# Patient Record
Sex: MALE | Race: WHITE | NOT HISPANIC OR LATINO | Employment: OTHER | URBAN - METROPOLITAN AREA
[De-identification: names, ages, dates, MRNs, and addresses within clinical notes are randomized per-mention and may not be internally consistent; named-entity substitution may affect disease eponyms.]

---

## 2017-02-02 ENCOUNTER — ALLSCRIPTS OFFICE VISIT (OUTPATIENT)
Dept: OTHER | Facility: OTHER | Age: 57
End: 2017-02-02

## 2017-02-02 DIAGNOSIS — E78.5 HYPERLIPIDEMIA: ICD-10-CM

## 2017-02-02 DIAGNOSIS — Z12.5 ENCOUNTER FOR SCREENING FOR MALIGNANT NEOPLASM OF PROSTATE: ICD-10-CM

## 2017-02-02 DIAGNOSIS — I25.10 ATHEROSCLEROTIC HEART DISEASE OF NATIVE CORONARY ARTERY WITHOUT ANGINA PECTORIS: ICD-10-CM

## 2017-02-25 ENCOUNTER — APPOINTMENT (OUTPATIENT)
Dept: LAB | Facility: CLINIC | Age: 57
End: 2017-02-25
Payer: COMMERCIAL

## 2017-02-25 DIAGNOSIS — E78.5 HYPERLIPIDEMIA: ICD-10-CM

## 2017-02-25 DIAGNOSIS — I25.10 ATHEROSCLEROTIC HEART DISEASE OF NATIVE CORONARY ARTERY WITHOUT ANGINA PECTORIS: ICD-10-CM

## 2017-02-25 DIAGNOSIS — Z12.5 ENCOUNTER FOR SCREENING FOR MALIGNANT NEOPLASM OF PROSTATE: ICD-10-CM

## 2017-02-25 LAB
ALBUMIN SERPL BCP-MCNC: 3.7 G/DL (ref 3.5–5)
ALP SERPL-CCNC: 54 U/L (ref 46–116)
ALT SERPL W P-5'-P-CCNC: 40 U/L (ref 12–78)
ANION GAP SERPL CALCULATED.3IONS-SCNC: 8 MMOL/L (ref 4–13)
AST SERPL W P-5'-P-CCNC: 19 U/L (ref 5–45)
BASOPHILS # BLD AUTO: 0.04 THOUSANDS/ΜL (ref 0–0.1)
BASOPHILS NFR BLD AUTO: 1 % (ref 0–1)
BILIRUB SERPL-MCNC: 1.44 MG/DL (ref 0.2–1)
BUN SERPL-MCNC: 19 MG/DL (ref 5–25)
CALCIUM SERPL-MCNC: 8.6 MG/DL (ref 8.3–10.1)
CHLORIDE SERPL-SCNC: 110 MMOL/L (ref 100–108)
CHOLEST SERPL-MCNC: 144 MG/DL (ref 50–200)
CO2 SERPL-SCNC: 25 MMOL/L (ref 21–32)
CREAT SERPL-MCNC: 0.92 MG/DL (ref 0.6–1.3)
EOSINOPHIL # BLD AUTO: 0.37 THOUSAND/ΜL (ref 0–0.61)
EOSINOPHIL NFR BLD AUTO: 6 % (ref 0–6)
ERYTHROCYTE [DISTWIDTH] IN BLOOD BY AUTOMATED COUNT: 13.9 % (ref 11.6–15.1)
GFR SERPL CREATININE-BSD FRML MDRD: >60 ML/MIN/1.73SQ M
GLUCOSE SERPL-MCNC: 89 MG/DL (ref 65–140)
HCT VFR BLD AUTO: 45.8 % (ref 36.5–49.3)
HDLC SERPL-MCNC: 65 MG/DL (ref 40–60)
HGB BLD-MCNC: 15.5 G/DL (ref 12–17)
LDLC SERPL CALC-MCNC: 62 MG/DL (ref 0–100)
LYMPHOCYTES # BLD AUTO: 1.72 THOUSANDS/ΜL (ref 0.6–4.47)
LYMPHOCYTES NFR BLD AUTO: 25 % (ref 14–44)
MCH RBC QN AUTO: 30.5 PG (ref 26.8–34.3)
MCHC RBC AUTO-ENTMCNC: 33.8 G/DL (ref 31.4–37.4)
MCV RBC AUTO: 90 FL (ref 82–98)
MONOCYTES # BLD AUTO: 0.69 THOUSAND/ΜL (ref 0.17–1.22)
MONOCYTES NFR BLD AUTO: 10 % (ref 4–12)
NEUTROPHILS # BLD AUTO: 3.93 THOUSANDS/ΜL (ref 1.85–7.62)
NEUTS SEG NFR BLD AUTO: 58 % (ref 43–75)
NRBC BLD AUTO-RTO: 0 /100 WBCS
PLATELET # BLD AUTO: 263 THOUSANDS/UL (ref 149–390)
PMV BLD AUTO: 10.7 FL (ref 8.9–12.7)
POTASSIUM SERPL-SCNC: 3.8 MMOL/L (ref 3.5–5.3)
PROT SERPL-MCNC: 7 G/DL (ref 6.4–8.2)
PSA SERPL-MCNC: 0.5 NG/ML (ref 0–4)
RBC # BLD AUTO: 5.09 MILLION/UL (ref 3.88–5.62)
SODIUM SERPL-SCNC: 143 MMOL/L (ref 136–145)
TRIGL SERPL-MCNC: 83 MG/DL
TSH SERPL DL<=0.05 MIU/L-ACNC: 3.6 UIU/ML (ref 0.36–3.74)
WBC # BLD AUTO: 6.76 THOUSAND/UL (ref 4.31–10.16)

## 2017-02-25 PROCEDURE — G0103 PSA SCREENING: HCPCS

## 2017-02-25 PROCEDURE — 80061 LIPID PANEL: CPT

## 2017-02-25 PROCEDURE — 84443 ASSAY THYROID STIM HORMONE: CPT

## 2017-02-25 PROCEDURE — 85025 COMPLETE CBC W/AUTO DIFF WBC: CPT

## 2017-02-25 PROCEDURE — 80053 COMPREHEN METABOLIC PANEL: CPT

## 2017-02-25 PROCEDURE — 36415 COLL VENOUS BLD VENIPUNCTURE: CPT

## 2017-07-31 ENCOUNTER — ALLSCRIPTS OFFICE VISIT (OUTPATIENT)
Dept: OTHER | Facility: OTHER | Age: 57
End: 2017-07-31

## 2017-08-14 ENCOUNTER — ALLSCRIPTS OFFICE VISIT (OUTPATIENT)
Dept: OTHER | Facility: OTHER | Age: 57
End: 2017-08-14

## 2017-08-14 ENCOUNTER — TRANSCRIBE ORDERS (OUTPATIENT)
Dept: LAB | Facility: CLINIC | Age: 57
End: 2017-08-14

## 2017-08-14 ENCOUNTER — APPOINTMENT (OUTPATIENT)
Dept: LAB | Facility: CLINIC | Age: 57
End: 2017-08-14
Payer: COMMERCIAL

## 2017-08-14 DIAGNOSIS — R53.83 OTHER FATIGUE: ICD-10-CM

## 2017-08-14 DIAGNOSIS — I10 ESSENTIAL (PRIMARY) HYPERTENSION: ICD-10-CM

## 2017-08-14 LAB
ANION GAP SERPL CALCULATED.3IONS-SCNC: 7 MMOL/L (ref 4–13)
BUN SERPL-MCNC: 11 MG/DL (ref 5–25)
CALCIUM SERPL-MCNC: 9.3 MG/DL (ref 8.3–10.1)
CHLORIDE SERPL-SCNC: 109 MMOL/L (ref 100–108)
CO2 SERPL-SCNC: 25 MMOL/L (ref 21–32)
CREAT SERPL-MCNC: 0.9 MG/DL (ref 0.6–1.3)
GFR SERPL CREATININE-BSD FRML MDRD: 96 ML/MIN/1.73SQ M
GLUCOSE SERPL-MCNC: 82 MG/DL (ref 65–140)
POTASSIUM SERPL-SCNC: 3.7 MMOL/L (ref 3.5–5.3)
SODIUM SERPL-SCNC: 141 MMOL/L (ref 136–145)

## 2017-08-14 PROCEDURE — 86618 LYME DISEASE ANTIBODY: CPT

## 2017-08-14 PROCEDURE — 80048 BASIC METABOLIC PNL TOTAL CA: CPT

## 2017-08-14 PROCEDURE — 36415 COLL VENOUS BLD VENIPUNCTURE: CPT

## 2017-08-16 LAB
B BURGDOR IGG SER IA-ACNC: 0.17
B BURGDOR IGM SER IA-ACNC: 0.31

## 2017-10-11 ENCOUNTER — HOSPITAL ENCOUNTER (EMERGENCY)
Facility: HOSPITAL | Age: 57
Discharge: HOME | End: 2017-10-11
Attending: EMERGENCY MEDICINE | Admitting: EMERGENCY MEDICINE
Payer: COMMERCIAL

## 2017-10-11 VITALS
OXYGEN SATURATION: 98 % | HEIGHT: 66 IN | BODY MASS INDEX: 32.14 KG/M2 | HEART RATE: 60 BPM | TEMPERATURE: 97.6 F | RESPIRATION RATE: 16 BRPM | WEIGHT: 200 LBS | DIASTOLIC BLOOD PRESSURE: 87 MMHG | SYSTOLIC BLOOD PRESSURE: 140 MMHG

## 2017-10-11 DIAGNOSIS — K64.5 THROMBOSED EXTERNAL HEMORRHOID: Primary | ICD-10-CM

## 2017-10-11 PROCEDURE — 99282 EMERGENCY DEPT VISIT SF MDM: CPT

## 2017-10-11 RX ORDER — NAPROXEN 500 MG/1
500 TABLET ORAL 2 TIMES DAILY WITH MEALS
Qty: 20 TABLET | Refills: 0 | Status: SHIPPED | OUTPATIENT
Start: 2017-10-11 | End: 2018-02-17

## 2017-10-11 RX ORDER — NAPROXEN 250 MG/1
500 TABLET ORAL ONCE
Status: COMPLETED | OUTPATIENT
Start: 2017-10-11 | End: 2017-10-11

## 2017-10-11 RX ORDER — ASCORBIC ACID 1000 MG
TABLET ORAL DAILY
COMMUNITY

## 2017-10-11 RX ORDER — NEBIVOLOL 10 MG/1
TABLET ORAL
COMMUNITY
Start: 2017-07-24 | End: 2018-02-17

## 2017-10-11 RX ORDER — ROSUVASTATIN CALCIUM 20 MG/1
TABLET, COATED ORAL
COMMUNITY
Start: 2014-09-18 | End: 2018-02-17

## 2017-10-11 RX ORDER — HYDROCODONE BITARTRATE AND ACETAMINOPHEN 5; 325 MG/1; MG/1
1 TABLET ORAL EVERY 6 HOURS PRN
Qty: 10 TABLET | Refills: 0 | Status: SHIPPED | OUTPATIENT
Start: 2017-10-11 | End: 2017-10-14

## 2017-10-11 RX ORDER — RANITIDINE 150 MG/1
TABLET ORAL
COMMUNITY
Start: 2014-12-09 | End: 2018-02-17

## 2017-10-11 RX ADMIN — NAPROXEN 500 MG: 250 TABLET ORAL at 16:29

## 2017-10-11 NOTE — DISCHARGE INSTRUCTIONS
Please continue Sitz baths symptom control, use the medications as prescribed return if he developed worsening or other concerning symptoms as instructed otherwise please follow up as discussed       Hemorrhoids   WHAT YOU NEED TO KNOW:   Hemorrhoids are swollen blood vessels inside your rectum (internal hemorrhoids) or on your anus (external hemorrhoids)  Sometimes a hemorrhoid may prolapse  This means it extends out of your anus  DISCHARGE INSTRUCTIONS:   Return to the emergency department if:   · You have severe pain in your rectum or around your anus  · You have severe pain in your abdomen and you are vomiting  · You have bleeding from your anus that soaks through your underwear  Contact your healthcare provider if:   · You have frequent and painful bowel movements  · Your hemorrhoid looks or feels more swollen than usual      · You do not have a bowel movement for 2 days or more  · You see or feel tissue coming through your anus  · You have questions or concerns about your condition or care  Medicines: You may  need any of the following:  · A pad, cream, or ointment  can help decrease pain, swelling, and itching  · Stool softeners  help treat or prevent constipation  · NSAIDs , such as ibuprofen, help decrease swelling, pain, and fever  NSAIDs can cause stomach bleeding or kidney problems in certain people  If you take blood thinner medicine, always ask your healthcare provider if NSAIDs are safe for you  Always read the medicine label and follow directions  · Take your medicine as directed  Contact your healthcare provider if you think your medicine is not helping or if you have side effects  Tell him or her if you are allergic to any medicine  Keep a list of the medicines, vitamins, and herbs you take  Include the amounts, and when and why you take them  Bring the list or the pill bottles to follow-up visits   Carry your medicine list with you in case of an emergency  Manage your symptoms:   · Apply ice on your anus for 15 to 20 minutes every hour or as directed  Use an ice pack, or put crushed ice in a plastic bag  Cover it with a towel before you apply it to your anus  Ice helps prevent tissue damage and decreases swelling and pain  · Take a sitz bath  Fill a bathtub with 4 to 6 inches of warm water  You may also use a sitz bath pan that fits inside a toilet bowl  Sit in the sitz bath for 15 minutes  Do this 3 times a day, and after each bowel movement  The warm water can help decrease pain and swelling  · Keep your anal area clean  Gently wash the area with warm water daily  Soap may irritate the area  After a bowel movement, wipe with moist towelettes or wet toilet paper  Dry toilet paper can irritate the area  Prevent hemorrhoids:   · Do not strain to have a bowel movement  Do not sit on the toilet too long  These actions can increase pressure on the tissues in your rectum and anus  · Drink plenty of liquids  Liquids can help prevent constipation  Ask how much liquid to drink each day and which liquids are best for you  · Eat a variety of high-fiber foods  Examples include fruits, vegetables, and whole grains  Ask your healthcare provider how much fiber you need each day  You may need to take a fiber supplement  · Exercise as directed  Exercise, such as walking, may make it easier to have a bowel movement  Ask your healthcare provider to help you create an exercise plan  · Do not have anal sex  Anal sex can weaken the skin around your rectum and anus  · Avoid heavy lifting  This can cause straining and increase your risk for another hemorrhoid  Follow up with your healthcare provider as directed:  Write down your questions so you remember to ask them during your visits     © 2017 [address] [employer] Information is for End User's use only and may not be sold, redistributed or otherwise used for commercial purposes  All illustrations and images included in CareNotes® are the copyrighted property of [employer]  [employer]  or Jaciel Santos  The above information is an  only  It is not intended as medical advice for individual conditions or treatments  Talk to your doctor, nurse or pharmacist before following any medical regimen to see if it is safe and effective for you  Thrombosed Hemorrhoid   WHAT YOU NEED TO KNOW:   A thrombosed hemorrhoid happens when blood clots become trapped inside your hemorrhoid  It is a common complication of hemorrhoids  Your hemorrhoid may suddenly look swollen or blue and feel very painful  DISCHARGE INSTRUCTIONS:   Return to the emergency department if:   · Your pain does not get better after you take medicine for pain  · You have heavy bleeding from your anus that fills 1 or more sanitary pads in 1 hour  Contact your healthcare provider if:   · You have a fever  · You have pus or a foul-smelling odor coming from your incision  · You have questions or concerns about your condition or care  Medicines: You may  need any of the following:  · Medicine  may be given to decrease pain, swelling, and itching  The medicine may come as a pad, cream, or ointment  · Acetaminophen  decreases pain and fever  It is available without a doctor's order  Ask how much to take and how often to take it  Follow directions  Acetaminophen can cause liver damage if not taken correctly  · Stool softeners  help treat or prevent constipation  · NSAIDs , such as ibuprofen, help decrease swelling, pain, and fever  NSAIDs can cause stomach bleeding or kidney problems in certain people  If you take blood thinner medicine, always ask your healthcare provider if NSAIDs are safe for you  Always read the medicine label and follow directions  · Take your medicine as directed  Contact your healthcare provider if you think your medicine is not helping or if you have side effects  Tell him or her if you are allergic to any medicine  Keep a list of the medicines, vitamins, and herbs you take  Include the amounts, and when and why you take them  Bring the list or the pill bottles to follow-up visits  Carry your medicine list with you in case of an emergency  Care for your wound as directed:  Do the following if your healthcare provider has made an incision in your hemorrhoid:  · Remove your bandage in 6 hours or as directed  · Ask your healthcare provider if you need to replace your bandage  If he tells you to, pat dry the area after your sitz bath  Put on new, clean bandages as directed  Change your bandages when they get wet or dirty  Wear a sanitary pad to absorb bleeding  Keep your anal area clean:  Gently wash the area with warm water daily  Soap may irritate the area  After a bowel movement, wipe with moist towelettes or wet toilet paper  Dry toilet paper can irritate the area  Take a sitz bath as directed:  A sitz bath can help decrease pain and swelling, and help keep the area clean  Take a sitz bath 3 times a day, and after each bowel movement  Fill a bathtub with 4 to 6 inches of warm water  You may also use a sitz bath pan that fits inside a toilet bowl  Sit in the sitz bath for 15 minutes  Apply ice on your anus for 15 to 20 minutes every hour or as directed:  Use an ice pack, or put crushed ice in a plastic bag  Cover it with a towel before you apply it to your anus  Ice helps prevent tissue damage and decreases swelling and pain  Prevent hemorrhoids:   · Do not strain to have a bowel movement  Do not sit on the toilet too long  These actions increase pressure in your rectum and anus  · Drink plenty of liquids  Liquids can help prevent constipation  Ask how much liquid to drink each day and which liquids are best for you  · Eat a variety of high-fiber foods  Examples include fruits, vegetables, and whole grains   Ask your healthcare provider how much fiber you need each day  You may need to take a fiber supplement  · Exercise as directed  Exercise, such as walking, may make it easier to have a bowel movement  Ask your healthcare provider to help you create an exercise plan  · Avoid heavy lifting  This can cause straining and increase your risk for another thrombosed hemorrhoid  Follow up with your healthcare provider as directed:  Write down your questions so you remember to ask them during your visits  © 2017 [employer] Information is for End User's use only and may not be sold, redistributed or otherwise used for commercial purposes  All illustrations and images included in CareNotes® are the copyrighted property of [employer]  or Jaciel Danielle  The above information is an  only  It is not intended as medical advice for individual conditions or treatments  Talk to your doctor, nurse or pharmacist before following any medical regimen to see if it is safe and effective for you  Sitz Bath   WHAT YOU NEED TO KNOW:   A sitz bath is when you soak in a warm or hot water tub to promote wound healing  It is often done after surgeries on the rectal or genital area  The heat from the water will clean the area, improve blood flow for healing, and decrease swelling and pain  DISCHARGE INSTRUCTIONS:   Gather your sitz bath supplies:   · A bathtub thermometer to check the water temperature    · Towels to cover your upper body during the bath and to dry off after    · A footstool to help you enter the bathtub if needed    · Bath mats to prevent slips in the tub and after you get out of the tub    · Clean clothes to wear after the bath  Fill the bathtub with water:  Fill the bathtub with water until it is at about the level of your belly button  Check the temperature of the water before you get in  For a warm water sitz bath, the water should be 94° to 98° Fahrenheit   A hot water sitz bath should be between 105° to 110° Fahrenheit  Stay in the bath for about 15 to 20 minutes  Portable sitz bath: You may be sent home with a portable sitz bath if you cannot get in and out of the bathtub  This is a small tub that will fit under your toilet seat  You will fill the tub with water as directed once it is under the toilet seat  Check the temperature of the water  You will also have a squirt bottle or water bag filled with the warm water  These are used to let the water flow out over the wound area during the sitz bath  This is also done for 15 to 20 minutes  Important tips when taking a sitz bath:   · You may have some discomfort when at first when you sit in the warm water  This should go away shortly after entering the tub  · Check the water temperature a few times during the bath  You may need to add more water to keep it at the right temperature  · Take a sitz bath when someone is close by to help you if needed  The heat from the water may cause you to feel weak or lightheaded  If this happens, call for help to get out of the tub  Do not stand up on your own in case you lose your balance  © 2017 [address] [employer] Information is for End User's use only and may not be sold, redistributed or otherwise used for commercial purposes  All illustrations and images included in CareNotes® are the copyrighted property of [employer]  or Jaciel Santos  The above information is an  only  It is not intended as medical advice for individual conditions or treatments  Talk to your doctor, nurse or pharmacist before following any medical regimen to see if it is safe and effective for you

## 2017-10-11 NOTE — ED PROVIDER NOTES
History  Chief Complaint   Patient presents with    Hemorrhoids     Pts PCP thinks he has a thrombic hemorrhoid      60-year-old male denies past medical history presenting with chief complaint of rectal pain  The patient states that he woke up this morning with a painful rectal mass the right side of his anus and constant all day and unchanged is worse when he sits is, better with rest he has not taken anything for this he denies fevers chills buttock pain discharge purulent discharge bleeding constitutional symptoms or other associated symptoms also denies history of similar symptoms he has had colonoscopies previously, family doctor was sent to the emergency department for evaluation of possible thrombosed hemorrhoid  Complete review systems otherwise negative as noted  Prior to Admission Medications   Prescriptions Last Dose Informant Patient Reported? Taking? Coenzyme Q10 (CO Q 10) 10 MG CAPS   Yes No   Sig: Take by mouth   aspirin 81 MG tablet   Yes No   Sig: Take by mouth   nebivolol (BYSTOLIC) 10 mg tablet   Yes Yes   Sig: Take by mouth   ranitidine (ZANTAC) 150 mg tablet   Yes Yes   Sig: Take by mouth   rosuvastatin (CRESTOR) 20 MG tablet   Yes Yes   Sig: Take by mouth      Facility-Administered Medications: None       Past Medical History:   Diagnosis Date    Hypertension        History reviewed  No pertinent surgical history  History reviewed  No pertinent family history  I have reviewed and agree with the history as documented  Social History   Substance Use Topics    Smoking status: Never Smoker    Smokeless tobacco: Never Used    Alcohol use No        Review of Systems   Constitutional: Negative for chills and fever  HENT: Negative for rhinorrhea and sore throat  Respiratory: Negative for cough and shortness of breath  Cardiovascular: Negative for chest pain and palpitations  Gastrointestinal: Positive for rectal pain   Negative for abdominal pain, anal bleeding, blood in stool, constipation, diarrhea, nausea and vomiting  Genitourinary: Negative for dysuria, frequency and urgency  Musculoskeletal: Negative for arthralgias, back pain and myalgias  Skin: Negative for color change and rash  Neurological: Negative for dizziness, weakness and numbness  Hematological: Negative for adenopathy  Does not bruise/bleed easily  Psychiatric/Behavioral: Negative for agitation and behavioral problems  All other systems reviewed and are negative  Physical Exam  ED Triage Vitals [10/11/17 1434]   Temperature Pulse Respirations Blood Pressure SpO2   97 6 °F (36 4 °C) 64 16 143/85 98 %      Temp Source Heart Rate Source Patient Position - Orthostatic VS BP Location FiO2 (%)   Oral Monitor Sitting Right arm --      Pain Score       4           Physical Exam   Constitutional: He is oriented to person, place, and time  He appears well-developed and well-nourished  No distress  Well-appearing in no acute distress   HENT:   Head: Normocephalic and atraumatic  Eyes: EOM are normal  Pupils are equal, round, and reactive to light  Neck: Normal range of motion  Neck supple  No tracheal deviation present  Cardiovascular: Normal rate, regular rhythm and normal heart sounds  Exam reveals no gallop and no friction rub  No murmur heard  Pulmonary/Chest: Effort normal and breath sounds normal  He has no wheezes  He has no rales  Abdominal: Soft  Bowel sounds are normal  He exhibits no distension  There is no tenderness  There is no rebound and no guarding     Patient clinically has a approximately 1-1 5 cm firm likely external thrombosed hemorrhoid at approximately 3 o'clock this is tender there is some very minimal fluctuance and does not appear completely thrombosed, there is no erythema there is no purulent drainage from the rectum, there is no surrounding induration erythema cellulitis perineal or buttock pain, clinically this is most consistent with early thrombosed hemorrhoid   Musculoskeletal: Normal range of motion  He exhibits no edema or tenderness  Neurological: He is alert and oriented to person, place, and time  No cranial nerve deficit  He exhibits normal muscle tone  Coordination normal    Skin: Skin is warm and dry  No rash noted  Psychiatric: He has a normal mood and affect  His behavior is normal    Nursing note and vitals reviewed  ED Medications  Medications   naproxen (NAPROSYN) tablet 500 mg (500 mg Oral Given 10/11/17 1629)       Diagnostic Studies  Labs Reviewed - No data to display    No orders to display       Procedures  Procedures      Phone Contacts  ED Phone Contact    ED Course  ED Course as of Oct 11 2219   Wed Oct 11, 2017   1625 Patient seen evaluated discharge after discussing at length risks benefits and alternatives to excising large thrombosed hemorrhoid, will treat conservatively, colorectal follow-up/GI follow-up as needed, close return instructions patient agreeable plan, patient of the emergency department self                                MDM  Number of Diagnoses or Management Options  Thrombosed external hemorrhoid:   Diagnosis management comments: 44-year-old male denies past medical history woke up today with painful rectal mass clinically has early thrombosed hemorrhoid though does not completely appear thrombosed, large, no other complaints, on exam again this is consistent with early thrombosed hemorrhoid this is not consistent with perirectal abscess, mass, etc, after lengthy discussion regarding risks benefits alternatives to excise large thrombosed hemorrhoid that may not completely be thrombosed, will offer symptom control, conservative treatment, follow up with Colorectal as needed close return follow-up instructions, patient and family agreeable plan    CritCare Time    Disposition  Final diagnoses:    Thrombosed external hemorrhoid     ED Disposition     ED Disposition Condition Comment    Discharge  Alonso Ramirez discharge to home/self care  Condition at discharge: Good        Follow-up Information     Follow up With Specialties Details Why Contact Info Additional Information    [facility]  If symptoms worsen [address] [facility] [place] [address] [phone] [place] [facility], [address] [place] [address], [id number] [facility], MD Colon and Rectal Surgery  As needed [address]  [phone]           Discharge Medication List as of 10/11/2017  4:29 PM      START taking these medications    Details   HYDROcodone-acetaminophen (NORCO) 5-325 mg per tablet Take 1 tablet by mouth every 6 (six) hours as needed for pain for up to 3 days Max Daily Amount: 4 tablets, Starting Wed 10/11/2017, Until Sat 10/14/2017, Print      hydrocortisone (ANUSOL-HC, PROCTOSOL HC,) 2 5 % rectal cream Insert into the rectum 2 (two) times a day, Starting Wed 10/11/2017, Print      naproxen (NAPROSYN) 500 mg tablet Take 1 tablet by mouth 2 (two) times a day with meals for 10 days, Starting Wed 10/11/2017, Until Sat 10/21/2017, Print         CONTINUE these medications which have NOT CHANGED    Details   nebivolol (BYSTOLIC) 10 mg tablet Take by mouth, Starting Mon 7/24/2017, Historical Med      ranitidine (ZANTAC) 150 mg tablet Take by mouth, Starting Tue 12/9/2014, Historical Med      rosuvastatin (CRESTOR) 20 MG tablet Take by mouth, Starting Thu 9/18/2014, Historical Med      aspirin 81 MG tablet Take by mouth, Historical Med      Coenzyme Q10 (CO Q 10) 10 MG CAPS Take by mouth, Historical Med           No discharge procedures on file      ED Provider  Electronically Signed by       Eric Correia DO  10/11/17 2851

## 2018-01-12 ENCOUNTER — ALLSCRIPTS OFFICE VISIT (OUTPATIENT)
Dept: OTHER | Facility: OTHER | Age: 58
End: 2018-01-12

## 2018-01-12 DIAGNOSIS — R07.89 OTHER CHEST PAIN: ICD-10-CM

## 2018-01-13 VITALS
HEIGHT: 67 IN | WEIGHT: 201.5 LBS | DIASTOLIC BLOOD PRESSURE: 82 MMHG | HEART RATE: 66 BPM | SYSTOLIC BLOOD PRESSURE: 128 MMHG | OXYGEN SATURATION: 96 % | BODY MASS INDEX: 31.63 KG/M2

## 2018-01-14 VITALS
DIASTOLIC BLOOD PRESSURE: 82 MMHG | HEART RATE: 68 BPM | BODY MASS INDEX: 33.27 KG/M2 | WEIGHT: 212 LBS | SYSTOLIC BLOOD PRESSURE: 118 MMHG | HEIGHT: 67 IN

## 2018-01-14 VITALS
DIASTOLIC BLOOD PRESSURE: 82 MMHG | HEART RATE: 64 BPM | SYSTOLIC BLOOD PRESSURE: 110 MMHG | HEIGHT: 67 IN | BODY MASS INDEX: 31.46 KG/M2 | WEIGHT: 200.44 LBS

## 2018-01-16 NOTE — PROGRESS NOTES
Assessment   Assessed    1  Chest discomfort (786 59) (R07 89)   2  Coronary arteriosclerosis (414 00) (I25 10)   3  Hypertension (401 9) (I10)   4  Hyperlipidemia (272 4) (E78 5)    Plan   Chest discomfort    · Nitroglycerin 0 4 MG Sublingual Tablet Sublingual; DISSOLVE 1 TABLET UNDER    THE TONGUE AS NEEDED FOR CHEST PAIN   Rx By: Pedro Alejandre; Dispense: 30 Days ; #:30 Tablet Sublingual; Refill: 5;For: Chest discomfort; TANVIR = N; Sent To: [facility]/PHARMACY #1975  · * NM MYOCARDIAL PERFUSION SPECT (STRESS AND/OR REST); Status:Need    Information - Financial Authorization,Retrospective Authorization; Requested    FBW:[id number]; Perform:[facility]; SPF:[id number]; Last Updated By:Misha Love; 1/12/2018 3:25:14 PM;Ordered; For:Chest discomfort; Ordered By:Andres Buitrago;   · ECHO COMPLETE WITH CONTRAST IF INDICATED; Status:Need Information - Financial    Authorization; Requested RICH:[id number]; Perform:[facility]; WMV:[id number];FUYBVRS; For:Chest discomfort; Ordered By:Misha Love;   · Follow-up visit in 3 months Evaluation and Treatment  Follow-up  Status: Hold For -    Scheduling  Requested for: [id number]   Ordered; For: Chest discomfort; Ordered By: Pedro Alejandre Performed:  Due: [id number]    Discussion/Summary   Cardiology Discussion Summary Free Text Note Form [facility]:    1  Chest pain: atypical  However in light of risk factors and strong family history, we will do an ECHO and stress test to r/o coronary ischemia  If chest pain persists or tests are abnormal, may proceed with cardiac catheterization  Will also start SL nitro as needed  Patient has known 50% stenosis of RCA  CAD: Continue ASA, rosuvastatin, bystolic  In light of chest discomfort, see above  Patient has known 50% stenosis of RCA  HTN: Controlled   HLD: On rosuvastatin   in 3 months  Goals and Barriers:  The patient has the current Goals: Medical management, ECHO, stress test  The patent has the current Barriers: None       Chief Complaint   Chief Complaint Free Text Note Form: f/u visit        Chief Complaint Chronic Condition [facility]: Patient is here today for follow up of chronic conditions described in HPI  History of Present Illness   Cardiology HPI Free Text Note Form [facility]: Patient with history of CAD, HTN, HLD presenting for follow up visit  Patient states that he sometimes gets a burning sensation in his chest, mild 2/10, L sided radiating to the center of his chest  This feels different from his GERD  He is unsure if this is exertional but he does notice it with exertion sometimes  He says the sensation only lasts a few minutes and then resolves on its own  Patient has an extensive family history of myocardial infarction and CAD  Patient has had a stress ECHO in 2016 which was equivocal for exercise induced ischemic ST segment depressions but negative for exercise induced ischemia based on normal augmented LV contractile  No recent ECHO  Patient denies SOB, lightheadedness, palpitations, leg swelling  Review of Systems   Cardiology Male ROS:         Cardiac: chest pain, but-- as noted in HPI  Skin: No complaints of nonhealing sores or skin rash  Genitourinary: No complaints of recurrent urinary tract infections, frequent urination at night, difficult urination, blood in urine, kidney stones, loss of bladder control, no kidney or prostate problems, no erectile dysfunction  Psychological: No complaints of feeling depressed, anxiety, panic attacks, or difficulty concentrating  General: No complaints of trouble sleeping, lack of energy, fatigue, appetite changes, weight changes, fever, frequent infections, or night sweats  Respiratory: No complaints of shortness of breath, cough with sputum, or wheezing  HEENT: No complaints of serious problems, hearing problems, nose problems, throat problems, or snoring        Gastrointestinal: No complaints of liver problems, nausea, vomiting, heartburn, constipation, bloody stools, diarrhea, problems swallowing, adbominal pain, or rectal bleeding  Hematologic: No complaints of bleeding disorders, anemia, blood clots, or excessive brusing  Neurological: No complaints of numbness, tingling, dizziness, weakness, seizures, headaches, syncope or fainting, AM fatigue, daytime sleepiness, no witnessed apnea episodes  Musculoskeletal: No complaints of arthritis, back pain, or painfull swelling  ROS Reviewed:    ROS reviewed  Active Problems   Problems    1  Coronary arteriosclerosis (414 00) (I25 10)   2  Esophageal reflux (530 81) (K21 9)   3  Fatigue (780 79) (R53 83)   4  Heart burn (787 1) (R12)   5  Hyperlipidemia (272 4) (E78 5)   6  Hypertension (401 9) (I10)   7  Obesity (278 00) (E66 9)   8  Prostate cancer screening (V76 44) (Z12 5)    Past Medical History   Problems    1  Denied: History of mental disorder   2  Denied: History of substance abuse   3  History of Intermittent diarrhea (787 91) (R19 7)   4  History of Tick bite (919 4,E906 4) [facility])  Active Problems And Past Medical History Reviewed: The active problems and past medical history were reviewed and updated today  Surgical History   Problems    1  History of Complete Colonoscopy  Surgical History Reviewed: The surgical history was reviewed and updated today  Family History   Mother    1  Denied: Family history of mental disorder   2  Denied: Family history of substance abuse  Father    3  Family history of Coronary Artery Disease (V17 49)   4  Denied: Family history of mental disorder   5  Denied: Family history of substance abuse   6  Family history of Myocardial Infarction Arrhythmias  Maternal Aunt    7  FH: GI cancer (V16 0) (Z80 0)  Family History Reviewed: The family history was reviewed and updated today         Social History   Problems    · Alcohol Use (History)   · Denied: History of Drug use   · Never a smoker  Social History Reviewed: The social history was reviewed and updated today  Current Meds    1  Aspirin 81 MG TABS; Take 1 tablet daily Recorded   2  Bystolic 10 MG Oral Tablet; Take 1 tablet daily; Therapy: [id number] to (Last Rx:[id number])  Requested for: 93Ehp4604 Ordered   3  Co Q 10 CAPS Recorded   4  Mens Multivitamin Plus TABS Recorded   5  RaNITidine HCl - 150 MG Oral Tablet; TAKE 1 TABLET DAILY; Therapy: 05IPE8711 to (Evaluate:08Jan2015); Last Rx:50Mgf0046 Ordered   6  Rosuvastatin Calcium 20 MG Oral Tablet; take 1 tablet by mouth every day; Therapy: 26SDE7391 to (Last 781 1704)  Requested for: 27Nov2017 Ordered  Medication List Reviewed: The medication list was reviewed and updated today  Allergies   Medication    1  No Known Drug Allergies    Vitals   Vital Signs    Recorded: 12Jan2018 02:40PM   Heart Rate 68   Systolic 578   Diastolic 78   Height 5 ft 6 5 in   Weight 208 lb 6 0 oz   BMI Calculated 33 13   BSA Calculated 2 05   O2 Saturation 96     Physical Exam        Constitutional      General appearance: No acute distress, well appearing and well nourished  Eyes      Conjunctiva and Sclera examination: Conjunctiva pink, sclera anicteric  Ears, Nose, Mouth, and Throat - Oropharynx: Clear, nares are clear, mucous membranes are moist       Neck      Neck and thyroid: Normal, supple, trachea midline, no thyromegaly  Pulmonary      Respiratory effort: No increased work of breathing or signs of respiratory distress  Auscultation of lungs: Clear to auscultation, no rales, no rhonchi, no wheezing, good air movement  Cardiovascular      Auscultation of heart: Normal rate and rhythm, normal S1 and S2, no murmurs  Carotid pulses: Normal, 2+ bilaterally  Peripheral vascular exam: Normal pulses throughout, no tenderness, erythema or swelling  Pedal pulses: Normal, 2+ bilaterally         Examination of extremities for edema and/or varicosities: Normal  Abdomen      Abdomen: Non-tender and no distention  Liver and spleen: No hepatomegaly or splenomegaly  Musculoskeletal Gait and station: Normal gait  -- Digits and nails: Normal without clubbing or cyanosis  -- Inspection/palpation of joints, bones, and muscles: Normal, ROM normal        Skin - Skin and subcutaneous tissue: Normal without rashes or lesions  Skin is warm and well perfused, normal turgor  Neurologic - Cranial nerves: II - XII intact  -- Speech: Normal        Psychiatric - Orientation to person, place, and time: Normal -- Mood and affect: Normal       Future Appointments      Date/Time Provider Specialty Site   02/19/2018 10:45 AM Bay Irving, DO Internal Medicine [facility]     Signatures    Electronically signed by : Darlene Bartholomew [facility]; Jan 12 2018  3:34PM EST                       (Author)     Electronically signed by : LETICIA Cavanaugh ; Nelson 15 2018 11:01PM EST                       (Author)

## 2018-01-22 VITALS
HEIGHT: 67 IN | BODY MASS INDEX: 32.71 KG/M2 | OXYGEN SATURATION: 96 % | HEART RATE: 68 BPM | SYSTOLIC BLOOD PRESSURE: 112 MMHG | WEIGHT: 208.38 LBS | DIASTOLIC BLOOD PRESSURE: 78 MMHG

## 2018-02-15 ENCOUNTER — HOSPITAL ENCOUNTER (OUTPATIENT)
Dept: NON INVASIVE DIAGNOSTICS | Facility: CLINIC | Age: 58
Discharge: HOME | End: 2018-02-15
Payer: COMMERCIAL

## 2018-02-15 DIAGNOSIS — R07.89 OTHER CHEST PAIN: ICD-10-CM

## 2018-02-15 PROCEDURE — 93017 CV STRESS TEST TRACING ONLY: CPT

## 2018-02-15 PROCEDURE — 93016 CV STRESS TEST SUPVJ ONLY: CPT | Performed by: INTERNAL MEDICINE

## 2018-02-15 PROCEDURE — A9502 TC99M TETROFOSMIN: HCPCS

## 2018-02-15 PROCEDURE — 78452 HT MUSCLE IMAGE SPECT MULT: CPT | Performed by: INTERNAL MEDICINE

## 2018-02-15 PROCEDURE — 93306 TTE W/DOPPLER COMPLETE: CPT | Performed by: INTERNAL MEDICINE

## 2018-02-15 PROCEDURE — 78452 HT MUSCLE IMAGE SPECT MULT: CPT

## 2018-02-15 PROCEDURE — 93306 TTE W/DOPPLER COMPLETE: CPT

## 2018-02-15 PROCEDURE — 93018 CV STRESS TEST I&R ONLY: CPT | Performed by: INTERNAL MEDICINE

## 2018-02-15 RX ORDER — AMINOPHYLLINE DIHYDRATE 25 MG/ML
50 INJECTION, SOLUTION INTRAVENOUS ONCE
Status: COMPLETED | OUTPATIENT
Start: 2018-02-15 | End: 2018-02-15

## 2018-02-15 RX ADMIN — AMINOPHYLLINE 50 MG: 25 INJECTION, SOLUTION INTRAVENOUS at 13:57

## 2018-02-15 RX ADMIN — REGADENOSON 0.4 MG: 0.08 INJECTION, SOLUTION INTRAVENOUS at 13:54

## 2018-02-16 ENCOUNTER — TELEPHONE (OUTPATIENT)
Dept: CARDIOLOGY CLINIC | Facility: CLINIC | Age: 58
End: 2018-02-16

## 2018-02-16 LAB
ARRHY DURING EX: NORMAL
CHEST PAIN STATEMENT: NORMAL
MAX DIASTOLIC BP: 76 MMHG
MAX HEART RATE: 129 BPM
MAX PREDICTED HEART RATE: 164 BPM
MAX. SYSTOLIC BP: 138 MMHG
PROTOCOL NAME: NORMAL
REASON FOR TERMINATION: NORMAL
TARGET HR FORMULA: NORMAL
TEST INDICATION: NORMAL
TIME IN EXERCISE PHASE: NORMAL

## 2018-02-19 ENCOUNTER — TELEPHONE (OUTPATIENT)
Dept: CARDIOLOGY CLINIC | Facility: CLINIC | Age: 58
End: 2018-02-19

## 2018-02-21 ENCOUNTER — TELEPHONE (OUTPATIENT)
Dept: INTERNAL MEDICINE CLINIC | Facility: CLINIC | Age: 58
End: 2018-02-21

## 2018-02-21 ENCOUNTER — OFFICE VISIT (OUTPATIENT)
Dept: INTERNAL MEDICINE CLINIC | Facility: CLINIC | Age: 58
End: 2018-02-21
Payer: COMMERCIAL

## 2018-02-21 VITALS
BODY MASS INDEX: 33.11 KG/M2 | OXYGEN SATURATION: 95 % | TEMPERATURE: 97.4 F | DIASTOLIC BLOOD PRESSURE: 82 MMHG | RESPIRATION RATE: 18 BRPM | WEIGHT: 206 LBS | SYSTOLIC BLOOD PRESSURE: 124 MMHG | HEART RATE: 76 BPM | HEIGHT: 66 IN

## 2018-02-21 DIAGNOSIS — Z71.3 DIETARY COUNSELING AND SURVEILLANCE: ICD-10-CM

## 2018-02-21 DIAGNOSIS — E66.9 OBESITY (BMI 30.0-34.9): Chronic | ICD-10-CM

## 2018-02-21 DIAGNOSIS — I25.10 CORONARY ARTERIOSCLEROSIS: Primary | Chronic | ICD-10-CM

## 2018-02-21 DIAGNOSIS — I10 ESSENTIAL HYPERTENSION: Chronic | ICD-10-CM

## 2018-02-21 DIAGNOSIS — E78.5 HYPERLIPIDEMIA LDL GOAL <100: Chronic | ICD-10-CM

## 2018-02-21 DIAGNOSIS — Z12.5 SCREENING FOR PROSTATE CANCER: ICD-10-CM

## 2018-02-21 PROCEDURE — 99214 OFFICE O/P EST MOD 30 MIN: CPT | Performed by: INTERNAL MEDICINE

## 2018-02-21 NOTE — PATIENT INSTRUCTIONS
Obesity   AMBULATORY CARE:   Obesity  is when your body mass index (BMI) is greater than 30  Your healthcare provider will use your height and weight to measure your BMI  The risks of obesity include  many health problems, such as injuries or physical disability  You may need tests to check for the following:  · Diabetes     · High blood pressure or high cholesterol     · Heart disease     · Gallbladder or liver disease     · Cancer of the colon, breast, prostate, liver, or kidney     · Sleep apnea     · Arthritis or gout  Seek care immediately if:   · You have a severe headache, confusion, or difficulty speaking  · You have weakness on one side of your body  · You have chest pain, sweating, or shortness of breath  Contact your healthcare provider if:   · You have symptoms of gallbladder or liver disease, such as pain in your upper abdomen  · You have knee or hip pain and discomfort while walking  · You have symptoms of diabetes, such as intense hunger and thirst, and frequent urination  · You have symptoms of sleep apnea, such as snoring or daytime sleepiness  · You have questions or concerns about your condition or care  Treatment for obesity  focuses on helping you lose weight to improve your health  Even a small decrease in BMI can reduce the risk for many health problems  Your healthcare provider will help you set a weight-loss goal   · Lifestyle changes  are the first step in treating obesity  These include making healthy food choices and getting regular physical activity  Your healthcare provider may suggest a weight-loss program that involves coaching, education, and therapy  · Medicine  may help you lose weight when it is used with a healthy diet and physical activity  · Surgery  can help you lose weight if you are very obese and have other health problems  There are several types of weight-loss surgery  Ask your healthcare provider for more information    Be successful losing weight:   · Set small, realistic goals  An example of a small goal is to walk for 20 minutes 5 days a week  Anther goal is to lose 5% of your body weight  · Tell friends, family members, and coworkers about your goals  and ask for their support  Ask a friend to lose weight with you, or join a weight-loss support group  · Identify foods or triggers that may cause you to overeat , and find ways to avoid them  Remove tempting high-calorie foods from your home and workplace  Place a bowl of fresh fruit on your kitchen counter  If stress causes you to eat, then find other ways to cope with stress  · Keep a diary to track what you eat and drink  Also write down how many minutes of physical activity you do each day  Weigh yourself once a week and record it in your diary  Eating changes: You will need to eat 500 to 1,000 fewer calories each day than you currently eat to lose 1 to 2 pounds a week  The following changes will help you cut calories:  · Eat smaller portions  Use small plates, no larger than 9 inches in diameter  Fill your plate half full of fruits and vegetables  Measure your food using measuring cups until you know what a serving size looks like  · Eat 3 meals and 1 or 2 snacks each day  Plan your meals in advance  Terryl Mcburney and eat at home most of the time  Eat slowly  · Eat fruits and vegetables at every meal   They are low in calories and high in fiber, which makes you feel full  Do not add butter, margarine, or cream sauce to vegetables  Use herbs to season steamed vegetables  · Eat less fat and fewer fried foods  Eat more baked or grilled chicken and fish  These protein sources are lower in calories and fat than red meat  Limit fast food  Dress your salads with olive oil and vinegar instead of bottled dressing  · Limit the amount of sugar you eat  Do not drink sugary beverages  Limit alcohol  Activity changes:  Physical activity is good for your body in many ways   It helps you burn calories and build strong muscles  It decreases stress and depression, and improves your mood  It can also help you sleep better  Talk to your healthcare provider before you begin an exercise program   · Exercise for at least 30 minutes 5 days a week  Start slowly  Set aside time each day for physical activity that you enjoy and that is convenient for you  It is best to do both weight training and an activity that increases your heart rate, such as walking, bicycling, or swimming  · Find ways to be more active  Do yard work and housecleaning  Walk up the stairs instead of using elevators  Spend your leisure time going to events that require walking, such as outdoor festivals or fairs  This extra physical activity can help you lose weight and keep it off  Follow up with your healthcare provider as directed: You may need to meet with a dietitian  Write down your questions so you remember to ask them during your visits  © 2017 [address] [employer] Information is for End User's use only and may not be sold, redistributed or otherwise used for commercial purposes  All illustrations and images included in CareNotes® are the copyrighted property of [employer] , [employer]  or Jaciel Santos  The above information is an  only  It is not intended as medical advice for individual conditions or treatments  Talk to your doctor, nurse or pharmacist before following any medical regimen to see if it is safe and effective for you  DASH Eating Plan   AMBULATORY CARE:   The DASH (Dietary Approaches to Stop Hypertension) Eating Plan  is designed to help prevent or lower high blood pressure  It can also help to lower LDL (bad) cholesterol and decrease your risk for heart disease  The plan is low in sodium, sugar, unhealthy fats, and total fat  It is high in potassium, calcium, magnesium, and fiber  These nutrients are added when you eat more fruits, vegetables, and whole grains     Your sodium limit each day: Your dietitian will tell you how much sodium is safe for you to have each day  People with high blood pressure should have no more than 1,500 to 2,300 mg of sodium in a day  A teaspoon (tsp) of salt has 2,300 mg of sodium  This may seem like a difficult goal, but small changes to the foods you eat can make a big difference  Your healthcare provider or dietitian can help you create a meal plan that follows your sodium limit  How to limit sodium:   · Read food labels  Food labels can help you choose foods that are low in sodium  The amount of sodium is listed in milligrams (mg)  The % Daily Value (DV) column tells you how much of your daily needs are met by 1 serving of the food for each nutrient listed  Choose foods that have less than 5% of the DV of sodium  These foods are considered low in sodium  Foods that have 20% or more of the DV of sodium are considered high in sodium  Avoid foods that have more than 300 mg of sodium in each serving  Choose foods that say low-sodium, reduced-sodium, or no salt added on the food label  · Avoid salt  Do not salt food at the table, and add very little salt to foods during cooking  Use herbs and spices, such as onions, garlic, and salt-free seasonings to add flavor to foods  Try lemon or lime juice or vinegar to give foods a tart flavor  Use hot peppers or a small amount of hot pepper sauce to add a spicy flavor to foods  · Ask about salt substitutes  Ask your healthcare provider if you may use salt substitutes  Some salt substitutes have ingredients that can be harmful if you have certain health conditions  · Choose foods carefully at restaurants  Meals from restaurants, especially fast food restaurants, are often high in sodium  Some restaurants have nutrition information that tells you the amount of sodium in their foods  Ask to have your food prepared with less, or no salt    What you need to know about fats:   · Include healthy fats   Examples are unsaturated fats and omega-3 fatty acids  Unsaturated fats are found in soybean, canola, olive, or sunflower oil, and liquid and soft tub margarines  Omega-3 fatty acids are found in fatty fish, such as salmon, tuna, mackerel, and sardines  It is also found in flaxseed oil and ground flaxseed  · Avoid unhealthy fats  Do not eat unhealthy fats, such as saturated fats and trans fats  Saturated fats are found in foods that contain fat from animals  Examples are fatty meats, whole milk, butter, cream, and other dairy foods  It is also found in shortening, stick margarine, palm oil, and coconut oil  Trans fats are found in fried foods, crackers, chips, and baked goods made with margarine or shortening  Foods to include: With the DASH eating plan, you need to eat a certain number of servings from each food group  This will help you get enough of certain nutrients and limit others  The amount of servings you should eat depends on how many calories you need  Your dietitian can tell you how many calories you need  The number of servings listed next to the food groups below are for people who need about 2,000 calories each day    · Grains:  6 to 8 servings (3 of these servings should be whole-grain foods)    ¨ 1 slice of whole-grain bread     ¨ 1 ounce of dry cereal    ¨ ½ cup of cooked cereal, pasta, or brown rice    · Vegetables and fruits:  4 to 5 servings of fruits and 4 to 5 servings of vegetables    ¨ 1 medium fruit    ¨ ½ cup of frozen, canned (no added salt), or chopped fresh vegetables     ¨ ½ cup of fresh, frozen, dried, or canned fruit (canned in light syrup or fruit juice)    ¨ ½ cup of vegetable or fruit juice    · Dairy:  2 to 3 servings    ¨ 1 cup of nonfat (skim) or 1% milk    ¨ 1½ ounces of fat-free or low-fat cheese    ¨ 6 ounces of nonfat or low-fat yogurt    · Lean meat, poultry, and fish:  6 ounces or less    [employer] (chicken, turkey) with no skin    ¨ Fish (especially fatty fish, such as salmon, fresh tuna, or mackerel)    ¨ Lean beef and pork (loin, round, extra lean hamburger)    ¨ Egg whites and egg substitutes    · Nuts, seeds, and legumes:  4 to 5 servings each week    ¨ ½ cup of cooked beans and peas    ¨ 1½ ounces of unsalted nuts    ¨ 2 tablespoons of peanut butter or seeds    · Sweets and added sugars:  5 or less each week    ¨ 1 tablespoon of sugar, jelly, or jam    ¨ ½ cup of sorbet or gelatin    ¨ 1 cup of lemonade    · Fats:  2 to 3 servings each week    ¨ 1 teaspoon of soft margarine or vegetable oil    ¨ 1 tablespoon of mayonnaise    ¨ 2 tablespoons of salad dressing  Foods to avoid:   · Grains:      [employer], such as doughnuts, pastries, cookies, and biscuits (high in fat and sugar)    ¨ Mixes for cornbread and biscuits, packaged foods, such as bread stuffing, rice and pasta mixes, macaroni and cheese, and instant cereals (high in sodium)    · Fruits and vegetables:      ¨ Regular, canned vegetables (high in sodium)    ¨ Sauerkraut, pickled vegetables, and other foods prepared in brine (high in sodium)    ¨ Fried vegetables or vegetables in butter or high-fat sauces    ¨ Fruit in cream or butter sauce (high in fat)    · Dairy:      ¨ Whole milk, 2% milk, and cream (high in fat)    ¨ Regular cheese and processed cheese (high in fat and sodium)    · Meats and protein foods:      ¨ Smoked or cured meat, such as corned beef, benavides, ham, hot dogs, and sausage (high in fat and sodium)    ¨ Canned beans and canned meats or spreads, such as potted meats, sardines, anchovies, and imitation seafood (high in sodium)    ¨ Deli or lunch meats, such as bologna, ham, turkey, and roast beef (high in sodium)    ¨ High-fat meat (T-bone steak, regular hamburger, and ribs)    ¨ Whole eggs and egg yolks (high in fat)    · Other:      ¨ Seasonings made with salt, such as garlic salt, celery salt, onion salt, seasoned salt, meat tenderizers, and monosodium glutamate (MSG)    ¨ Miso soup and canned or dried soup mixes (high in sodium)    ¨ Regular soy sauce, barbecue sauce, teriyaki sauce, steak sauce, Worcestershire sauce, and most flavored vinegars (high in sodium)    ¨ Regular condiments, such as mustard, ketchup, and salad dressings (high in sodium)    ¨ Gravy and sauces, such as [employer] or cheese sauces (high in sodium and fat)    ¨ Drinks high in sugar, such as soda or fruit drinks    [employer] foods, such as salted chips, popcorn, pretzels, pork rinds, salted crackers, and salted nuts    ¨ Frozen foods, such as dinners, entrees, vegetables with sauces, and breaded meats (high in sodium)  Other guidelines to follow:   · Maintain a healthy weight  Your risk for heart disease is higher if you are overweight  Your healthcare provider may suggest that you lose weight if you are overweight  You can lose weight by eating fewer calories and foods that have added sugars and fat  The DASH meal plan can help you do this  Decrease calories by eating smaller portions at each meal and fewer snacks  Ask your healthcare provider for more information about how to lose weight  · Exercise regularly  Regular exercise can help you reach or maintain a healthy weight  Regular exercise can also help decrease your blood pressure and improve your cholesterol levels  Get 30 minutes or more of moderate exercise each day of the week  To lose weight, get at least 60 minutes of exercise  Talk to your healthcare provider about the best exercise program for you  · Limit alcohol  Women should limit alcohol to 1 drink a day  Men should limit alcohol to 2 drinks a day  A drink of alcohol is 12 ounces of beer, 5 ounces of wine, or 1½ ounces of liquor  © 2017 [address] [facility] Information is for End User's use only and may not be sold, redistributed or otherwise used for commercial purposes   All illustrations and images included in CareNotes® are the copyrighted property of A D A M , Inc  or [employer]  The above information is an  only  It is not intended as medical advice for individual conditions or treatments  Talk to your doctor, nurse or pharmacist before following any medical regimen to see if it is safe and effective for you

## 2018-02-21 NOTE — PROGRESS NOTES
INTERNAL MEDICINE FOLLOW-UP OFFICE VISIT  [facility] - [facility]    NAME: Fadia Ya  AGE: 65 y o  SEX: male  : 1960       DATE: 2018    Assessment and Plan     1  Coronary arteriosclerosis    Known 50% stenosis of RCA  Patient has recent atypical chest discomfort  Stress test and ECHO were negative  There was grade 1 DD on ECHO  Small, fixed defect on nuclear stress test  Continue aspirin, crestor, bystolic, and nitro prn  From the description and knowing his history, I would think his intermittent chest discomfort is GI related  He takes ranitidine only as needed  Previous EGD in  showed chronic esophagitis  Discussed dietary changes he should make  If chest discomfort were to persist may still have to consider cardiac cath due to his risk factors, but if that was negative, should get GI eval with repeat EGD      - Lipid panel; Future  - Comprehensive metabolic panel; Future  - CBC; Future    2  Essential hypertension    BP well controlled  Continue bystolic  3  Hyperlipidemia LDL goal <100    Repeat lipid panel  Last LDL was below 100  Continue crestor  Low cholesterol diet  4  Obesity (BMI 30 0-34 9)    Obesity is unchanged  Discussed the patient's BMI  The BMI is above average; BMI management plan is completed  General weight loss/lifestyle modification strategies discussed (elicit support from others; identify saboteurs; non-food rewards, etc)  Diet interventions: low calorie, low cholesterol, low carb  BMI goal <25    5  Screening for prostate cancer    Check PSA  Due on 18  Last PSA normal on 17     - PSA;  Future    - Counseling Documentation: patient was counseled regarding: diagnostic results, instructions for management, risk factor reductions, prognosis, patient and family education, impressions, risks and benefits of treatment options and importance of compliance with treatment  - Barriers to treatment: none  - Medication Side Effects: Adverse side effects of medications were reviewed with the patient/guardian today  - Self Referrals: No     Return to office in: 6 months or earlier if needed    Chief Complaint     Chief Complaint   Patient presents with    Follow-up     History of Present Illness     Patient presents for routine follow-up  Had been experiencing atypical chest discomfort and saw Dr Malika Funk who ordered nuclear stress test and echo  Nuclear stress showed small, fixed defect but no acute ischemia  ECHO showed grade 1 DD and normal EF  He describes the pain as a burning discomfort  Previous EGD in 2014 showed chronic esophagitis  He takes ranitidine only if he is going to eat a fatty, greasy meal when he goes out to eat  He denies any shortness of breath, palpitations, burping, bloating, poor taste in mouth, abdominal pain, diarrhea, nausea, vomiting  Labs from 1 year ago were reviewed  States he got a flu shot at [facility] this year  States he had a colonoscopy back in 2010 he believes  The following portions of the patient's history were reviewed and updated as appropriate: allergies, current medications, past family history, past medical history, past social history, past surgical history and problem list     Review of Systems     Review of Systems   Constitutional: Negative for activity change, appetite change and fatigue  Respiratory: Negative for apnea, cough, chest tightness, shortness of breath and wheezing  Cardiovascular: Positive for chest pain (Burning)  Negative for palpitations and leg swelling  Gastrointestinal: Negative for abdominal distention, abdominal pain, blood in stool, constipation, diarrhea, nausea and vomiting  Musculoskeletal: Negative for arthralgias, back pain, gait problem, joint swelling and myalgias  Skin: Negative for rash and wound     Neurological: Negative for dizziness, tremors, seizures, syncope, facial asymmetry, speech difficulty, weakness, light-headedness, numbness and headaches  Psychiatric/Behavioral: Negative for behavioral problems, confusion, hallucinations, sleep disturbance and suicidal ideas  The patient is not nervous/anxious  Active Problem List     Patient Active Problem List   Diagnosis    Coronary arteriosclerosis    Gastroesophageal reflux disease without esophagitis    Hyperlipidemia LDL goal <100    Essential hypertension    Obesity (BMI 30 0-34 9)     Objective     /82   Pulse 76   Temp (!) 97 4 °F (36 3 °C)   Resp 18   Ht 5' 6" (1 676 m)   Wt 93 4 kg (206 lb)   SpO2 95%   BMI 33 25 kg/m²     Physical Exam  GENERAL: Appears well-developed and well-nourished  Appears in no acute distress  Obese abdomen  NECK: Neck supple with no lymphadenopathy  Trachea midline  No JVD  CARDIOVASCULAR: S1 and S2 are present  Regular rate and rhythm  No murmurs, rubs, or gallops  RESPIRATORY: CTA B/L, no rales, rhonci or wheezes  Normal respiratory expansion  ABDOMINAL: Bowel sounds present in all 4 quadrants, non-tender, soft, non-distended  No organomegaly, rebound, or guarding  EXTREMITIES: 2+ DP and PT pulses bilaterally; no cyanosis, clubbing, edema  ROM intact  VALDEZ x4   SKIN: Skin is warm and dry  No skin lesions are present  No rashes  PSYCHIATRIC: Normal mood and affect     Pertinent Laboratory/Diagnostic Studies:    Laboratory Results: I have personally reviewed the pertinent laboratory results/reports   Radiology/Other Diagnostic Testing Results: I have personally reviewed pertinent reports  Results for orders placed during the hospital encounter of 02/15/18   NM myocardial perfusion spect (rx stress and/or rest)    SUMMARY:  -  Stress results: There was no chest pain during stress  -  ECG conclusions: The stress ECG was negative for ischemia and normal   -  Perfusion imaging: There was a small, mildly severe, fixed myocardial perfusion defect of the apical wall likely due to low counts    -  Gated SPECT: The calculated left ventricular ejection fraction was 57 %  Left ventricular ejection fraction was within normal limits by visual estimate  There was no left ventricular regional abnormality  IMPRESSIONS: Normal study  There was image artifact, without diagnostic evidence for perfusion abnormality  Left ventricular systolic function was normal     Prepared and signed by    Nika Whittaker MD  Signed 02/15/2018 17:57:29       Results for orders placed during the hospital encounter of 02/15/18   Echo complete with contrast if indicated    SUMMARY    LEFT VENTRICLE:  Systolic function was normal  Ejection fraction was estimated in the range of 55 % to 65 %  There were no regional wall motion abnormalities  Doppler parameters were consistent with abnormal left ventricular relaxation (grade 1 diastolic dysfunction)  TRICUSPID VALVE:  There was trace regurgitation  HISTORY: PRIOR HISTORY: Medication-treated hyperlipidemia  CAD  Risk factors: a family history of coronary artery disease  Current Medications       Current Outpatient Prescriptions:     aspirin 81 MG tablet, Take by mouth, Disp: , Rfl:     Coenzyme Q10 (CO Q 10) 10 MG CAPS, Take by mouth, Disp: , Rfl:     nebivolol (BYSTOLIC) 10 mg tablet, Take 1 tablet by mouth daily, Disp: , Rfl:     Omega-3 Fatty Acids ([facility] FISH OIL) 1200 MG CAPS, [facility]S Fish Oil 1200 MG Oral Capsule; 0; 0; 06-Feb-2014;  Active, Disp: , Rfl:     ranitidine (ZANTAC) 150 mg tablet, Take 1 tablet by mouth daily, Disp: , Rfl:     rosuvastatin (CRESTOR) 20 MG tablet, Take 1 tablet by mouth daily, Disp: , Rfl:     nitroglycerin (NITROSTAT) 0 4 mg SL tablet, Place 0 4 mg under the tongue as needed for chest pain, Disp: , Rfl: 5    Health Maintenance     Health Maintenance   Topic Date Due    Hepatitis C Screening  1960    COLONOSCOPY  1960    DTaP,Tdap,and Td Vaccines (1 - Tdap) 08/29/1968    INFLUENZA VACCINE  09/01/2017    Depression Screening PHQ-9 08/14/2018    PNEUMOCOCCAL POLYSACCHARIDE VACCINE AGE 2-64 HIGH RISK  Completed    HIV SCREENING  Excluded     Immunization History   Administered Date(s) Administered    Influenza 12/16/2008    Influenza TIV (IM) 10/01/2017    Pneumococcal Polysaccharide PPV23 12/16/2008       Chela Nieto DO  [facility]

## 2018-03-08 DIAGNOSIS — E78.5 HYPERLIPIDEMIA, UNSPECIFIED HYPERLIPIDEMIA TYPE: Primary | ICD-10-CM

## 2018-03-08 RX ORDER — ROSUVASTATIN CALCIUM 20 MG/1
20 TABLET, COATED ORAL DAILY
Qty: 90 TABLET | Refills: 3 | Status: SHIPPED | OUTPATIENT
Start: 2018-03-08 | End: 2018-10-03 | Stop reason: SDUPTHER

## 2018-03-13 ENCOUNTER — TELEPHONE (OUTPATIENT)
Dept: INTERNAL MEDICINE CLINIC | Facility: CLINIC | Age: 58
End: 2018-03-13

## 2018-03-13 NOTE — TELEPHONE ENCOUNTER
PT REC'ED A CALL FROM [facility] THAT THEY HAVE HIS RX FOR CRESTOR    PT WILL GET THE MED FROM THERE, BUT SD FROM NOW ON IT NEEDS TO BE ORDERED THROUGH [facility]

## 2018-03-14 ENCOUNTER — APPOINTMENT (OUTPATIENT)
Dept: LAB | Facility: CLINIC | Age: 58
End: 2018-03-14
Payer: COMMERCIAL

## 2018-03-14 DIAGNOSIS — I25.10 CORONARY ARTERIOSCLEROSIS: Chronic | ICD-10-CM

## 2018-03-14 DIAGNOSIS — Z12.5 SCREENING FOR PROSTATE CANCER: ICD-10-CM

## 2018-03-14 LAB
ALBUMIN SERPL BCP-MCNC: 4.1 G/DL (ref 3.5–5)
ALP SERPL-CCNC: 53 U/L (ref 46–116)
ALT SERPL W P-5'-P-CCNC: 33 U/L (ref 12–78)
ANION GAP SERPL CALCULATED.3IONS-SCNC: 7 MMOL/L (ref 4–13)
AST SERPL W P-5'-P-CCNC: 32 U/L (ref 5–45)
BILIRUB SERPL-MCNC: 1.49 MG/DL (ref 0.2–1)
BUN SERPL-MCNC: 16 MG/DL (ref 5–25)
CALCIUM SERPL-MCNC: 8.8 MG/DL (ref 8.3–10.1)
CHLORIDE SERPL-SCNC: 107 MMOL/L (ref 100–108)
CHOLEST SERPL-MCNC: 123 MG/DL (ref 50–200)
CO2 SERPL-SCNC: 25 MMOL/L (ref 21–32)
CREAT SERPL-MCNC: 0.82 MG/DL (ref 0.6–1.3)
ERYTHROCYTE [DISTWIDTH] IN BLOOD BY AUTOMATED COUNT: 13.8 % (ref 11.6–15.1)
GFR SERPL CREATININE-BSD FRML MDRD: 99 ML/MIN/1.73SQ M
GLUCOSE SERPL-MCNC: 75 MG/DL (ref 65–140)
HCT VFR BLD AUTO: 43.4 % (ref 36.5–49.3)
HDLC SERPL-MCNC: 57 MG/DL (ref 40–60)
HGB BLD-MCNC: 14.7 G/DL (ref 12–17)
LDLC SERPL CALC-MCNC: 47 MG/DL (ref 0–100)
MCH RBC QN AUTO: 30.1 PG (ref 26.8–34.3)
MCHC RBC AUTO-ENTMCNC: 33.9 G/DL (ref 31.4–37.4)
MCV RBC AUTO: 89 FL (ref 82–98)
PLATELET # BLD AUTO: 310 THOUSANDS/UL (ref 149–390)
PMV BLD AUTO: 10.7 FL (ref 8.9–12.7)
POTASSIUM SERPL-SCNC: 3.8 MMOL/L (ref 3.5–5.3)
PROT SERPL-MCNC: 7.3 G/DL (ref 6.4–8.2)
RBC # BLD AUTO: 4.89 MILLION/UL (ref 3.88–5.62)
SODIUM SERPL-SCNC: 139 MMOL/L (ref 136–145)
TRIGL SERPL-MCNC: 97 MG/DL
WBC # BLD AUTO: 8.43 THOUSAND/UL (ref 4.31–10.16)

## 2018-03-14 PROCEDURE — 80061 LIPID PANEL: CPT

## 2018-03-14 PROCEDURE — 85027 COMPLETE CBC AUTOMATED: CPT

## 2018-03-14 PROCEDURE — 80053 COMPREHEN METABOLIC PANEL: CPT

## 2018-03-14 PROCEDURE — 36415 COLL VENOUS BLD VENIPUNCTURE: CPT

## 2018-03-14 PROCEDURE — G0103 PSA SCREENING: HCPCS

## 2018-03-15 ENCOUNTER — TELEPHONE (OUTPATIENT)
Dept: INTERNAL MEDICINE CLINIC | Facility: CLINIC | Age: 58
End: 2018-03-15

## 2018-03-15 LAB — PSA SERPL-MCNC: 0.4 NG/ML (ref 0–4)

## 2018-03-15 NOTE — TELEPHONE ENCOUNTER
----- Message from Stephanie Wolff MA sent at 3/15/2018 10:04 AM EDT -----  Called and spoke with patient  Patient given lab results  ----- Message -----  From: Araseli Campbell DO  Sent: 3/15/2018   7:54 AM  To: [facility]    Let patient know all his lab testing was normal  Total cholesterol excellent at 123

## 2018-04-11 ENCOUNTER — OFFICE VISIT (OUTPATIENT)
Dept: CARDIOLOGY CLINIC | Facility: CLINIC | Age: 58
End: 2018-04-11
Payer: COMMERCIAL

## 2018-04-11 ENCOUNTER — APPOINTMENT (OUTPATIENT)
Dept: LAB | Facility: CLINIC | Age: 58
End: 2018-04-11
Payer: COMMERCIAL

## 2018-04-11 VITALS
SYSTOLIC BLOOD PRESSURE: 110 MMHG | DIASTOLIC BLOOD PRESSURE: 84 MMHG | HEIGHT: 66 IN | OXYGEN SATURATION: 98 % | HEART RATE: 71 BPM | BODY MASS INDEX: 32.24 KG/M2 | WEIGHT: 200.6 LBS

## 2018-04-11 DIAGNOSIS — E78.5 HYPERLIPIDEMIA LDL GOAL <100: Chronic | ICD-10-CM

## 2018-04-11 DIAGNOSIS — I10 ESSENTIAL HYPERTENSION: Chronic | ICD-10-CM

## 2018-04-11 DIAGNOSIS — I25.10 CORONARY ARTERIOSCLEROSIS: Chronic | ICD-10-CM

## 2018-04-11 DIAGNOSIS — R07.9 CHEST PAIN, UNSPECIFIED TYPE: Primary | ICD-10-CM

## 2018-04-11 LAB
ANION GAP SERPL CALCULATED.3IONS-SCNC: 6 MMOL/L (ref 4–13)
BUN SERPL-MCNC: 17 MG/DL (ref 5–25)
CALCIUM SERPL-MCNC: 9.1 MG/DL
CHLORIDE SERPL-SCNC: 110 MMOL/L (ref 100–108)
CO2 SERPL-SCNC: 25 MMOL/L (ref 21–32)
CREAT SERPL-MCNC: 0.9 MG/DL (ref 0.6–1.3)
ERYTHROCYTE [DISTWIDTH] IN BLOOD BY AUTOMATED COUNT: 13.5 % (ref 11.6–15.1)
GFR SERPL CREATININE-BSD FRML MDRD: 95 ML/MIN/1.73SQ M
GLUCOSE SERPL-MCNC: 84 MG/DL (ref 65–140)
HCT VFR BLD AUTO: 47.1 % (ref 36.5–49.3)
HGB BLD-MCNC: 15.5 G/DL (ref 12–17)
MCH RBC QN AUTO: 30.3 PG (ref 26.8–34.3)
MCHC RBC AUTO-ENTMCNC: 32.9 G/DL (ref 31.4–37.4)
MCV RBC AUTO: 92 FL (ref 82–98)
PLATELET # BLD AUTO: 321 THOUSANDS/UL (ref 149–390)
PMV BLD AUTO: 10.7 FL (ref 8.9–12.7)
POTASSIUM SERPL-SCNC: 3.9 MMOL/L (ref 3.5–5.3)
RBC # BLD AUTO: 5.12 MILLION/UL (ref 3.88–5.62)
SODIUM SERPL-SCNC: 141 MMOL/L (ref 136–145)
WBC # BLD AUTO: 10.52 THOUSAND/UL (ref 4.31–10.16)

## 2018-04-11 PROCEDURE — 80048 BASIC METABOLIC PNL TOTAL CA: CPT | Performed by: INTERNAL MEDICINE

## 2018-04-11 PROCEDURE — 36415 COLL VENOUS BLD VENIPUNCTURE: CPT | Performed by: INTERNAL MEDICINE

## 2018-04-11 PROCEDURE — 99214 OFFICE O/P EST MOD 30 MIN: CPT | Performed by: INTERNAL MEDICINE

## 2018-04-11 PROCEDURE — 85027 COMPLETE CBC AUTOMATED: CPT | Performed by: INTERNAL MEDICINE

## 2018-04-11 PROCEDURE — 85610 PROTHROMBIN TIME: CPT | Performed by: INTERNAL MEDICINE

## 2018-04-11 RX ORDER — RANOLAZINE 500 MG/1
500 TABLET, EXTENDED RELEASE ORAL 2 TIMES DAILY
COMMUNITY
End: 2019-06-13 | Stop reason: CLARIF

## 2018-04-11 NOTE — PROGRESS NOTES
Cardiology Follow Up    Gabby Munoz  1960  [id number]  [address] [facility] [address]    1  Chest pain, unspecified type     2  Coronary arteriosclerosis     3  Essential hypertension     4  Hyperlipidemia LDL goal <100       Chief Complaint   Patient presents with    Follow-up     Interval History: Patient reporting ongoing chest pain despite negative stress  He also had echo that was unremarkable  He describes it as chest burning that is somewhat random-- with rest or with exertion  He admits that he has not been very active in the recent past  He denies SOB out of the ordinary  He denies palpitation, dizziness, edema, orthopnea  Patient Active Problem List   Diagnosis    Coronary arteriosclerosis    Gastroesophageal reflux disease without esophagitis    Hyperlipidemia LDL goal <100    Essential hypertension    Obesity (BMI 30 0-34  9)     Past Medical History:   Diagnosis Date    CAD (coronary artery disease) 2009    50% RCA stenosis on coronary angiogram     GERD (gastroesophageal reflux disease)     Hyperlipidemia     Hypertension     Obesity     Schatzki's ring 02/07/2014     Social History     Social History    Marital status: /Civil Union     Spouse name: N/A    Number of children: N/A    Years of education: N/A     Occupational History    Not on file       Social History Main Topics    Smoking status: Never Smoker    Smokeless tobacco: Never Used    Alcohol use No      Comment: OCCASIONALLY PER [employer]SCRIPTS     Drug use: No    Sexual activity: Not on file     Other Topics Concern    Not on file     Social History Narrative    No narrative on file      Family History   Problem Relation Age of Onset    Coronary artery disease Father     Heart attack Father 40    Arrhythmia Father     Cancer Maternal Aunt      GI     Past Surgical History: Procedure Laterality Date    COLONOSCOPY  2010       Current Outpatient Prescriptions:     aspirin 81 MG tablet, Take by mouth, Disp: , Rfl:     Coenzyme Q10 (CO Q 10) 10 MG CAPS, Take by mouth, Disp: , Rfl:     nebivolol (BYSTOLIC) 10 mg tablet, Take 1 tablet by mouth daily, Disp: , Rfl:     ranitidine (ZANTAC) 150 mg tablet, Take 1 tablet by mouth daily, Disp: , Rfl:     rosuvastatin (CRESTOR) 20 MG tablet, Take 1 tablet (20 mg total) by mouth daily, Disp: 90 tablet, Rfl: 3    nitroglycerin (NITROSTAT) 0 4 mg SL tablet, Place 0 4 mg under the tongue as needed for chest pain, Disp: , Rfl: 5    Omega-3 Fatty Acids ([facility] FISH OIL) 1200 MG CAPS, [facility] Fish Oil 1200 MG Oral Capsule; 0; 0; 06-Feb-2014; Active, Disp: , Rfl:   No Known Allergies      Review of Systems:  Review of Systems   Constitutional: Negative for activity change, diaphoresis, fatigue, fever and unexpected weight change  HENT: Negative for nosebleeds and trouble swallowing  Eyes: Negative for visual disturbance  Respiratory: Negative for cough, chest tightness, shortness of breath and wheezing  Cardiovascular: Positive for chest pain  Negative for palpitations and leg swelling  Gastrointestinal: Negative for abdominal pain, anal bleeding, blood in stool and nausea  Endocrine: Negative for cold intolerance and heat intolerance  Genitourinary: Negative for decreased urine volume, frequency and hematuria  Musculoskeletal: Negative for myalgias  Skin: Negative for color change and wound  Neurological: Negative for dizziness, syncope, weakness, light-headedness and headaches  Psychiatric/Behavioral: Negative for sleep disturbance  The patient is not nervous/anxious  Physical Exam:  /84   Pulse 71   Ht 5' 6" (1 676 m)   Wt 91 kg (200 lb 9 6 oz)   SpO2 98%   BMI 32 38 kg/m²     Physical Exam   Constitutional: He is oriented to person, place, and time  He appears well-developed and well-nourished   No distress  HENT:   Head: Normocephalic and atraumatic  Eyes: Conjunctivae are normal  No scleral icterus  Neck: Neck supple  No JVD present  Cardiovascular: Normal rate and regular rhythm  No murmur heard  Pulmonary/Chest: Effort normal and breath sounds normal  No respiratory distress  He has no wheezes  He has no rales  Abdominal: He exhibits no distension  There is no tenderness  Musculoskeletal: He exhibits no edema  Neurological: He is alert and oriented to person, place, and time  Skin: Skin is warm and dry  No rash noted  He is not diaphoretic  Psychiatric: He has a normal mood and affect  Discussion/Summary:    Chest pain/CAD  -Known history of CAD with 50% RCA lesion that was not fixed on prior cath in 2009   -stress test 02/2018 was negative for ischemia, likewise echo was unremarkable EF 33-77%, grade 1 diastolic dysfunction  -concern that stress was false negative given ongoing symptoms, prior lesion in 2009, as well as decrease in functional capacity   symptoms may represent   -Options for further evaluation discussed with the patient  Benefits and limitations of stress testing explained  Definitive evaluation with cardiac catheterization advised  Risks, benefits, and possible outcomes of cardiac catheterization discussed  He was warned of the risks of infection, bleeding, kidney damage, stroke, MI  Patient agreeable to cardiac catheterization and wishes to proceed  Consent to be obtained by interventional cardiologist Dr Nora Peacock who will be performing the procedure    -lab slip for PT/INR, CBC, BMP given  -patient denies contrast allergy   -will give ranexa samples   Continue ASA, bystolic, crestor    Hypertension- BP stable     Hyperlipidemia- on statin    Follow-up in 2 weeks following cath

## 2018-04-12 LAB
INR PPP: 0.9 (ref 0.86–1.16)
PROTHROMBIN TIME: 12.1 SECONDS (ref 12.1–14.4)

## 2018-04-13 ENCOUNTER — TELEPHONE (OUTPATIENT)
Dept: CARDIOLOGY CLINIC | Facility: CLINIC | Age: 58
End: 2018-04-13

## 2018-04-13 NOTE — TELEPHONE ENCOUNTER
Please set up patient for heart cath at [facility] next week  Order in chart  Labs are also done  Thanks

## 2018-04-13 NOTE — TELEPHONE ENCOUNTER
PT WAS TOLD BY DR SANDHU IF HE DIDN'T HEAR FROM CATH TEAM BY Friday TO CALL  PLEASE CALL PT AND LET HIM KNOW WHEN HE SHOULD HEAR ABOUT CATH   Ying Smith

## 2018-04-17 ENCOUNTER — TELEPHONE (OUTPATIENT)
Dept: INTERVENTIONAL RADIOLOGY/VASCULAR | Facility: HOSPITAL | Age: 58
End: 2018-04-17

## 2018-04-17 RX ORDER — SODIUM CHLORIDE 9 MG/ML
75 INJECTION, SOLUTION INTRAVENOUS CONTINUOUS
Status: CANCELLED | OUTPATIENT
Start: 2018-04-19

## 2018-04-19 ENCOUNTER — HOSPITAL ENCOUNTER (OUTPATIENT)
Dept: INTERVENTIONAL RADIOLOGY/VASCULAR | Facility: HOSPITAL | Age: 58
Discharge: HOME | End: 2018-04-19
Payer: COMMERCIAL

## 2018-04-19 VITALS
HEART RATE: 62 BPM | WEIGHT: 200 LBS | DIASTOLIC BLOOD PRESSURE: 82 MMHG | OXYGEN SATURATION: 97 % | RESPIRATION RATE: 19 BRPM | BODY MASS INDEX: 32.14 KG/M2 | SYSTOLIC BLOOD PRESSURE: 130 MMHG | TEMPERATURE: 97.5 F | HEIGHT: 66 IN

## 2018-04-19 DIAGNOSIS — R07.9 CHEST PAIN, UNSPECIFIED TYPE: ICD-10-CM

## 2018-04-19 DIAGNOSIS — I25.10 CORONARY ARTERIOSCLEROSIS: Chronic | ICD-10-CM

## 2018-04-19 LAB
INR PPP: 0.89 (ref 0.86–1.16)
PROTHROMBIN TIME: 12.2 SECONDS (ref 12.1–14.4)

## 2018-04-19 PROCEDURE — 93458 L HRT ARTERY/VENTRICLE ANGIO: CPT | Performed by: PHYSICIAN ASSISTANT

## 2018-04-19 PROCEDURE — C1769 GUIDE WIRE: HCPCS | Performed by: PHYSICIAN ASSISTANT

## 2018-04-19 PROCEDURE — 93458 L HRT ARTERY/VENTRICLE ANGIO: CPT | Performed by: INTERNAL MEDICINE

## 2018-04-19 PROCEDURE — C1894 INTRO/SHEATH, NON-LASER: HCPCS | Performed by: PHYSICIAN ASSISTANT

## 2018-04-19 PROCEDURE — 99152 MOD SED SAME PHYS/QHP 5/>YRS: CPT | Performed by: PHYSICIAN ASSISTANT

## 2018-04-19 PROCEDURE — 85610 PROTHROMBIN TIME: CPT | Performed by: INTERNAL MEDICINE

## 2018-04-19 PROCEDURE — 99152 MOD SED SAME PHYS/QHP 5/>YRS: CPT | Performed by: INTERNAL MEDICINE

## 2018-04-19 RX ORDER — NITROGLYCERIN 20 MG/100ML
INJECTION INTRAVENOUS CODE/TRAUMA/SEDATION MEDICATION
Status: COMPLETED | OUTPATIENT
Start: 2018-04-19 | End: 2018-04-19

## 2018-04-19 RX ORDER — HEPARIN SODIUM 1000 [USP'U]/ML
INJECTION, SOLUTION INTRAVENOUS; SUBCUTANEOUS CODE/TRAUMA/SEDATION MEDICATION
Status: COMPLETED | OUTPATIENT
Start: 2018-04-19 | End: 2018-04-19

## 2018-04-19 RX ORDER — LIDOCAINE HYDROCHLORIDE 10 MG/ML
INJECTION, SOLUTION INFILTRATION; PERINEURAL CODE/TRAUMA/SEDATION MEDICATION
Status: COMPLETED | OUTPATIENT
Start: 2018-04-19 | End: 2018-04-19

## 2018-04-19 RX ORDER — SODIUM CHLORIDE 9 MG/ML
75 INJECTION, SOLUTION INTRAVENOUS CONTINUOUS
Status: DISCONTINUED | OUTPATIENT
Start: 2018-04-19 | End: 2018-04-23 | Stop reason: HOSPADM

## 2018-04-19 RX ORDER — VERAPAMIL HCL 2.5 MG/ML
AMPUL (ML) INTRAVENOUS CODE/TRAUMA/SEDATION MEDICATION
Status: COMPLETED | OUTPATIENT
Start: 2018-04-19 | End: 2018-04-19

## 2018-04-19 RX ORDER — SODIUM CHLORIDE 9 MG/ML
75 INJECTION, SOLUTION INTRAVENOUS CONTINUOUS
Status: DISPENSED | OUTPATIENT
Start: 2018-04-19 | End: 2018-04-19

## 2018-04-19 RX ORDER — FENTANYL CITRATE 50 UG/ML
INJECTION, SOLUTION INTRAMUSCULAR; INTRAVENOUS CODE/TRAUMA/SEDATION MEDICATION
Status: COMPLETED | OUTPATIENT
Start: 2018-04-19 | End: 2018-04-19

## 2018-04-19 RX ORDER — MIDAZOLAM HYDROCHLORIDE 1 MG/ML
INJECTION INTRAMUSCULAR; INTRAVENOUS CODE/TRAUMA/SEDATION MEDICATION
Status: COMPLETED | OUTPATIENT
Start: 2018-04-19 | End: 2018-04-19

## 2018-04-19 RX ADMIN — MIDAZOLAM HYDROCHLORIDE 1 MG: 1 INJECTION, SOLUTION INTRAMUSCULAR; INTRAVENOUS at 09:35

## 2018-04-19 RX ADMIN — MIDAZOLAM HYDROCHLORIDE 1 MG: 1 INJECTION, SOLUTION INTRAMUSCULAR; INTRAVENOUS at 09:39

## 2018-04-19 RX ADMIN — FENTANYL CITRATE 50 MCG: 50 INJECTION, SOLUTION INTRAMUSCULAR; INTRAVENOUS at 09:35

## 2018-04-19 RX ADMIN — VERAPAMIL HYDROCHLORIDE 2.5 MG: 2.5 INJECTION, SOLUTION INTRAVENOUS at 09:36

## 2018-04-19 RX ADMIN — SODIUM CHLORIDE 75 ML/HR: 0.9 INJECTION, SOLUTION INTRAVENOUS at 06:58

## 2018-04-19 RX ADMIN — LIDOCAINE HYDROCHLORIDE 1 ML: 10 INJECTION, SOLUTION INFILTRATION; PERINEURAL at 09:35

## 2018-04-19 RX ADMIN — SODIUM CHLORIDE 75 ML/HR: 0.9 INJECTION, SOLUTION INTRAVENOUS at 10:52

## 2018-04-19 RX ADMIN — IOHEXOL 35 ML: 350 INJECTION, SOLUTION INTRAVENOUS at 09:52

## 2018-04-19 RX ADMIN — HEPARIN SODIUM 5000 UNITS: 1000 INJECTION INTRAVENOUS; SUBCUTANEOUS at 09:50

## 2018-04-19 RX ADMIN — FENTANYL CITRATE 25 MCG: 50 INJECTION, SOLUTION INTRAMUSCULAR; INTRAVENOUS at 09:45

## 2018-04-19 RX ADMIN — NITROGLYCERIN 200 MCG: 20 INJECTION INTRAVENOUS at 09:36

## 2018-04-19 RX ADMIN — FENTANYL CITRATE 25 MCG: 50 INJECTION, SOLUTION INTRAMUSCULAR; INTRAVENOUS at 09:39

## 2018-04-19 NOTE — DISCHARGE SUMMARY
Discharge Summary - Adolph Carter 65 y o  male MRN: [id number]    Unit/Bed#:  Encounter: [id number]    Admission Date: 4/19/2018    Admitting Diagnosis: Coronary arteriosclerosis [I25 10]  Chest pain, unspecified type [R07 9]    HPI: Patient with persistent chest pain despite negative stress and unremarkable ECHO  Chest pain is burning, present with both rest and exertion  Had cardiac catheterization with mild nonobstructive CAD  No intervention performed  Procedures Performed:   Orders Placed This Encounter   Procedures    Cardiac catheterization       Hospital Course: Had cardiac catheterization with nonobstructive CAD  No PCI performed  No complications    Significant Findings, Care, Treatment and Services Provided: Mild CAD    Complications: None    Discharge Diagnosis: Mild nonobstructive CAD    Resolved Problems  Date Reviewed: 2/21/2018    None          Condition at Discharge: good     Discharge instructions/Information to patient and family:   See after visit summary for information provided to patient and family  Provisions for Follow-Up Care:  See after visit summary for information related to follow-up care and any pertinent home health orders  Disposition: Home    Planned Readmission: No    Discharge Statement   I spent 35 minutes discharging the patient  This time was spent on the day of discharge  I had direct contact with the patient on the day of discharge  Additional documentation is required if more than 30 minutes were spent on discharge  Discharge Medications:  See after visit summary for reconciled discharge medications provided to patient and family

## 2018-04-19 NOTE — DISCHARGE INSTRUCTIONS
After Heart Catheterization   AMBULATORY CARE:   Call 911 for any of the following:   · You have any of the following signs of a heart attack:      ¨ Squeezing, pressure, or pain in your chest that lasts longer than 5 minutes or returns    ¨ Discomfort or pain in your back, neck, jaw, stomach, or arm     ¨ Trouble breathing    ¨ Nausea or vomiting    ¨ Lightheadedness or a sudden cold sweat, especially with chest pain or trouble breathing    · You have any of the following signs of a stroke:      ¨ Numbness or drooping on one side of your face     ¨ Weakness in an arm or leg    ¨ Confusion or difficulty speaking    ¨ Dizziness, a severe headache, or vision loss    · You feel lightheaded, short of breath, and have chest pain  · You cough up blood  · You have trouble breathing  · You cannot stop the bleeding from your wound even after you hold firm pressure for 10 minutes  Seek care immediately if:   · Blood soaks through your bandage  · Your stitches come apart  · Your arm or leg feels numb, cool, or looks pale  · Your wound gets swollen quickly  Contact your healthcare provider if:   · You have a fever or chills  · Your wound is red, swollen, or draining pus  · Your wound looks more bruised or you have new bruising on the side of your leg or arm  · You have nausea or are vomiting  · Your skin is itchy, swollen, or you have a rash  · You have questions or concerns about your condition or care  Medicines: You may need any of the following:  · Blood thinners    help prevent blood clots  Examples of blood thinners include heparin and warfarin  Clots can cause strokes, heart attacks, and death  The following are general safety guidelines to follow while you are taking a blood thinner:    ¨ Watch for bleeding and bruising while you take blood thinners  Watch for bleeding from your gums or nose  Watch for blood in your urine and bowel movements   Use a soft washcloth on your skin, and a soft toothbrush to brush your teeth  This can keep your skin and gums from bleeding  If you shave, use an electric shaver  Do not play contact sports  ¨ Tell your dentist and other healthcare providers that you take anticoagulants  Wear a bracelet or necklace that says you take this medicine  ¨ Do not start or stop any medicines unless your healthcare provider tells you to  Many medicines cannot be used with blood thinners  ¨ Tell your healthcare provider right away if you forget to take the medicine, or if you take too much  ¨ Warfarin  is a blood thinner that you may need to take  The following are things you should be aware of if you take warfarin  § Foods and medicines can affect the amount of warfarin in your blood  Do not make major changes to your diet while you take warfarin  Warfarin works best when you eat about the same amount of vitamin K every day  Vitamin K is found in green leafy vegetables and certain other foods  Ask for more information about what to eat when you are taking warfarin  § You will need to see your healthcare provider for follow-up visits when you are on warfarin  You will need regular blood tests  These tests are used to decide how much medicine you need  · Acetaminophen  helps decrease pain and fever  This medicine is available without a doctor's order  Ask how much medicine is safe to take, and how often to take it  Acetaminophen can cause liver damage if not taken correctly  · Take your medicine as directed  Contact your healthcare provider if you think your medicine is not helping or if you have side effects  Tell him or her if you are allergic to any medicine  Keep a list of the medicines, vitamins, and herbs you take  Include the amounts, and when and why you take them  Bring the list or the pill bottles to follow-up visits  Carry your medicine list with you in case of an emergency  Bathing:   You may be able to shower the day after your procedure  Remove your pressure bandage before you shower  Do not take baths or go in hot tubs or pools  Carefully wash the wound with soap and water  Pat the area dry  Care for your wound as directed:  Change your bandage when it gets wet or dirty  A small bandage can be placed on your wound after you remove the pressure bandage  Do not put powders, lotions, or creams on your wound  They may cause your wound to get infected  Monitor your wound every day for signs of infection, such as redness, swelling, or pus  Mild bruising is normal and expected  If bleeding from your wound occurs:  Apply firm, steady pressure to stop the bleeding  Apply pressure with a clean gauze or towel for 5 to 10 minutes  Call 911 if bleeding becomes heavy or does not stop  Activity:  Do not lift anything heavier than 5 pounds until directed by your healthcare provider  Heavy lifting can put stress on your wound and cause bleeding  Do not push or pull with the arm that was used for the procedure  Do not do vigorous activity for at least 48 hours  Vigorous activity may cause bleeding from your wound  Rest and do quiet activities  Short walks to the bathroom and around the house are okay  Limit your stair climbing to prevent bleeding  Ask your healthcare provider when you can return to your normal activities  Do not strain when you have a bowel movement:  Your wound may bleed if you strain to have a bowel movement  Keep your legs flat on the floor and your hips at a 90° angle  Talk to your healthcare provider if you are constipated  You may need medicine to make it easier for you to have a bowel movement and to prevent straining  Drink liquids as directed:  Liquids will help flush the contrast liquid from your body  Ask how much liquid to drink each day and which liquids are best for you  Driving:  Ask your healthcare provider when it is okay for you to drive   He may tell you to wait 48 hours before you drive to decrease your risk for bleeding  Returning to work: You may not be able to return to work for at least 2 days after your procedure if your job involves heavy lifting  Ask your healthcare provider when it is okay for you to return to work  © 2017 [employer] Information is for End User's use only and may not be sold, redistributed or otherwise used for commercial purposes  All illustrations and images included in CareNotes® are the copyrighted property of A [employer] Inc  or Jaciel Santos  The above information is an  only  It is not intended as medical advice for individual conditions or treatments  Talk to your doctor, nurse or pharmacist before following any medical regimen to see if it is safe and effective for you

## 2018-04-23 ENCOUNTER — TELEPHONE (OUTPATIENT)
Dept: CARDIOLOGY CLINIC | Facility: CLINIC | Age: 58
End: 2018-04-23

## 2018-04-23 NOTE — TELEPHONE ENCOUNTER
Patient called and stated he had Cath done this past Thursday  PT stated that Dr Regis Maynard gave patient samples of Ranexa patient would like to know should he continue with the medication or stop   Call patient on mobile #

## 2018-04-24 NOTE — TELEPHONE ENCOUNTER
PT CALLED BACK AND IS WAITING TO HEAR IF HE SHOULD CONTINUE ON RANEXA  PLEASE CALL PT TODAY  PT ONLY HAS A FEW PILLS LEFT

## 2018-05-02 ENCOUNTER — APPOINTMENT (EMERGENCY)
Dept: CT IMAGING | Facility: HOSPITAL | Age: 58
End: 2018-05-02
Payer: OTHER MISCELLANEOUS

## 2018-05-02 ENCOUNTER — HOSPITAL ENCOUNTER (EMERGENCY)
Facility: HOSPITAL | Age: 58
Discharge: HOME | End: 2018-05-02
Attending: EMERGENCY MEDICINE | Admitting: EMERGENCY MEDICINE
Payer: OTHER MISCELLANEOUS

## 2018-05-02 ENCOUNTER — APPOINTMENT (EMERGENCY)
Dept: RADIOLOGY | Facility: HOSPITAL | Age: 58
End: 2018-05-02
Payer: OTHER MISCELLANEOUS

## 2018-05-02 VITALS
HEIGHT: 67 IN | WEIGHT: 200 LBS | RESPIRATION RATE: 19 BRPM | TEMPERATURE: 98.1 F | DIASTOLIC BLOOD PRESSURE: 84 MMHG | SYSTOLIC BLOOD PRESSURE: 133 MMHG | HEART RATE: 67 BPM | OXYGEN SATURATION: 97 % | BODY MASS INDEX: 31.39 KG/M2

## 2018-05-02 DIAGNOSIS — S89.91XA RIGHT KNEE INJURY, INITIAL ENCOUNTER: ICD-10-CM

## 2018-05-02 DIAGNOSIS — S82.141A TIBIAL PLATEAU FRACTURE, RIGHT, CLOSED, INITIAL ENCOUNTER: Primary | ICD-10-CM

## 2018-05-02 PROCEDURE — 73700 CT LOWER EXTREMITY W/O DYE: CPT

## 2018-05-02 PROCEDURE — 73564 X-RAY EXAM KNEE 4 OR MORE: CPT

## 2018-05-02 PROCEDURE — 99284 EMERGENCY DEPT VISIT MOD MDM: CPT

## 2018-05-02 RX ORDER — NAPROXEN 500 MG/1
500 TABLET ORAL EVERY 12 HOURS PRN
Qty: 20 TABLET | Refills: 0 | Status: SHIPPED | OUTPATIENT
Start: 2018-05-02 | End: 2019-06-13 | Stop reason: CLARIF

## 2018-05-02 RX ORDER — HYDROCODONE BITARTRATE AND ACETAMINOPHEN 5; 325 MG/1; MG/1
1 TABLET ORAL EVERY 6 HOURS PRN
Qty: 8 TABLET | Refills: 0 | Status: SHIPPED | OUTPATIENT
Start: 2018-05-02 | End: 2018-05-12

## 2018-05-02 RX ORDER — ACETAMINOPHEN 325 MG/1
650 TABLET ORAL ONCE
Status: COMPLETED | OUTPATIENT
Start: 2018-05-02 | End: 2018-05-02

## 2018-05-02 RX ORDER — IBUPROFEN 600 MG/1
600 TABLET ORAL ONCE
Status: COMPLETED | OUTPATIENT
Start: 2018-05-02 | End: 2018-05-02

## 2018-05-02 RX ADMIN — IBUPROFEN 600 MG: 600 TABLET ORAL at 16:27

## 2018-05-02 RX ADMIN — ACETAMINOPHEN 650 MG: 325 TABLET, FILM COATED ORAL at 16:27

## 2018-05-02 NOTE — DISCHARGE INSTRUCTIONS
Remain non-weight-bearing on right leg until seen and cleared by orthopedic surgery  Rest, elevate the leg, and apply ice  Take NSAIDs &/or tylenol for pain and vicodin for severe pain  Use the knee immobilizer and crutches to remain off right leg  Knee Immobilizer   WHAT YOU NEED TO KNOW:   A knee immobilizer limits knee movement  It is used after an injury or surgery to help your knee, muscles, or tendons heal    DISCHARGE INSTRUCTIONS:   How to safely use a knee immobilizer:   · Have your knee immobilizer fitted by your healthcare provider  It is important that your knee immobilizer is the right size for you and that it fits properly  · Wear your knee immobilizer as directed  It can be worn over your clothing  Check the fit of the knee immobilizer often  If it does not fit properly or slips out of place, it could cause further injury  · Use crutches as directed  You may need to avoid putting weight on your injured leg  Your healthcare provider will tell you if you need crutches and for how long  · Inspect your knee immobilizer often  Do not wear your knee immobilizer if it is damaged or broken  You may need to replace it if it becomes worn  · Ask your healthcare provider how to care for your knee immobilizer  You may be able to hand wash the fabric with mild soap and water  Do not place it in the washer or dryer  · Go to physical therapy as directed  A physical therapist can help you strengthen the muscles in your leg and help your knee heal   Contact your healthcare provider if:   · Your knee pain becomes worse when you wear your knee immobilizer  · Your skin is sore or raw after you wear your knee immobilizer  · Your leg feels numb or swells while you wear your knee immobilizer  · Your knee immobilizer is damaged  · You have questions or concerns about your condition or care    Seek care immediately or call 911 if:   · You have severe swelling or pain in your leg or knee  © 2017 [facility] Information is for End User's use only and may not be sold, redistributed or otherwise used for commercial purposes  All illustrations and images included in CareNotes® are the copyrighted property of A D A M [employer] Inc  or Jaciel Santos  The above information is an  only  It is not intended as medical advice for individual conditions or treatments  Talk to your doctor, nurse or pharmacist before following any medical regimen to see if it is safe and effective for you

## 2018-05-03 ENCOUNTER — OFFICE VISIT (OUTPATIENT)
Dept: OBGYN CLINIC | Facility: CLINIC | Age: 58
End: 2018-05-03
Payer: OTHER MISCELLANEOUS

## 2018-05-03 VITALS
HEART RATE: 61 BPM | SYSTOLIC BLOOD PRESSURE: 117 MMHG | HEIGHT: 66 IN | WEIGHT: 205 LBS | BODY MASS INDEX: 32.95 KG/M2 | DIASTOLIC BLOOD PRESSURE: 82 MMHG

## 2018-05-03 DIAGNOSIS — M25.561 ACUTE PAIN OF RIGHT KNEE: ICD-10-CM

## 2018-05-03 DIAGNOSIS — S82.141A CLOSED FRACTURE OF RIGHT TIBIAL PLATEAU, INITIAL ENCOUNTER: Primary | ICD-10-CM

## 2018-05-03 PROCEDURE — 99203 OFFICE O/P NEW LOW 30 MIN: CPT | Performed by: FAMILY MEDICINE

## 2018-05-03 NOTE — LETTER
May 3, 2018     Patient: Luda Mehta   YOB: 1960   Date of Visit: 5/3/2018       To Whom it May Concern:    Luda Mehta is under my professional care  He was seen in my office on 5/3/2018 for right knee injury  He may return to work on 5/7/2018 with limitations: Must wear knee brace at all times, and use crutches at all times for ambulating  No prolonged standing or ambulating for greater than 15 minutes at a time without a break  No climbing, squatting, kneeling  No lifting, pulling, pushing greater than 5 pounds  If you have any questions or concerns, please don't hesitate to call           Sincerely,          [employer], DO        CC: No Recipients

## 2018-05-03 NOTE — PROGRESS NOTES
Assessment/Plan:  Assessment/Plan   Diagnoses and all orders for this visit:    Closed fracture of right tibial plateau, initial encounter  -     MRI knee right  wo contrast; Future  -     Brace    Acute pain of right knee  -     MRI knee right  wo contrast; Future  -     Brace      50-year-old male status post right knee injury at work on 05/02/2018  Discussed with patient and his accompanying wife physical exam, CT scan result, impression, and plan  CT scan is noted for nondisplaced/nondepressed fracture of the right knee lateral tibial plateau  His physical exam is noted for lateral tibial plateau tenderness, limitation range of motion with extension -5 degrees and flexion to 120 degrees  Ligamentous laxity is not appreciated but there is positive Kasi's testing  At this time I will refer him for MRI of the right knee to evaluate for full extent of soft tissue derangement  I will place him in hinged knee brace and he is encouraged to continue with use of crutches to toe-touch weightbear  I discussed with patient that he can expect long recovery that can range from 8-12 weeks of the tibial plateau fracture  Pending review of MRI will discuss further treatment course  He will follow up with me after getting MRI done  Subjective:   Patient ID: Stanford Odom is a 65 y o  male  Chief Complaint   Patient presents with    Right Knee - Pain, Swelling       50-year-old male presents for evaluation of right knee pain and swelling following injury at work on 05/02/2018  While stepping from a truck to a loading dock his foot got caught and he fell over the ledge with his knee bending in valgus mechanism  He mainly felt a crack in the knee  He had immediate pain of the anterior lateral and medial aspect of the knee which he states at 1st was achy  He was able to bear weight however with antalgia  He continue to work however pain progressed so he presented to the emergency room    X-ray evaluation was unremarkable however CT scan revealed nondisplaced/nondepressed lateral tibial plateau fracture  He was placed in knee immobilizer, given crutches, and advised to follow up with orthopedic care  He denies any previous injury to the right knee  Knee Pain   This is a new problem  The current episode started yesterday  The problem occurs constantly  Associated symptoms include arthralgias and joint swelling  Pertinent negatives include no abdominal pain, chest pain, chills, fever, numbness, rash, sore throat or weakness  The symptoms are aggravated by standing and walking  He has tried immobilization and NSAIDs for the symptoms  The treatment provided mild relief  The following portions of the patient's history were reviewed and updated as appropriate: He  has a past medical history of CAD (coronary artery disease) (2009); GERD (gastroesophageal reflux disease); Hyperlipidemia; Hypertension; Obesity; and Schatzki's ring (02/07/2014)  He  has a past surgical history that includes Colonoscopy (2010); Esophagogastroduodenoscopy; and Cardiac catheterization (2009)  His family history includes Arrhythmia in his father; Cancer in his maternal aunt; Coronary artery disease in his father; Heart attack (age of onset: 40) in his father  He  reports that he has never smoked  He has never used smokeless tobacco  He reports that he drinks alcohol  He reports that he does not use drugs  He is allergic to dilaudid [hydromorphone hcl]       Review of Systems   Constitutional: Negative for chills and fever  HENT: Negative for sore throat  Eyes: Negative for visual disturbance  Respiratory: Negative for shortness of breath  Cardiovascular: Negative for chest pain  Gastrointestinal: Negative for abdominal pain  Genitourinary: Negative for difficulty urinating  Musculoskeletal: Positive for arthralgias and joint swelling  Skin: Negative for rash and wound     Neurological: Negative for weakness and numbness  Hematological: Does not bruise/bleed easily  Psychiatric/Behavioral: Negative for self-injury  Objective:  Vitals:    05/03/18 1010   BP: 117/82   Pulse: 61   Weight: 93 kg (205 lb)   Height: 5' 6" (1 676 m)     Right Ankle Exam   Swelling: none    Tenderness   The patient is experiencing no tenderness  Range of Motion   The patient has normal right ankle ROM  Muscle Strength   The patient has normal right ankle strength  Right Knee Exam     Tenderness   Right knee tenderness location: Lateral tibial plateau  Range of Motion   Extension: -5   Flexion: 120     Muscle Strength     The patient has normal right knee strength  Tests   Kasi:  Lateral - positive  Lachman:  Anterior - negative    Posterior - negative  Drawer:       Anterior - negative    Posterior - negative  Varus: negative  Valgus: negative    Other   Swelling: mild  Other tests: effusion present      Right Hip Exam     Tests   CHRISTOPHER: negative    Comments:    Negative FADDIR          Observations     Right Knee   Positive for effusion  Strength/Myotome Testing     Right Ankle/Foot   Normal strength      Physical Exam   Constitutional: He is oriented to person, place, and time  He appears well-developed  No distress  HENT:   Head: Normocephalic and atraumatic  Eyes: Conjunctivae are normal    Neck: No tracheal deviation present  Cardiovascular: Normal rate  Pulmonary/Chest: Effort normal  No respiratory distress  Abdominal: He exhibits no distension  Musculoskeletal:        Right knee: He exhibits effusion  Neurological: He is alert and oriented to person, place, and time  Skin: Skin is warm and dry  Psychiatric: He has a normal mood and affect  His behavior is normal        I have personally reviewed pertinent films in PACS  Right knee nondisplaced/nondepressed lateral tibial plateau fracture

## 2018-05-04 ENCOUNTER — TELEPHONE (OUTPATIENT)
Dept: OBGYN CLINIC | Facility: HOSPITAL | Age: 58
End: 2018-05-04

## 2018-05-04 NOTE — TELEPHONE ENCOUNTER
Call placed and spoke to patient  He stated that after wearing the Hinged knee brace he noticed his lower extremity was more swollen and cooler to the touch  He removed the brace and noticed improvement in the swelling  I advised patient to discontinue use of the brace, but continue with use of the crutches  Also advised patient that if he experiences significant swelling, redness or leg pain to go to the emergency room  Informed patient that I will have my staff reach out to him on 5/7/2018 to have him return to the office for re-fitting of the brace

## 2018-05-04 NOTE — TELEPHONE ENCOUNTER
Dr Lester Sauceda    Patient was in yesterday to see you  He was fitted for a hinged knee brace  Patient states that the girl said a small to a med brace and fit him into a medium  Today he says that he can not get the brace up properly and that his leg is now swollen and it is cutting off the circulation to his foot/toes  Please call as he will possibly need a different size brace

## 2018-05-09 ENCOUNTER — HOSPITAL ENCOUNTER (OUTPATIENT)
Dept: MRI IMAGING | Facility: CLINIC | Age: 58
Discharge: HOME | End: 2018-05-09
Payer: OTHER MISCELLANEOUS

## 2018-05-09 DIAGNOSIS — S82.141A CLOSED FRACTURE OF RIGHT TIBIAL PLATEAU, INITIAL ENCOUNTER: ICD-10-CM

## 2018-05-09 DIAGNOSIS — M25.561 ACUTE PAIN OF RIGHT KNEE: ICD-10-CM

## 2018-05-09 PROCEDURE — 73721 MRI JNT OF LWR EXTRE W/O DYE: CPT

## 2018-05-16 ENCOUNTER — OFFICE VISIT (OUTPATIENT)
Dept: OBGYN CLINIC | Facility: CLINIC | Age: 58
End: 2018-05-16
Payer: OTHER MISCELLANEOUS

## 2018-05-16 VITALS
WEIGHT: 205.03 LBS | DIASTOLIC BLOOD PRESSURE: 90 MMHG | HEART RATE: 78 BPM | BODY MASS INDEX: 34.16 KG/M2 | SYSTOLIC BLOOD PRESSURE: 138 MMHG | HEIGHT: 65 IN

## 2018-05-16 DIAGNOSIS — S82.121A CLOSED FRACTURE OF LATERAL PORTION OF RIGHT TIBIAL PLATEAU, INITIAL ENCOUNTER: Primary | ICD-10-CM

## 2018-05-16 DIAGNOSIS — M24.10 ACQUIRED DEFECT OF ARTICULAR CARTILAGE: ICD-10-CM

## 2018-05-16 DIAGNOSIS — S83.242A OTHER TEAR OF MEDIAL MENISCUS OF LEFT KNEE AS CURRENT INJURY, INITIAL ENCOUNTER: ICD-10-CM

## 2018-05-16 PROCEDURE — 99213 OFFICE O/P EST LOW 20 MIN: CPT | Performed by: FAMILY MEDICINE

## 2018-05-16 NOTE — PROGRESS NOTES
Assessment/Plan:  Assessment/Plan   Diagnoses and all orders for this visit:    Closed fracture of lateral portion of right tibial plateau, initial encounter    Acquired defect of articular cartilage    Other tear of medial meniscus of right knee as current injury, initial encounter         52-year-old male status post right knee injury  Discussed with patient physical exam, MRI findings, impression, and plan  MRI is noted for slightly depressed lateral tibial plateau fracture, medial meniscus posterior horn tear, and grade 3 cartilage defect of lateral patellar facet and of the trochlea  His physical exam is noted for lateral tibial plateau tenderness and mild tenderness of medial joint line, with extension restricted to -5 degrees and flexion to 130°  I discussed with him to continue course of current treatment of wearing hinged knee brace and use of crutches to toe-touch weightbear as tolerated  I advised that when he is not bearing weight he is to stretch his hamstrings and his quads  Subjective:   Patient ID: Yancy Alvares is a 65 y o  male  Chief Complaint   Patient presents with    Right Knee - Pain, Follow-up         52-year-old male following up for right knee pain after injury at work on 05/02/2018  He was noted to have lateral tibial plateau slightly depressed fracture and was sent for MRI to evaluate for internal derangement  Today he reports that he has been using the crutches and hinged knee brace as directed, and limiting weight-bearing  Has noticed improved pain and swelling since his previous visit  Pain is still mostly appreciated at the anterior lateral aspect of the knee, nonradiating, constant, and mild in intensity  He has not had any new injury since last visit  Knee Pain   This is a new problem  The current episode started 1 to 4 weeks ago  The problem occurs constantly  The problem has been gradually improving  Associated symptoms include arthralgias and joint swelling  Pertinent negatives include no numbness or weakness  The symptoms are aggravated by walking and standing  He has tried rest for the symptoms  The treatment provided mild relief  Review of Systems   Musculoskeletal: Positive for arthralgias and joint swelling  Neurological: Negative for weakness and numbness  Objective:  Vitals:    05/16/18 1053   BP: 138/90   Pulse: 78   Weight: 93 kg (205 lb 0 4 oz)   Height: 5' 5" (1 651 m)     Right Knee Exam     Tenderness   The patient is experiencing tenderness in the medial joint line (Lateral tibial plateau)  Range of Motion   Extension: -5   Flexion: 120     Muscle Strength     The patient has normal right knee strength  Tests   Varus: negative  Valgus: negative    Other   Swelling: none  Other tests: no effusion present          Observations     Right Knee   Negative for effusion  Physical Exam   Constitutional: He is oriented to person, place, and time  He appears well-developed  No distress  HENT:   Head: Normocephalic and atraumatic  Eyes: Conjunctivae are normal    Neck: No tracheal deviation present  Cardiovascular: Normal rate  Pulmonary/Chest: Effort normal  No respiratory distress  Abdominal: He exhibits no distension  Musculoskeletal:        Right knee: He exhibits no effusion  Neurological: He is alert and oriented to person, place, and time  Skin: Skin is warm and dry  Psychiatric: He has a normal mood and affect  His behavior is normal    Nursing note and vitals reviewed  I have personally reviewed pertinent films in PACS

## 2018-05-16 NOTE — LETTER
May 16, 2018     Patient: Cami Langley   YOB: 1960   Date of Visit: 5/16/2018       To Whom it May Concern:    Cami Langley is under my professional care  He was seen in my office on 5/16/2018  He may work with limitations: Must wear knee brace at all times, and use crutches at all times for ambulating  No prolonged standing or ambulating for greater than 15 minutes at a time without a break  No climbing, squatting, kneeling  No lifting, pulling, pushing greater than 5 pounds  He will be re-evaluated in 4 weeks  If you have any questions or concerns, please don't hesitate to call           Sincerely,          [employer], DO        CC: No Recipients

## 2018-06-13 ENCOUNTER — OFFICE VISIT (OUTPATIENT)
Dept: OBGYN CLINIC | Facility: CLINIC | Age: 58
End: 2018-06-13
Payer: OTHER MISCELLANEOUS

## 2018-06-13 VITALS
DIASTOLIC BLOOD PRESSURE: 89 MMHG | HEIGHT: 65 IN | SYSTOLIC BLOOD PRESSURE: 134 MMHG | HEART RATE: 65 BPM | WEIGHT: 204 LBS | BODY MASS INDEX: 33.99 KG/M2

## 2018-06-13 DIAGNOSIS — S83.241D OTHER TEAR OF MEDIAL MENISCUS OF RIGHT KNEE AS CURRENT INJURY, SUBSEQUENT ENCOUNTER: ICD-10-CM

## 2018-06-13 DIAGNOSIS — M24.10 ACQUIRED DEFECT OF ARTICULAR CARTILAGE: ICD-10-CM

## 2018-06-13 DIAGNOSIS — S82.121D CLOSED FRACTURE OF LATERAL PORTION OF RIGHT TIBIAL PLATEAU WITH ROUTINE HEALING, SUBSEQUENT ENCOUNTER: Primary | ICD-10-CM

## 2018-06-13 PROCEDURE — 99213 OFFICE O/P EST LOW 20 MIN: CPT | Performed by: FAMILY MEDICINE

## 2018-06-13 NOTE — LETTER
June 13, 2018     Patient: Gabby Munoz   YOB: 1960   Date of Visit: 6/13/2018       To Whom it May Concern:    Gabby Munoz is under my professional care  He was seen in my office on 6/13/2018  He may work with limitations  Must wear knee brace at all times, and use crutches at all times for ambulating  No prolonged standing or ambulating for greater than 15 minutes at a time without a break  No climbing, squatting, kneeling  No lifting, pulling, pushing greater than 5 pounds  If you have any questions or concerns, please don't hesitate to call           Sincerely,          [employer], DO        CC: No Recipients

## 2018-06-13 NOTE — PROGRESS NOTES
Assessment/Plan:  Assessment/Plan   Diagnoses and all orders for this visit:    Closed fracture of lateral portion of right tibial plateau with routine healing, subsequent encounter    Acquired defect of articular cartilage    Other tear of medial meniscus of right knee as current injury, subsequent encounter         60-year-old male status post right knee injury from incident at work on 05/02/2018  Discussed with patient physical exam, impression, and plan  He reports improvement in symptoms and his clinical exam is currently remarkable for only mild tenderness of the medial joint line  He does have full range of motion and intact strength  I had re-emphasized the patient that typical healing time can vary from 8-12 weeks and that he is currently at 6 weeks  I have instructed him that in 2 weeks he is to start gradually increasing weight-bearing as tolerated, however not to the point of pain  He is to wear the brace at all times, but refrain from any heavy lifting  He will then follow up with me in 4 weeks at which point he will be re-evaluated and if still having pain we will consider imaging to assess for healing  Subjective:   Patient ID: Hellen Dorantes is a 65 y o  male  Chief Complaint   Patient presents with    Right Knee - Pain, Follow-up       60-year-old male following up for right knee pain after injury at work on 05/02/2018  He was last seen 4 weeks ago which point MRI reviewed him was noted for right medial tibial plateau fracture, medial meniscus tear,  And grade 3 articular cartilage defect of the lateral patellar facet  He was advised to continue with wearing hinged knee brace and use of crutches to toe-touch weightbear as tolerated  Today reports he has had improvement in pain of the knee, stating that most of time experiences achiness  Atif Tayler He partially bears weight while standing in the shower or while at a standstill reports he has been able to tolerate without pain    He has been continue with home exercise with active extension and flexion to maintain good range of motion  Does report that with repetitive activity he notices more swelling of the knee for which he takes ibuprofen and this helps with achiness and swelling  Knee Pain   This is a new problem  The current episode started more than 1 month ago  The problem occurs constantly  The problem has been gradually improving  Associated symptoms include arthralgias and joint swelling  The symptoms are aggravated by standing  He has tried rest for the symptoms  The treatment provided mild relief  Review of Systems   Musculoskeletal: Positive for arthralgias and joint swelling  Objective:  Vitals:    06/13/18 1131   BP: 134/89   Pulse: 65   Weight: 92 5 kg (204 lb)   Height: 5' 5" (1 651 m)     Right Knee Exam     Tenderness   The patient is experiencing tenderness in the medial joint line  Range of Motion   The patient has normal right knee ROM  Other   Swelling: mild  Other tests: no effusion present          Observations     Right Knee   Negative for effusion  Physical Exam   Constitutional: He is oriented to person, place, and time  He appears well-developed  No distress  HENT:   Head: Normocephalic and atraumatic  Eyes: Conjunctivae are normal    Neck: No tracheal deviation present  Cardiovascular: Normal rate  Pulmonary/Chest: Effort normal  No respiratory distress  Abdominal: He exhibits no distension  Musculoskeletal:        Right knee: He exhibits no effusion  Neurological: He is alert and oriented to person, place, and time  Skin: Skin is warm and dry  Psychiatric: He has a normal mood and affect  His behavior is normal    Nursing note and vitals reviewed

## 2018-06-13 NOTE — LETTER
June 13, 2018     Patient: Yancy Alvares   YOB: 1960   Date of Visit: 6/13/2018       To Whom it May Concern:    Yancy Alvares is under my professional care  He was seen in my office on 6/13/2018  Continue with toe-touch weightbear    Starting June 27, 2018 may start to increase weight bear as tolerated  Do not bear weight to level of having pain  Continue to wear knee brace while walking  No heavy lifting  If you have any questions or concerns, please don't hesitate to call           Sincerely,          [employer], DO        CC: No Recipients

## 2018-07-11 ENCOUNTER — OFFICE VISIT (OUTPATIENT)
Dept: OBGYN CLINIC | Facility: CLINIC | Age: 58
End: 2018-07-11
Payer: OTHER MISCELLANEOUS

## 2018-07-11 VITALS
DIASTOLIC BLOOD PRESSURE: 89 MMHG | WEIGHT: 202 LBS | HEIGHT: 67 IN | BODY MASS INDEX: 31.71 KG/M2 | SYSTOLIC BLOOD PRESSURE: 128 MMHG | HEART RATE: 73 BPM

## 2018-07-11 DIAGNOSIS — S83.241D OTHER TEAR OF MEDIAL MENISCUS OF RIGHT KNEE AS CURRENT INJURY, SUBSEQUENT ENCOUNTER: ICD-10-CM

## 2018-07-11 DIAGNOSIS — M24.10 ACQUIRED DEFECT OF ARTICULAR CARTILAGE: ICD-10-CM

## 2018-07-11 DIAGNOSIS — S82.121D CLOSED FRACTURE OF LATERAL PORTION OF RIGHT TIBIAL PLATEAU WITH ROUTINE HEALING, SUBSEQUENT ENCOUNTER: Primary | ICD-10-CM

## 2018-07-11 PROCEDURE — 99213 OFFICE O/P EST LOW 20 MIN: CPT | Performed by: FAMILY MEDICINE

## 2018-07-11 NOTE — PROGRESS NOTES
Assessment/Plan:  Assessment/Plan   Diagnoses and all orders for this visit:    Closed fracture of lateral portion of right tibial plateau with routine healing, subsequent encounter  -     Cancel: Ambulatory referral to Physical Therapy; Future  -     Ambulatory referral to Physical Therapy; Future    Acquired defect of articular cartilage    Other tear of medial meniscus of right knee as current injury, subsequent encounter  -     Cancel: Ambulatory referral to Physical Therapy; Future  -     Ambulatory referral to Physical Therapy; Future        52-year-old male status post right knee fracture lateral tibial plateau from injury at work on 05/02/2018  Discussed with patient physical exam, impression, and plan  Physical exam is unremarkable for any bony or soft tissue tenderness  He denies any pain at this time  I discussed with patient at this time I recommend formal physical therapy, however patient desires home exercise program   I will refer him a for physical therapy evaluation and to be provided with home exercise instruction  He is to begin weaning out of hinged knee brace, if he may ambulate without use of the hinged knee brace if he expects to be standing or ambulating for less than 30 minutes at a time  He will follow up with me in 4 weeks at which point he will be re-evaluated  Subjective:   Patient ID: Alonso Ramirez is a 65 y o  male  Chief Complaint   Patient presents with    Right Knee - Follow-up        52-year-old male following up for right knee lateral tibial plateau fracture sustained from injury at work on 05/02/2018  He was last seen 4 weeks ago at which point he was advised on gradual increase on weight-bearing as tolerated  He states that up until about 1 and a half weeks ago he was ambulating with both crutches, then for few more days ambulated which was 1 crutch, and for the past few days has been ambulating without use of crutches   He has been wearing hinged knee brace while at work as he spends lot of time on his feet, however has not been wearing it around the house  He denies any pain of the knee, any buckling or instability, or any swelling  He has not had any new injury since last visit  Knee Pain   This is a new problem  The current episode started more than 1 month ago  The problem has been gradually improving  Pertinent negatives include no arthralgias, joint swelling, numbness or weakness  Nothing aggravates the symptoms  He has tried rest for the symptoms  The treatment provided moderate relief  Review of Systems   Musculoskeletal: Negative for arthralgias and joint swelling  Neurological: Negative for weakness and numbness  Objective:  Vitals:    07/11/18 1552   BP: 128/89   Pulse: 73   Weight: 91 6 kg (202 lb)   Height: 5' 6 5" (1 689 m)     Right Knee Exam     Tenderness   The patient is experiencing no tenderness  Range of Motion   The patient has normal right knee ROM  Muscle Strength     The patient has normal right knee strength  Other   Swelling: none  Other tests: no effusion present          Observations     Right Knee   Negative for effusion  Physical Exam   Constitutional: He is oriented to person, place, and time  He appears well-developed  No distress  HENT:   Head: Normocephalic and atraumatic  Eyes: Conjunctivae are normal    Neck: No tracheal deviation present  Cardiovascular: Normal rate  Pulmonary/Chest: Effort normal  No respiratory distress  Abdominal: He exhibits no distension  Musculoskeletal:        Right knee: He exhibits no effusion  Neurological: He is alert and oriented to person, place, and time  Skin: Skin is warm and dry  Psychiatric: He has a normal mood and affect  His behavior is normal    Nursing note and vitals reviewed

## 2018-07-11 NOTE — LETTER
July 11, 2018     Patient: Scherrie Fleischer   YOB: 1960   Date of Visit: 7/11/2018       To Whom it May Concern:    Scherrie Fleischer is under my professional care  He was seen in my office on 7/11/2018  He may work with limitations: May wear brace if needed  No lifting, pulling , or pushing more than 30 pounds  He will be re-evaluated in 4 weeks  If you have any questions or concerns, please don't hesitate to call           Sincerely,          [employer], DO        CC: No Recipients

## 2018-07-19 DIAGNOSIS — I10 ESSENTIAL HYPERTENSION: Primary | ICD-10-CM

## 2018-07-19 RX ORDER — NEBIVOLOL HYDROCHLORIDE 10 MG/1
TABLET ORAL
Qty: 90 TABLET | Refills: 3 | Status: SHIPPED | OUTPATIENT
Start: 2018-07-19 | End: 2019-05-16 | Stop reason: SDUPTHER

## 2018-08-01 ENCOUNTER — EVALUATION (OUTPATIENT)
Dept: PHYSICAL THERAPY | Facility: CLINIC | Age: 58
End: 2018-08-01
Payer: OTHER MISCELLANEOUS

## 2018-08-01 DIAGNOSIS — S83.242D OTHER TEAR OF MEDIAL MENISCUS OF LEFT KNEE AS CURRENT INJURY, SUBSEQUENT ENCOUNTER: ICD-10-CM

## 2018-08-01 DIAGNOSIS — S82.122D CLOSED FRACTURE OF LATERAL PORTION OF LEFT TIBIAL PLATEAU WITH ROUTINE HEALING, SUBSEQUENT ENCOUNTER: Primary | ICD-10-CM

## 2018-08-01 PROCEDURE — G8979 MOBILITY GOAL STATUS: HCPCS | Performed by: PHYSICAL THERAPIST

## 2018-08-01 PROCEDURE — G8978 MOBILITY CURRENT STATUS: HCPCS | Performed by: PHYSICAL THERAPIST

## 2018-08-01 PROCEDURE — 97110 THERAPEUTIC EXERCISES: CPT | Performed by: PHYSICAL THERAPIST

## 2018-08-01 PROCEDURE — 97161 PT EVAL LOW COMPLEX 20 MIN: CPT | Performed by: PHYSICAL THERAPIST

## 2018-08-01 NOTE — PROGRESS NOTES
PT Evaluation  and PT Discharge    Today's date: 2018  Patient name: Cami Langley  : 1960  MRN: [id number]  Referring provider: Kinza Blanco DO  Dx:   Encounter Diagnosis     ICD-10-CM    1  Closed fracture of lateral portion of left tibial plateau with routine healing, subsequent encounter S82 122D Ambulatory referral to Physical Therapy   2  Other tear of medial meniscus of left knee as current injury, subsequent encounter S83 242D Ambulatory referral to Physical Therapy                  Assessment  Functional limitations: No functional limiations present  Assessment details: Cami Langley is a 65year old male presenting to physical therapy s/p tibial plateau fracture  At this time he has full ROM, full strength, and no functional limitations evidenced by his score on his functional outcome  He was provided a HEP to maintain mobility and strength of the lower extremity, otherwise, he is able to complete all functional tasks as he would like  Because of this, patient is being seen for a 1x visit, was provided a HEP, and was advised to follow up with his physician if he has any increase in symptoms  If HEP increases symptoms, he was advised to stop exercises  Verbal understanding of exercises demonstrated  DC from PT    Goals  1x  Visit to HEP  Independent with HEP in 4 weeks    Plan  Planned therapy interventions: therapeutic exercise  Plan details: DC from PT eval only        Subjective Evaluation    History of Present Illness  Mechanism of injury: Patient reports that he has been having some R knee discomfort and burning  STEFANY was stepping on a loading dock, misplaced his foot and his knee bent sideways  3-4 hours later he went to the ED, had x-rays and CT of the knee and CT showed a crack of the tibial plateau  He followed up with orthopedics and he was given a knee brace to wear  He is now WBAT per physician  He is to be wearing his brace if he is walking for more than 30 minutes  Follow up with orthopedics is next week  He denies weakness and denies loss of ROM at this time  He reports that currently, going up and down stairs and completing squats are no longer an issue for him  Patient works as a supervisor for custodians at [employer]    DOI is 18  Pain  Current pain ratin  At best pain ratin  At worst pain ratin    Patient Goals  Patient goals for therapy: return to work          Objective     Tenderness     Additional Tenderness Details  Denies tenderness to palpation    Neurological Testing     Sensation     Knee     Right Knee   Intact: light touch     Active Range of Motion     Right Knee   Flexion: WFL  Extension: WFL    Passive Range of Motion     Right Knee   Normal passive range of motion    Strength/Myotome Testing     Right Knee   Normal strength    Ambulation     Comments   WBAT, No     General Comments     Knee Comments  No knee valgus with squat  Able to go up and down stairs  Able to rise from chair with           Precautions: R tibial plateau fracture    Daily Treatment Diary     Manual                                                                                   Exercise Diary              Heel slides 5"x10            HSS 30"x3            SLR x2 2x10                                                                                                                                                                                                                                             Modalities

## 2018-08-08 ENCOUNTER — OFFICE VISIT (OUTPATIENT)
Dept: OBGYN CLINIC | Facility: CLINIC | Age: 58
End: 2018-08-08
Payer: OTHER MISCELLANEOUS

## 2018-08-08 VITALS
BODY MASS INDEX: 33.75 KG/M2 | HEART RATE: 73 BPM | HEIGHT: 66 IN | SYSTOLIC BLOOD PRESSURE: 116 MMHG | DIASTOLIC BLOOD PRESSURE: 85 MMHG | WEIGHT: 210 LBS

## 2018-08-08 DIAGNOSIS — S82.121D CLOSED FRACTURE OF LATERAL PORTION OF RIGHT TIBIAL PLATEAU WITH ROUTINE HEALING, SUBSEQUENT ENCOUNTER: Primary | ICD-10-CM

## 2018-08-08 DIAGNOSIS — S83.241D ACUTE TEAR MEDIAL MENISCUS, RIGHT, SUBSEQUENT ENCOUNTER: ICD-10-CM

## 2018-08-08 DIAGNOSIS — M24.10 ACQUIRED DEFECT OF ARTICULAR CARTILAGE: ICD-10-CM

## 2018-08-08 PROCEDURE — 99213 OFFICE O/P EST LOW 20 MIN: CPT | Performed by: FAMILY MEDICINE

## 2018-08-08 NOTE — PROGRESS NOTES
Assessment/Plan:  Assessment/Plan   Diagnoses and all orders for this visit:    Closed fracture of lateral portion of right tibial plateau with routine healing, subsequent encounter    Acute tear medial meniscus, right, subsequent encounter    Acquired defect of articular cartilage      64-year-old male status post lateral tibial plateau fracture and medial meniscus tear of the right knee after injury at work on 05/02/2018  Discussed with patient physical exam, impression, and plan  Physical exam of the right knee is unremarkable for any bony soft tissue tenderness or any reproduction of pain with ligamentous and meniscal testing  Clinical impression is that he is recovered from his injury  He may return to work and activities without restriction  He need only follow up with me on an as-needed basis  Subjective:   Patient ID: Yariel Escoto is a 65 y o  male  Chief Complaint   Patient presents with    Right Knee - Pain, Follow-up         64-year-old male following up for right knee lateral tibial plateau fracture and medial meniscus tear sustained from injury at work on 05/02/2018  He was last seen 4 weeks ago at which point he was referred to physical therapy and advised on weaning out of hinged knee brace  Today reports they had been without any pain of the knee  He has been ambulating, squatting, working, and doing daily tasks without any issue or instability  He was provided home exercise program by physical therapy has been doing exercises as directed  He has not had any new injury since last visit  Knee Pain   This is a new problem  The current episode started more than 1 month ago  The problem has been resolved  Pertinent negatives include no arthralgias, joint swelling, numbness or weakness  Nothing aggravates the symptoms  He has tried rest (Home exercise program) for the symptoms  The treatment provided significant relief                 Review of Systems   Musculoskeletal: Negative for arthralgias and joint swelling  Neurological: Negative for weakness and numbness  Objective:  Vitals:    08/08/18 1520   BP: 116/85   Pulse: 73   Weight: 95 3 kg (210 lb)   Height: 5' 6" (1 676 m)     Right Knee Exam     Tenderness   The patient is experiencing no tenderness  Range of Motion   The patient has normal right knee ROM  Muscle Strength     The patient has normal right knee strength  Tests   Kasi:  Medial - negative Lateral - negative  Varus: negative  Valgus: negative    Other   Swelling: none  Other tests: no effusion present          Observations     Right Knee   Negative for effusion  Physical Exam   Constitutional: He is oriented to person, place, and time  He appears well-developed  No distress  HENT:   Head: Normocephalic and atraumatic  Eyes: Conjunctivae are normal    Neck: No tracheal deviation present  Cardiovascular: Normal rate  Pulmonary/Chest: Effort normal  No respiratory distress  Abdominal: He exhibits no distension  Musculoskeletal:        Right knee: He exhibits no effusion  Neurological: He is alert and oriented to person, place, and time  Skin: Skin is warm and dry  Psychiatric: He has a normal mood and affect  His behavior is normal    Nursing note and vitals reviewed

## 2018-08-08 NOTE — LETTER
August 8, 2018     Patient: Cami Langley   YOB: 1960   Date of Visit: 8/8/2018       To Whom it May Concern:    Cami Langley is under my professional care  He was seen in my office on 8/8/2018  He may return to work full duty, without restrictions  If you have any questions or concerns, please don't hesitate to call           Sincerely,          Fatimah [employer], DO        CC: No Recipients

## 2018-10-03 DIAGNOSIS — E78.5 HYPERLIPIDEMIA, UNSPECIFIED HYPERLIPIDEMIA TYPE: ICD-10-CM

## 2018-10-03 RX ORDER — ROSUVASTATIN CALCIUM 20 MG/1
20 TABLET, COATED ORAL DAILY
Qty: 90 TABLET | Refills: 3 | Status: SHIPPED | OUTPATIENT
Start: 2018-10-03 | End: 2019-09-30 | Stop reason: SDUPTHER

## 2019-05-16 ENCOUNTER — OFFICE VISIT (OUTPATIENT)
Dept: CARDIOLOGY CLINIC | Facility: CLINIC | Age: 59
End: 2019-05-16
Payer: COMMERCIAL

## 2019-05-16 VITALS
WEIGHT: 182 LBS | DIASTOLIC BLOOD PRESSURE: 74 MMHG | OXYGEN SATURATION: 98 % | BODY MASS INDEX: 29.25 KG/M2 | SYSTOLIC BLOOD PRESSURE: 116 MMHG | HEART RATE: 54 BPM | HEIGHT: 66 IN

## 2019-05-16 DIAGNOSIS — I10 ESSENTIAL HYPERTENSION: ICD-10-CM

## 2019-05-16 DIAGNOSIS — E78.5 HYPERLIPIDEMIA LDL GOAL <100: Primary | Chronic | ICD-10-CM

## 2019-05-16 DIAGNOSIS — I25.10 CORONARY ARTERIOSCLEROSIS: Chronic | ICD-10-CM

## 2019-05-16 PROCEDURE — 99214 OFFICE O/P EST MOD 30 MIN: CPT | Performed by: INTERNAL MEDICINE

## 2019-05-16 RX ORDER — NEBIVOLOL 5 MG/1
10 TABLET ORAL DAILY
Qty: 90 TABLET | Refills: 3 | Status: SHIPPED | OUTPATIENT
Start: 2019-05-16 | End: 2019-05-20 | Stop reason: SDUPTHER

## 2019-05-20 DIAGNOSIS — I10 ESSENTIAL HYPERTENSION: ICD-10-CM

## 2019-05-20 RX ORDER — NEBIVOLOL 5 MG/1
5 TABLET ORAL DAILY
Qty: 90 TABLET | Refills: 3 | Status: SHIPPED | OUTPATIENT
Start: 2019-05-20 | End: 2020-04-29 | Stop reason: SDUPTHER

## 2019-06-13 ENCOUNTER — OFFICE VISIT (OUTPATIENT)
Dept: INTERNAL MEDICINE CLINIC | Facility: CLINIC | Age: 59
End: 2019-06-13
Payer: COMMERCIAL

## 2019-06-13 ENCOUNTER — APPOINTMENT (OUTPATIENT)
Dept: LAB | Facility: CLINIC | Age: 59
End: 2019-06-13
Payer: COMMERCIAL

## 2019-06-13 VITALS
DIASTOLIC BLOOD PRESSURE: 84 MMHG | HEART RATE: 68 BPM | OXYGEN SATURATION: 97 % | BODY MASS INDEX: 30.59 KG/M2 | SYSTOLIC BLOOD PRESSURE: 118 MMHG | HEIGHT: 64 IN | WEIGHT: 179.2 LBS

## 2019-06-13 DIAGNOSIS — Z11.59 NEED FOR HEPATITIS C SCREENING TEST: ICD-10-CM

## 2019-06-13 DIAGNOSIS — I10 ESSENTIAL HYPERTENSION: Chronic | ICD-10-CM

## 2019-06-13 DIAGNOSIS — Z12.5 SCREENING FOR PROSTATE CANCER: ICD-10-CM

## 2019-06-13 DIAGNOSIS — E78.5 HYPERLIPIDEMIA LDL GOAL <100: Chronic | ICD-10-CM

## 2019-06-13 DIAGNOSIS — Z00.00 ADULT GENERAL MEDICAL EXAMINATION: Primary | ICD-10-CM

## 2019-06-13 LAB
ANION GAP SERPL CALCULATED.3IONS-SCNC: 6 MMOL/L (ref 4–13)
BUN SERPL-MCNC: 10 MG/DL (ref 5–25)
CALCIUM SERPL-MCNC: 9.3 MG/DL (ref 8.3–10.1)
CHLORIDE SERPL-SCNC: 106 MMOL/L (ref 100–108)
CHOLEST SERPL-MCNC: 144 MG/DL (ref 50–200)
CO2 SERPL-SCNC: 29 MMOL/L (ref 21–32)
CREAT SERPL-MCNC: 0.9 MG/DL (ref 0.6–1.3)
ERYTHROCYTE [DISTWIDTH] IN BLOOD BY AUTOMATED COUNT: 14 % (ref 11.6–15.1)
GFR SERPL CREATININE-BSD FRML MDRD: 94 ML/MIN/1.73SQ M
GLUCOSE SERPL-MCNC: 84 MG/DL (ref 65–140)
HCT VFR BLD AUTO: 48.2 % (ref 36.5–49.3)
HDLC SERPL-MCNC: 67 MG/DL (ref 40–60)
HGB BLD-MCNC: 16 G/DL (ref 12–17)
LDLC SERPL CALC-MCNC: 61 MG/DL (ref 0–100)
MCH RBC QN AUTO: 30.5 PG (ref 26.8–34.3)
MCHC RBC AUTO-ENTMCNC: 33.2 G/DL (ref 31.4–37.4)
MCV RBC AUTO: 92 FL (ref 82–98)
NONHDLC SERPL-MCNC: 77 MG/DL
PLATELET # BLD AUTO: 302 THOUSANDS/UL (ref 149–390)
PMV BLD AUTO: 11 FL (ref 8.9–12.7)
POTASSIUM SERPL-SCNC: 4.1 MMOL/L (ref 3.5–5.3)
PSA SERPL-MCNC: 0.5 NG/ML (ref 0–4)
RBC # BLD AUTO: 5.24 MILLION/UL (ref 3.88–5.62)
SODIUM SERPL-SCNC: 141 MMOL/L (ref 136–145)
TRIGL SERPL-MCNC: 79 MG/DL
WBC # BLD AUTO: 6.72 THOUSAND/UL (ref 4.31–10.16)

## 2019-06-13 PROCEDURE — 36415 COLL VENOUS BLD VENIPUNCTURE: CPT

## 2019-06-13 PROCEDURE — 85027 COMPLETE CBC AUTOMATED: CPT

## 2019-06-13 PROCEDURE — 86803 HEPATITIS C AB TEST: CPT

## 2019-06-13 PROCEDURE — 80061 LIPID PANEL: CPT

## 2019-06-13 PROCEDURE — G0103 PSA SCREENING: HCPCS

## 2019-06-13 PROCEDURE — 99396 PREV VISIT EST AGE 40-64: CPT | Performed by: INTERNAL MEDICINE

## 2019-06-13 PROCEDURE — 80048 BASIC METABOLIC PNL TOTAL CA: CPT

## 2019-06-14 ENCOUNTER — TELEPHONE (OUTPATIENT)
Dept: INTERNAL MEDICINE CLINIC | Facility: CLINIC | Age: 59
End: 2019-06-14

## 2019-06-14 LAB — HCV AB SER QL: NORMAL

## 2019-09-30 DIAGNOSIS — E78.5 HYPERLIPIDEMIA, UNSPECIFIED HYPERLIPIDEMIA TYPE: ICD-10-CM

## 2019-10-02 RX ORDER — ROSUVASTATIN CALCIUM 20 MG/1
TABLET, COATED ORAL
Qty: 90 TABLET | Refills: 4 | Status: SHIPPED | OUTPATIENT
Start: 2019-10-02 | End: 2020-12-29 | Stop reason: SDUPTHER

## 2020-01-22 ENCOUNTER — OFFICE VISIT (OUTPATIENT)
Dept: INTERNAL MEDICINE CLINIC | Facility: CLINIC | Age: 60
End: 2020-01-22
Payer: COMMERCIAL

## 2020-01-22 VITALS
OXYGEN SATURATION: 97 % | SYSTOLIC BLOOD PRESSURE: 110 MMHG | DIASTOLIC BLOOD PRESSURE: 76 MMHG | HEART RATE: 82 BPM | BODY MASS INDEX: 32.37 KG/M2 | HEIGHT: 64 IN | WEIGHT: 189.6 LBS

## 2020-01-22 DIAGNOSIS — I25.10 CORONARY ARTERIOSCLEROSIS IN NATIVE ARTERY: ICD-10-CM

## 2020-01-22 DIAGNOSIS — I10 ESSENTIAL HYPERTENSION: Primary | Chronic | ICD-10-CM

## 2020-01-22 DIAGNOSIS — L98.9 SKIN LESIONS: ICD-10-CM

## 2020-01-22 DIAGNOSIS — E66.9 OBESITY (BMI 30.0-34.9): Chronic | ICD-10-CM

## 2020-01-22 DIAGNOSIS — E78.5 HYPERLIPIDEMIA LDL GOAL <100: Chronic | ICD-10-CM

## 2020-01-22 PROCEDURE — 3008F BODY MASS INDEX DOCD: CPT | Performed by: INTERNAL MEDICINE

## 2020-01-22 PROCEDURE — 99214 OFFICE O/P EST MOD 30 MIN: CPT | Performed by: INTERNAL MEDICINE

## 2020-01-22 PROCEDURE — 3074F SYST BP LT 130 MM HG: CPT | Performed by: INTERNAL MEDICINE

## 2020-01-22 PROCEDURE — 3078F DIAST BP <80 MM HG: CPT | Performed by: INTERNAL MEDICINE

## 2020-01-22 NOTE — PROGRESS NOTES
INTERNAL MEDICINE OFFICE VISIT  [facility] [address]  Tel: [phone]      NAME: Alexander Essex  AGE: 63 y o  SEX: male  : 1960   MRN: [id number]    DATE: 2020  TIME: 4:17 PM      Assessment and Plan:  1  Essential hypertension   continue present medication  - Comprehensive metabolic panel; Future  - CBC and differential; Future  - TSH, 3rd generation; Future  - CBC and differential; Future  - Comprehensive metabolic panel; Future  - TSH, 3rd generation; Future    2  Hyperlipidemia LDL goal <100   continue Crestor  - Lipid panel; Future  - Lipid panel; Future    3  Coronary arteriosclerosis in native artery   follow-up with Cardiology    4  Obesity (BMI 30 0-34  9)  BMI Counseling: Body mass index is 32 54 kg/m²  The BMI is above normal  Nutrition recommendations include decreasing portion sizes, encouraging healthy choices of fruits and vegetables and moderation in carbohydrate intake  Exercise recommendations include moderate physical activity 150 minutes/week  No pharmacotherapy was ordered  5  Skin lesions    - Ambulatory referral to Dermatology; Future      - Counseling Documentation: patient was counseled regarding: diagnostic results, instructions for management, risk factor reductions, prognosis, patient and family education, risks and benefits of treatment options and importance of compliance with treatment  - Medication Side Effects: Adverse side effects of medications were reviewed with the patient/guardian today  Return for follow up visit in  6 months or earlier, if needed        Chief Complaint:  Chief Complaint   Patient presents with   [address]     Former Dr Alejandro Rangel pt         History of Present Illness:    this is Dr Alejandro Rangel  patient who wants to switch   hypertension- blood pressure stable on present medication  Hyperlipidemia - takes Crestor and lipid profile is stable Coronary artery disease- he had a recent catheterization which did not show any blockages  Obesity -he is trying to lose weight with the [facility] program           Active Problem List:  Patient Active Problem List   Diagnosis    Coronary arteriosclerosis in native artery    Gastroesophageal reflux disease without esophagitis    Hyperlipidemia LDL goal <100    Essential hypertension    Obesity (BMI 30 0-34  9)         Past Medical History:  Past Medical History:   Diagnosis Date    CAD (coronary artery disease) 2009    50% RCA stenosis on coronary angiogram     GERD (gastroesophageal reflux disease)     Hyperlipidemia     Hypertension     Obesity     Schatzki's ring 02/07/2014         Past Surgical History:  Past Surgical History:   Procedure Laterality Date    CARDIAC CATHETERIZATION  2009, 2018    COLONOSCOPY  10/13/2011    ESOPHAGOGASTRODUODENOSCOPY           Family History:  Family History   Problem Relation Age of Onset    Coronary artery disease Father     Heart attack Father 40    Arrhythmia Father     Cancer Maternal Aunt         GI         Social History:  Social History     Socioeconomic History    Marital status: /Civil Union     Spouse name: None    Number of children: None    Years of education: None    Highest education level: None   Occupational History    None   Social Needs    Financial resource strain: None    Food insecurity:     Worry: None     Inability: None    Transportation needs:     Medical: None     Non-medical: None   Tobacco Use    Smoking status: Never Smoker    Smokeless tobacco: Never Used   Substance and Sexual Activity    Alcohol use: Yes     Frequency: 2-3 times a week     Drinks per session: 1 or 2     Binge frequency: Never     Comment: 5 a week    Drug use: No    Sexual activity: Yes     Partners: Female   Lifestyle    Physical activity:     Days per week: 0 days     Minutes per session: 0 min    Stress: Very much Relationships    Social connections:     Talks on phone: None     Gets together: None     Attends Baptism service: None     Active member of club or organization: None     Attends meetings of clubs or organizations: None     Relationship status: None    Intimate partner violence:     Fear of current or ex partner: None     Emotionally abused: None     Physically abused: None     Forced sexual activity: None   Other Topics Concern    None   Social History Narrative    None         Allergies:   Allergies   Allergen Reactions    Dilaudid [Hydromorphone Hcl] Other (See Comments)     Pt states he had a bad experience, blood pressure dropped          Medications:    Current Outpatient Medications:     aspirin 81 MG tablet, Take by mouth daily , Disp: , Rfl:     Coenzyme Q10 (CO Q 10) 10 MG CAPS, Take by mouth daily , Disp: , Rfl:     nebivolol (BYSTOLIC) 5 mg tablet, Take 1 tablet (5 mg total) by mouth daily, Disp: 90 tablet, Rfl: 3    Omega-3 Fatty Acids ([employer] FISH OIL) 1200 MG CAPS, [employer] Fish Oil 1200 MG Oral Capsule; daily, Disp: , Rfl:     rosuvastatin (CRESTOR) 20 MG tablet, TAKE 1 TABLET DAILY, Disp: 90 tablet, Rfl: 4      The following portions of the patient's history were reviewed and updated as appropriate: past medical history, past surgical history, family history, social history, allergies, current medications and active problem list       Review of Systems:  Constitutional: Denies fever, chills, weight gain, weight loss, fatigue  Eyes: Denies eye redness, eye discharge, double vision, change in visual acuity  ENT: Denies hearing loss, tinnitus, sneezing, nasal congestion, nasal discharge, sore throat   Respiratory: Denies cough, expectoration, hemoptysis, shortness of breath, wheezing  Cardiovascular: Denies chest pain, palpitations, lower extremity swelling, orthopnea, PND  Gastrointestinal: Denies abdominal pain, heartburn, nausea, vomiting, hematemesis, diarrhea, bloody stools  Genito-Urinary: Denies dysuria, frequency, difficulty in micturition, nocturia, incontinence  Musculoskeletal: Denies back pain, joint pain, muscle pain  Neurologic: Denies confusion, lightheadedness, syncope, headache, focal weakness, sensory changes, seizures  Endocrine: Denies polyuria, polydipsia, temperature intolerance  Allergy and Immunology: Denies hives, insect bite sensitivity  Hematological and Lymphatic: Denies bleeding problems, swollen glands   Psychological: Denies depression, suicidal ideation, anxiety, panic, mood swings  Dermatological: Denies pruritus, rash, skin lesion changes      Vitals:  Vitals:    01/22/20 1550   BP: 110/76   Pulse: 82   SpO2: 97%       Body mass index is 32 54 kg/m²  Weight (last 2 days)     Date/Time   Weight    01/22/20 1550   86 (189 6)                Physical Examination:  General: Patient is not in acute distress  Awake, alert, responding to commands  No weight gain or loss  Head: Normocephalic  Atraumatic  Eyes: Conjunctiva and lids with no swelling, erythema or discharge  Both pupils normal sized, round and reactive to light  Sclera nonicteric  ENT: External examination of nose and ear normal  Otoscopic examination shows translucent tympanic membranes with patent canals without erythema  Oropharynx moist with no erythema, edema, exudate or lesions  Neck: Supple  JVP not raised  Trachea midline  No masses  No thyromegaly  Lungs: No signs of increased work of breathing or respiratory distress  Bilateral bronchovascular breath sounds with no crackles or rhonchi  Chest wall: No tenderness  Cardiovascular: Normal PMI  No thrills  Regular rate and rhythm  S1 and S2 normal  No murmur, rub or gallop  Gastrointestinal: Abdomen soft, nontender  No guarding or rigidity  Liver and spleen not palpable  Bowel sounds present  Neurologic: Cranial nerves II-XII intact   Cortical functions normal  Motor system - Reflexes 2+ and symmetrical  Sensations normal  Musculoskeletal: Gait normal  No joint tenderness  Integumentary: Skin normal with no rash or lesions  Lymphatic: No palpable lymph nodes in neck, axilla or groin  Extremities: No clubbing, cyanosis, edema or varicosities  Psychological: Judgement and insight normal  Mood and affect normal      Laboratory Results:  CBC with diff:   Lab Results   Component Value Date    WBC 6 72 06/13/2019    WBC 8 4 08/07/2014    RBC 5 24 06/13/2019    RBC 4 97 08/07/2014    HGB 16 0 06/13/2019    HGB 14 5 08/07/2014    HCT 48 2 06/13/2019    HCT 43 8 08/07/2014    MCV 92 06/13/2019    MCV 88 08/07/2014    MCH 30 5 06/13/2019    MCH 29 3 08/07/2014    RDW 14 0 06/13/2019    RDW 13 2 08/07/2014     06/13/2019     08/07/2014       CMP:  Lab Results   Component Value Date    CREATININE 0 90 06/13/2019    CREATININE 0 8 08/07/2014    BUN 10 06/13/2019    BUN 12 08/07/2014     08/07/2014    K 4 1 06/13/2019    K 3 8 08/07/2014     06/13/2019     08/07/2014    CO2 29 06/13/2019    CO2 24 2 08/07/2014    GLUCOSE 78 08/07/2014    PROT 7 0 08/07/2014    ALKPHOS 53 03/14/2018    ALKPHOS 83 08/07/2014    ALT 33 03/14/2018    ALT 28 08/07/2014    AST 32 03/14/2018    AST 21 08/07/2014       No results found for: HGBA1C, MG, PHOS    No results found for: TROPONINI, CKMB, CKTOTAL    Lipid Profile:   Lab Results   Component Value Date    CHOL 138 08/07/2014     Lab Results   Component Value Date    HDL 67 (H) 06/13/2019    HDL 57 03/14/2018     Lab Results   Component Value Date    LDLCALC 61 06/13/2019    LDLCALC 47 03/14/2018     Lab Results   Component Value Date    TRIG 79 06/13/2019    TRIG 97 03/14/2018       Imaging Results:  MRI knee right  wo contrast  Narrative: MRI RIGHT KNEE    INDICATION:   S82 141A: Displaced bicondylar fracture of right tibia, initial encounter for closed fracture  M25 561: Pain in right knee  COMPARISON: Plain film dated 5/20/2018  CT dated 5/2/2018      TECHNIQUE:    MR sequences were obtained of the right knee including:  Localizer, axial T2 fat sat, coronal T1/T2 fat sat, sagittal PD/T2 fat sat  Images were acquired on a 1 5 Radha unit  Gadolinium was not used  FINDINGS:    SUBCUTANEOUS TISSUES: Normal    JOINT EFFUSION: None  BAKER'S CYST: Tiny lobulated Baker's cyst     MENISCI: Intact  CRUCIATE LIGAMENTS: Intact  EXTENSOR APPARATUS: Intact  COLLATERAL LIGAMENTS: Intact  ARTICULAR SURFACES: Degenerative change noted with grade 3 cartilage fissuring at the lateral patellar facet  There is also grade 3 cartilage loss at the mid trochlea  BONES: Slightly depressed lateral tibial plateau fracture with reactive marrow edema corresponding to CT findings  MUSCULATURE:  Within the medial gastrocnemius muscle there is a heterogeneous collection measuring up to 1 9 x 0 9 x 5 5 cm consistent with intramuscular hematoma  Impression: 1  Slightly depressed lateral tibial plateau fracture with reactive marrow edema  2   Medial gastrocnemius intramuscular hematoma  3   Undersurface posterior horn medial meniscus tear without evidence of flipped fragment  4   Degenerative change with grade 3 cartilage loss across the patellofemoral compartment      Workstation performed: [id number]       Health Maintenance:  Health Maintenance   Topic Date Due    DTaP,Tdap,and Td Vaccines (1 - Tdap) 08/29/1972    Influenza Vaccine  07/01/2019    Depression Screening PHQ  06/13/2020    BMI: Followup Plan  06/13/2020    BMI: Adult  06/13/2020    Annual Physical  06/13/2020    CRC Screening: Colonoscopy  10/13/2021    Pneumococcal Vaccine: 65+ Years (1 of 2 - PCV13) 08/29/2026    Hepatitis C Screening  Completed    Pneumococcal Vaccine: Pediatrics (0 to 5 Years) and At-Risk Patients (6 to 59 Years)  Aged Out    HIB Vaccine  Aged Out    Hepatitis B Vaccine  Aged Out    IPV Vaccine  Aged Out    Hepatitis A Vaccine  Aged Out    Meningococcal ACWY Vaccine  Aged Out    HPV Vaccine  Aged Out    HIV Screening Discontinued     Immunization History   Administered Date(s) Administered    INFLUENZA 12/16/2008, 09/17/2018    Influenza TIV (IM) 10/01/2017    Influenza, injectable, quadrivalent, preservative free 0 5 mL 09/17/2018    Pneumococcal Polysaccharide PPV23 12/16/2008         Edi Aleman MD  1/22/2020,4:17 PM

## 2020-02-01 ENCOUNTER — LAB (OUTPATIENT)
Dept: LAB | Facility: CLINIC | Age: 60
End: 2020-02-01
Payer: COMMERCIAL

## 2020-02-01 DIAGNOSIS — I10 ESSENTIAL HYPERTENSION: Chronic | ICD-10-CM

## 2020-02-01 DIAGNOSIS — E78.5 HYPERLIPIDEMIA LDL GOAL <100: Chronic | ICD-10-CM

## 2020-02-01 LAB
ALBUMIN SERPL BCP-MCNC: 4 G/DL (ref 3.5–5)
ALP SERPL-CCNC: 49 U/L (ref 46–116)
ALT SERPL W P-5'-P-CCNC: 38 U/L (ref 12–78)
ANION GAP SERPL CALCULATED.3IONS-SCNC: 5 MMOL/L (ref 4–13)
AST SERPL W P-5'-P-CCNC: 18 U/L (ref 5–45)
BASOPHILS # BLD AUTO: 0.05 THOUSANDS/ΜL (ref 0–0.1)
BASOPHILS NFR BLD AUTO: 1 % (ref 0–1)
BILIRUB SERPL-MCNC: 1.53 MG/DL (ref 0.2–1)
BUN SERPL-MCNC: 11 MG/DL (ref 5–25)
CALCIUM SERPL-MCNC: 9 MG/DL (ref 8.3–10.1)
CHLORIDE SERPL-SCNC: 108 MMOL/L (ref 100–108)
CHOLEST SERPL-MCNC: 134 MG/DL (ref 50–200)
CO2 SERPL-SCNC: 27 MMOL/L (ref 21–32)
CREAT SERPL-MCNC: 0.91 MG/DL (ref 0.6–1.3)
EOSINOPHIL # BLD AUTO: 0.2 THOUSAND/ΜL (ref 0–0.61)
EOSINOPHIL NFR BLD AUTO: 4 % (ref 0–6)
ERYTHROCYTE [DISTWIDTH] IN BLOOD BY AUTOMATED COUNT: 13.2 % (ref 11.6–15.1)
GFR SERPL CREATININE-BSD FRML MDRD: 93 ML/MIN/1.73SQ M
GLUCOSE P FAST SERPL-MCNC: 76 MG/DL (ref 65–99)
HCT VFR BLD AUTO: 48.9 % (ref 36.5–49.3)
HDLC SERPL-MCNC: 59 MG/DL
HGB BLD-MCNC: 15.9 G/DL (ref 12–17)
IMM GRANULOCYTES # BLD AUTO: 0 THOUSAND/UL (ref 0–0.2)
IMM GRANULOCYTES NFR BLD AUTO: 0 % (ref 0–2)
LDLC SERPL CALC-MCNC: 63 MG/DL (ref 0–100)
LYMPHOCYTES # BLD AUTO: 1.29 THOUSANDS/ΜL (ref 0.6–4.47)
LYMPHOCYTES NFR BLD AUTO: 28 % (ref 14–44)
MCH RBC QN AUTO: 29.7 PG (ref 26.8–34.3)
MCHC RBC AUTO-ENTMCNC: 32.5 G/DL (ref 31.4–37.4)
MCV RBC AUTO: 91 FL (ref 82–98)
MONOCYTES # BLD AUTO: 0.53 THOUSAND/ΜL (ref 0.17–1.22)
MONOCYTES NFR BLD AUTO: 12 % (ref 4–12)
NEUTROPHILS # BLD AUTO: 2.54 THOUSANDS/ΜL (ref 1.85–7.62)
NEUTS SEG NFR BLD AUTO: 55 % (ref 43–75)
NONHDLC SERPL-MCNC: 75 MG/DL
NRBC BLD AUTO-RTO: 0 /100 WBCS
PLATELET # BLD AUTO: 281 THOUSANDS/UL (ref 149–390)
PMV BLD AUTO: 10.6 FL (ref 8.9–12.7)
POTASSIUM SERPL-SCNC: 4.1 MMOL/L (ref 3.5–5.3)
PROT SERPL-MCNC: 7.3 G/DL (ref 6.4–8.2)
RBC # BLD AUTO: 5.36 MILLION/UL (ref 3.88–5.62)
SODIUM SERPL-SCNC: 140 MMOL/L (ref 136–145)
TRIGL SERPL-MCNC: 59 MG/DL
TSH SERPL DL<=0.05 MIU/L-ACNC: 3.95 UIU/ML (ref 0.36–3.74)
WBC # BLD AUTO: 4.61 THOUSAND/UL (ref 4.31–10.16)

## 2020-02-01 PROCEDURE — 85025 COMPLETE CBC W/AUTO DIFF WBC: CPT

## 2020-02-01 PROCEDURE — 80053 COMPREHEN METABOLIC PANEL: CPT

## 2020-02-01 PROCEDURE — 36415 COLL VENOUS BLD VENIPUNCTURE: CPT

## 2020-02-01 PROCEDURE — 84443 ASSAY THYROID STIM HORMONE: CPT

## 2020-02-01 PROCEDURE — 80061 LIPID PANEL: CPT

## 2020-02-03 ENCOUNTER — TELEPHONE (OUTPATIENT)
Dept: INTERNAL MEDICINE CLINIC | Facility: CLINIC | Age: 60
End: 2020-02-03

## 2020-02-03 NOTE — TELEPHONE ENCOUNTER
----- Message from Calvin Anaya MD sent at 2/3/2020  6:00 PM EST -----  Labs ok, please call and inform patient

## 2020-02-13 ENCOUNTER — TELEPHONE (OUTPATIENT)
Dept: INTERNAL MEDICINE CLINIC | Facility: CLINIC | Age: 60
End: 2020-02-13

## 2020-02-13 ENCOUNTER — OFFICE VISIT (OUTPATIENT)
Dept: INTERNAL MEDICINE CLINIC | Facility: CLINIC | Age: 60
End: 2020-02-13
Payer: COMMERCIAL

## 2020-02-13 VITALS
TEMPERATURE: 98.6 F | WEIGHT: 185.6 LBS | DIASTOLIC BLOOD PRESSURE: 80 MMHG | HEART RATE: 68 BPM | SYSTOLIC BLOOD PRESSURE: 106 MMHG | HEIGHT: 64 IN | OXYGEN SATURATION: 96 % | BODY MASS INDEX: 31.69 KG/M2

## 2020-02-13 DIAGNOSIS — J20.9 ACUTE BRONCHITIS, UNSPECIFIED ORGANISM: Primary | ICD-10-CM

## 2020-02-13 DIAGNOSIS — J20.9 ACUTE BRONCHITIS, UNSPECIFIED ORGANISM: ICD-10-CM

## 2020-02-13 PROCEDURE — 3074F SYST BP LT 130 MM HG: CPT | Performed by: INTERNAL MEDICINE

## 2020-02-13 PROCEDURE — 3008F BODY MASS INDEX DOCD: CPT | Performed by: INTERNAL MEDICINE

## 2020-02-13 PROCEDURE — 3079F DIAST BP 80-89 MM HG: CPT | Performed by: INTERNAL MEDICINE

## 2020-02-13 PROCEDURE — 1036F TOBACCO NON-USER: CPT | Performed by: INTERNAL MEDICINE

## 2020-02-13 PROCEDURE — 99213 OFFICE O/P EST LOW 20 MIN: CPT | Performed by: INTERNAL MEDICINE

## 2020-02-13 RX ORDER — BENZONATATE 100 MG/1
100 CAPSULE ORAL 3 TIMES DAILY PRN
Qty: 30 CAPSULE | Refills: 0 | Status: SHIPPED | OUTPATIENT
Start: 2020-02-13 | End: 2020-08-17

## 2020-02-13 RX ORDER — BENZONATATE 100 MG/1
100 CAPSULE ORAL 3 TIMES DAILY PRN
Qty: 30 CAPSULE | Refills: 0 | Status: SHIPPED | OUTPATIENT
Start: 2020-02-13 | End: 2020-02-13 | Stop reason: SDUPTHER

## 2020-02-13 RX ORDER — AZITHROMYCIN 250 MG/1
TABLET, FILM COATED ORAL
Qty: 6 TABLET | Refills: 0 | Status: SHIPPED | OUTPATIENT
Start: 2020-02-13 | End: 2020-02-13 | Stop reason: SDUPTHER

## 2020-02-13 RX ORDER — AZITHROMYCIN 250 MG/1
TABLET, FILM COATED ORAL
Qty: 6 TABLET | Refills: 0 | Status: SHIPPED | OUTPATIENT
Start: 2020-02-13 | End: 2020-02-18

## 2020-02-13 NOTE — PROGRESS NOTES
INTERNAL MEDICINE FOLLOW-UP OFFICE VISIT  [facility]    NAME: Lydia Garcia  AGE: 63 y o  SEX: male  : 1960   MRN: [id number]    DATE: 2020  TIME: 12:06 PM    Assessment and Plan     Diagnoses and all orders for this visit:    Acute bronchitis, unspecified organism  -     azithromycin (ZITHROMAX) 250 mg tablet; Take 2 tablets today then 1 tablet daily x 4 days  -     benzonatate (TESSALON PERLES) 100 mg capsule; Take 1 capsule (100 mg total) by mouth 3 (three) times a day as needed for cough     he was advised to take the Flonase nasal spray along with the above medications and take Tylenol as needed for fever and body ache  - Counseling Documentation: patient was counseled regarding: instructions for management, risk factor reductions, prognosis, patient and family education, risks and benefits of treatment options and importance of compliance with treatment  - Medication Side Effects: Adverse side effects of medications were reviewed with the patient/guardian today  Return to office in: as needed    Chief Complaint     Chief Complaint   Patient presents with    Cough    Cold Like Symptoms    Headache       History of Present Illness     Cough   This is a new problem  The current episode started in the past 7 days  The problem has been unchanged  The problem occurs every few minutes  The cough is productive of sputum  Associated symptoms include chills, headaches, nasal congestion, postnasal drip, rhinorrhea and a sore throat  Pertinent negatives include no chest pain, ear pain, eye redness, fever, myalgias, rash, shortness of breath or wheezing  Nothing aggravates the symptoms  He has tried OTC cough suppressant for the symptoms  The treatment provided mild relief         The following portions of the patient's history were reviewed and updated as appropriate: allergies, current medications, past family history, past medical history, past social history, past surgical history and problem list     Review of Systems     Review of Systems   Constitutional: Positive for chills  Negative for diaphoresis, fatigue and fever  HENT: Positive for postnasal drip, rhinorrhea and sore throat  Negative for congestion, ear discharge, ear pain, hearing loss, sinus pressure, sinus pain, sneezing and voice change  Eyes: Negative for pain, discharge, redness and visual disturbance  Respiratory: Positive for cough  Negative for chest tightness, shortness of breath and wheezing  Cardiovascular: Negative for chest pain, palpitations and leg swelling  Gastrointestinal: Negative for abdominal distention, abdominal pain, blood in stool, constipation, diarrhea, nausea and vomiting  Endocrine: Negative for cold intolerance, heat intolerance, polydipsia, polyphagia and polyuria  Genitourinary: Negative for dysuria, flank pain, frequency, hematuria and urgency  Musculoskeletal: Negative for arthralgias, back pain, gait problem, joint swelling, myalgias, neck pain and neck stiffness  Skin: Negative for rash  Neurological: Positive for headaches  Negative for dizziness, tremors, syncope, facial asymmetry, speech difficulty, weakness, light-headedness and numbness  Hematological: Does not bruise/bleed easily  Psychiatric/Behavioral: Negative for behavioral problems, confusion and sleep disturbance  The patient is not nervous/anxious  Active Problem List     Patient Active Problem List   Diagnosis    Coronary arteriosclerosis in native artery    Gastroesophageal reflux disease without esophagitis    Hyperlipidemia LDL goal <100    Essential hypertension    Obesity (BMI 30 0-34 9)       Objective     /80   Pulse 68   Temp 98 6 °F (37 °C)   Ht 5' 4" (1 626 m)   Wt 84 2 kg (185 lb 9 6 oz)   SpO2 96%   BMI 31 86 kg/m²     Physical Exam   Constitutional: He is oriented to person, place, and time  He appears well-developed and well-nourished  No distress  HENT:   Head: Normocephalic and atraumatic  Right Ear: External ear normal    Left Ear: External ear normal    Nose: Nose normal     Throat is congested   Eyes: Conjunctivae and EOM are normal  Right eye exhibits no discharge  Left eye exhibits no discharge  No scleral icterus  Neck: Normal range of motion  Neck supple  No JVD present  No tracheal deviation present  No thyromegaly present  Cardiovascular: Normal rate, regular rhythm, normal heart sounds and intact distal pulses  Exam reveals no gallop and no friction rub  No murmur heard  Pulmonary/Chest: Effort normal and breath sounds normal  No respiratory distress  He has no wheezes  He has no rales  He exhibits no tenderness  Abdominal: Soft  Bowel sounds are normal  He exhibits no distension  There is no tenderness  There is no rebound and no guarding  Musculoskeletal: Normal range of motion  He exhibits no edema or tenderness  Lymphadenopathy:     He has no cervical adenopathy  Neurological: He is alert and oriented to person, place, and time  No cranial nerve deficit  He exhibits normal muscle tone  Coordination normal    Skin: Skin is warm and dry  No rash noted  He is not diaphoretic  No erythema  Psychiatric: He has a normal mood and affect   Judgment normal            Current Medications       Current Outpatient Medications:     aspirin 81 MG tablet, Take by mouth daily , Disp: , Rfl:     Coenzyme Q10 (CO Q 10) 10 MG CAPS, Take by mouth daily , Disp: , Rfl:     nebivolol (BYSTOLIC) 5 mg tablet, Take 1 tablet (5 mg total) by mouth daily, Disp: 90 tablet, Rfl: 3    Omega-3 Fatty Acids ([facility] FISH OIL) 1200 MG CAPS, [facility]S Fish Oil 1200 MG Oral Capsule; daily, Disp: , Rfl:     rosuvastatin (CRESTOR) 20 MG tablet, TAKE 1 TABLET DAILY, Disp: 90 tablet, Rfl: 4    azithromycin (ZITHROMAX) 250 mg tablet, Take 2 tablets today then 1 tablet daily x 4 days, Disp: 6 tablet, Rfl: 0    benzonatate (TESSALON PERLES) 100 mg capsule, Take 1 capsule (100 mg total) by mouth 3 (three) times a day as needed for cough, Disp: 30 capsule, Rfl: 0    Health Maintenance     Health Maintenance   Topic Date Due    DTaP,Tdap,and Td Vaccines (1 - Tdap) 08/29/1972    Annual Physical  06/13/2020    BMI: Followup Plan  01/22/2021    Depression Screening PHQ  02/13/2021    BMI: Adult  02/13/2021    CRC Screening: Colonoscopy  10/13/2021    Pneumococcal Vaccine: 65+ Years (1 of 2 - PCV13) 08/29/2026    Hepatitis C Screening  Completed    Influenza Vaccine  Completed    Pneumococcal Vaccine: Pediatrics (0 to 5 Years) and At-Risk Patients (6 to 59 Years)  Aged Out    HIB Vaccine  Aged Out    Hepatitis B Vaccine  Aged Out    IPV Vaccine  Aged Out    Hepatitis A Vaccine  Aged Out    Meningococcal ACWY Vaccine  Aged Out    HPV Vaccine  Aged Out    HIV Screening  Discontinued     Immunization History   Administered Date(s) Administered    INFLUENZA 12/16/2008, 09/17/2018    Influenza TIV (IM) 10/01/2017    Influenza, injectable, quadrivalent, preservative free 0 5 mL 09/17/2018, 09/23/2019    Pneumococcal Polysaccharide PPV23 12/16/2008         Talisha Abarca MD  [address] [facility] of BEHAVIORAL MEDICINE AT [facility]

## 2020-02-13 NOTE — TELEPHONE ENCOUNTER
SAW  BHARATHI  TODAY  SAID   HIS  RX  WASN[facility]T S[facility] INTO  [facility]    WANTS  TO KNOW  IF  IT  CAN  BE  RE  SENT   FOR  ANTIBODIC   AND  COUGH  RX

## 2020-04-17 ENCOUNTER — TELEMEDICINE (OUTPATIENT)
Dept: DERMATOLOGY | Facility: CLINIC | Age: 60
End: 2020-04-17
Payer: COMMERCIAL

## 2020-04-17 DIAGNOSIS — L82.1 SEBORRHEIC KERATOSIS: Primary | ICD-10-CM

## 2020-04-17 PROCEDURE — 99241 PR OFFICE CONSULTATION NEW/ESTAB PATIENT 15 MIN: CPT | Performed by: DERMATOLOGY

## 2020-04-17 RX ORDER — DIPHENOXYLATE HYDROCHLORIDE AND ATROPINE SULFATE 2.5; .025 MG/1; MG/1
1 TABLET ORAL DAILY
COMMUNITY

## 2020-04-27 DIAGNOSIS — I10 ESSENTIAL HYPERTENSION: ICD-10-CM

## 2020-04-29 RX ORDER — NEBIVOLOL HYDROCHLORIDE 5 MG/1
TABLET ORAL
Qty: 90 TABLET | Refills: 3 | Status: CANCELLED | OUTPATIENT
Start: 2020-04-29

## 2020-05-27 ENCOUNTER — OFFICE VISIT (OUTPATIENT)
Dept: CARDIOLOGY CLINIC | Facility: CLINIC | Age: 60
End: 2020-05-27
Payer: COMMERCIAL

## 2020-05-27 VITALS
RESPIRATION RATE: 18 BRPM | BODY MASS INDEX: 31.24 KG/M2 | DIASTOLIC BLOOD PRESSURE: 80 MMHG | HEART RATE: 70 BPM | OXYGEN SATURATION: 97 % | SYSTOLIC BLOOD PRESSURE: 118 MMHG | HEIGHT: 64 IN | WEIGHT: 183 LBS

## 2020-05-27 DIAGNOSIS — I10 ESSENTIAL HYPERTENSION: Chronic | ICD-10-CM

## 2020-05-27 DIAGNOSIS — Z76.89 ENCOUNTER TO ESTABLISH CARE: Primary | ICD-10-CM

## 2020-05-27 DIAGNOSIS — E78.5 HYPERLIPIDEMIA LDL GOAL <100: ICD-10-CM

## 2020-05-27 DIAGNOSIS — I25.10 CORONARY ARTERIOSCLEROSIS IN NATIVE ARTERY: ICD-10-CM

## 2020-05-27 PROCEDURE — 3079F DIAST BP 80-89 MM HG: CPT | Performed by: INTERNAL MEDICINE

## 2020-05-27 PROCEDURE — 99214 OFFICE O/P EST MOD 30 MIN: CPT | Performed by: INTERNAL MEDICINE

## 2020-05-27 PROCEDURE — 3008F BODY MASS INDEX DOCD: CPT | Performed by: INTERNAL MEDICINE

## 2020-05-27 PROCEDURE — 93000 ELECTROCARDIOGRAM COMPLETE: CPT | Performed by: INTERNAL MEDICINE

## 2020-05-27 PROCEDURE — 3074F SYST BP LT 130 MM HG: CPT | Performed by: INTERNAL MEDICINE

## 2020-05-27 PROCEDURE — 1036F TOBACCO NON-USER: CPT | Performed by: INTERNAL MEDICINE

## 2020-05-27 RX ORDER — NEBIVOLOL 5 MG/1
5 TABLET ORAL DAILY
Qty: 90 TABLET | Refills: 3 | Status: SHIPPED | OUTPATIENT
Start: 2020-05-27 | End: 2020-12-23 | Stop reason: SDUPTHER

## 2020-07-20 ENCOUNTER — TELEPHONE (OUTPATIENT)
Dept: DERMATOLOGY | Facility: CLINIC | Age: 60
End: 2020-07-20

## 2020-07-21 ENCOUNTER — OFFICE VISIT (OUTPATIENT)
Dept: DERMATOLOGY | Facility: CLINIC | Age: 60
End: 2020-07-21
Payer: COMMERCIAL

## 2020-07-21 VITALS — TEMPERATURE: 98 F

## 2020-07-21 DIAGNOSIS — L82.1 SEBORRHEIC KERATOSIS: Primary | ICD-10-CM

## 2020-07-21 DIAGNOSIS — Z13.89 SCREENING FOR SKIN CONDITION: ICD-10-CM

## 2020-07-21 PROCEDURE — 3079F DIAST BP 80-89 MM HG: CPT | Performed by: DERMATOLOGY

## 2020-07-21 PROCEDURE — 3074F SYST BP LT 130 MM HG: CPT | Performed by: DERMATOLOGY

## 2020-07-21 PROCEDURE — 1036F TOBACCO NON-USER: CPT | Performed by: DERMATOLOGY

## 2020-07-21 PROCEDURE — 99213 OFFICE O/P EST LOW 20 MIN: CPT | Performed by: DERMATOLOGY

## 2020-07-21 NOTE — PROGRESS NOTES
[address] [facility] DERMATOLOGY  [address]  [place] [address]  [phone]  [phone]     MRN: [id number] : 1960  Encounter: [id number]  Patient Information: Gertrude Proctor  Chief complaint:  Skin cancer checkup    History of present illness:  60-year-old male seen virtual exam for lesion on his hand and presents now for overall checkup no specific concerns except for growths that appearing on his skin  Past Medical History:   Diagnosis Date    CAD (coronary artery disease)     50% RCA stenosis on coronary angiogram     GERD (gastroesophageal reflux disease)     Hyperlipidemia     Hypertension     Obesity     Schatzki's ring 2014     Past Surgical History:   Procedure Laterality Date    CARDIAC CATHETERIZATION  ,     COLONOSCOPY  10/13/2011    ESOPHAGOGASTRODUODENOSCOPY       Social History   Social History     Substance and Sexual Activity   Alcohol Use Yes    Frequency: 2-3 times a week    Drinks per session: 1 or 2    Binge frequency: Never    Comment: 5 a week     Social History     Substance and Sexual Activity   Drug Use No     Social History     Tobacco Use   Smoking Status Never Smoker   Smokeless Tobacco Never Used     Family History   Problem Relation Age of Onset    Coronary artery disease Father     Heart attack Father 40    Arrhythmia Father     Cancer Maternal Aunt         GI     Meds/Allergies   Allergies   Allergen Reactions    Dilaudid [Hydromorphone Hcl] Other (See Comments)     Pt states he had a bad experience, blood pressure dropped        Meds:  Prior to Admission medications    Medication Sig Start Date End Date Taking?  Authorizing Provider   aspirin 81 MG tablet Take by mouth daily    Yes Historical Provider, MD   Coenzyme Q10 (CO Q 10) 10 MG CAPS Take by mouth daily    Yes Historical Provider, MD   multivitamin (THERAGRAN) TABS Take 1 tablet by mouth daily   Yes Historical Provider, MD   nebivolol (BYSTOLIC) 5 mg tablet Take 1 tablet (5 mg total) by mouth daily 5/27/20  Yes John Quiles MD   Omega-3 Fatty Acids ([facility] FISH OIL) 1200 MG CAPS [facility] Fish Oil 1200 MG Oral Capsule; daily   Yes Historical Provider, MD   rosuvastatin (CRESTOR) 20 MG tablet TAKE 1 TABLET DAILY 10/2/19  Yes Flako Johnson PA-C   benzonatate (TESSALON PERLES) 100 mg capsule Take 1 capsule (100 mg total) by mouth 3 (three) times a day as needed for cough  Patient not taking: Reported on 4/17/2020 2/13/20   Spencer Mcleod MD       Subjective:     Review of Systems:    General: negative for - chills, fatigue, fever,  weight gain or weight loss  Psychological: negative for - anxiety, behavioral disorder, concentration difficulties, decreased libido, depression, irritability, memory difficulties, mood swings, sleep disturbances or suicidal ideation  ENT: negative for - hearing difficulties , nasal congestion, nasal discharge, oral lesions, sinus pain, sneezing, sore throat  Allergy and Immunology: negative for - hives, insect bite sensitivity,  Hematological and Lymphatic: negative for - bleeding problems, blood clots,bruising, swollen lymph nodes  Endocrine: negative for - hair pattern changes, hot flashes, malaise/lethargy, mood swings, palpitations, polydipsia/polyuria, skin changes, temperature intolerance or unexpected weight change  Respiratory: negative for - cough, hemoptysis, orthopnea, shortness of breath, or wheezing  Cardiovascular: negative for - chest pain, dyspnea on exertion, edema,  Gastrointestinal: negative for - abdominal pain, nausea/vomiting  Genito-Urinary: negative for - dysuria, incontinence, irregular/heavy menses or urinary frequency/urgency  Musculoskeletal: negative for - gait disturbance, joint pain, joint stiffness, joint swelling, muscle pain, muscular weakness  Dermatological:  As in HPI  Neurological: negative for confusion, dizziness, headaches, impaired coordination/balance, memory loss, numbness/tingling, seizures, speech problems, tremors or weakness       Objective:   Temp 98 °F (36 7 °C)     Physical Exam:    General Appearance:    Alert, cooperative, no distress   Head:    Normocephalic, without obvious abnormality, atraumatic           Skin:   A full skin exam was performed including scalp, head scalp, eyes, ears, nose, lips, neck, chest, axilla, abdomen, back, buttocks, bilateral upper extremities, bilateral lower extremities, hands, feet, fingers, toes, fingernails, and toenails normal keratotic papules greasy stuck on appearance nothing else remarkable noted on complete exam     Assessment:     1  Seborrheic keratosis     2  Screening for skin condition           Plan:   Seborrheic Keratosis  Patient reasurred these are normal growths we acquire with age no treatment needed  Screening for Dermatologic Disorders: Nothing else of concern noted on complete exam follow up in 1 year       Adriana Pettit MD  7/21/2020,10:30 AM    Portions of the record may have been created with voice recognition software   Occasional wrong word or "sound a like" substitutions may have occurred due to the inherent limitations of voice recognition software   Read the chart carefully and recognize, using context, where substitutions have occurred

## 2020-08-11 ENCOUNTER — APPOINTMENT (OUTPATIENT)
Dept: LAB | Facility: CLINIC | Age: 60
End: 2020-08-11
Payer: COMMERCIAL

## 2020-08-11 DIAGNOSIS — I10 ESSENTIAL HYPERTENSION: Chronic | ICD-10-CM

## 2020-08-11 DIAGNOSIS — E78.5 HYPERLIPIDEMIA LDL GOAL <100: Chronic | ICD-10-CM

## 2020-08-11 LAB
ALBUMIN SERPL BCP-MCNC: 4.1 G/DL (ref 3.5–5)
ALP SERPL-CCNC: 60 U/L (ref 46–116)
ALT SERPL W P-5'-P-CCNC: 43 U/L (ref 12–78)
ANION GAP SERPL CALCULATED.3IONS-SCNC: 6 MMOL/L (ref 4–13)
AST SERPL W P-5'-P-CCNC: 26 U/L (ref 5–45)
BASOPHILS # BLD AUTO: 0.04 THOUSANDS/ΜL (ref 0–0.1)
BASOPHILS NFR BLD AUTO: 1 % (ref 0–1)
BILIRUB SERPL-MCNC: 2.18 MG/DL (ref 0.2–1)
BUN SERPL-MCNC: 12 MG/DL (ref 5–25)
CALCIUM SERPL-MCNC: 9.1 MG/DL (ref 8.3–10.1)
CHLORIDE SERPL-SCNC: 109 MMOL/L (ref 100–108)
CHOLEST SERPL-MCNC: 126 MG/DL (ref 50–200)
CO2 SERPL-SCNC: 25 MMOL/L (ref 21–32)
CREAT SERPL-MCNC: 0.92 MG/DL (ref 0.6–1.3)
EOSINOPHIL # BLD AUTO: 0.12 THOUSAND/ΜL (ref 0–0.61)
EOSINOPHIL NFR BLD AUTO: 2 % (ref 0–6)
ERYTHROCYTE [DISTWIDTH] IN BLOOD BY AUTOMATED COUNT: 13.2 % (ref 11.6–15.1)
GFR SERPL CREATININE-BSD FRML MDRD: 91 ML/MIN/1.73SQ M
GLUCOSE P FAST SERPL-MCNC: 88 MG/DL (ref 65–99)
HCT VFR BLD AUTO: 47 % (ref 36.5–49.3)
HDLC SERPL-MCNC: 45 MG/DL
HGB BLD-MCNC: 15.8 G/DL (ref 12–17)
IMM GRANULOCYTES # BLD AUTO: 0.02 THOUSAND/UL (ref 0–0.2)
IMM GRANULOCYTES NFR BLD AUTO: 0 % (ref 0–2)
LDLC SERPL CALC-MCNC: 66 MG/DL (ref 0–100)
LYMPHOCYTES # BLD AUTO: 1.04 THOUSANDS/ΜL (ref 0.6–4.47)
LYMPHOCYTES NFR BLD AUTO: 17 % (ref 14–44)
MCH RBC QN AUTO: 30.4 PG (ref 26.8–34.3)
MCHC RBC AUTO-ENTMCNC: 33.6 G/DL (ref 31.4–37.4)
MCV RBC AUTO: 90 FL (ref 82–98)
MONOCYTES # BLD AUTO: 0.89 THOUSAND/ΜL (ref 0.17–1.22)
MONOCYTES NFR BLD AUTO: 14 % (ref 4–12)
NEUTROPHILS # BLD AUTO: 4.07 THOUSANDS/ΜL (ref 1.85–7.62)
NEUTS SEG NFR BLD AUTO: 66 % (ref 43–75)
NONHDLC SERPL-MCNC: 81 MG/DL
NRBC BLD AUTO-RTO: 0 /100 WBCS
PLATELET # BLD AUTO: 209 THOUSANDS/UL (ref 149–390)
PMV BLD AUTO: 10.6 FL (ref 8.9–12.7)
POTASSIUM SERPL-SCNC: 4.1 MMOL/L (ref 3.5–5.3)
PROT SERPL-MCNC: 7.7 G/DL (ref 6.4–8.2)
RBC # BLD AUTO: 5.2 MILLION/UL (ref 3.88–5.62)
SODIUM SERPL-SCNC: 140 MMOL/L (ref 136–145)
TRIGL SERPL-MCNC: 77 MG/DL
TSH SERPL DL<=0.05 MIU/L-ACNC: 3 UIU/ML (ref 0.36–3.74)
WBC # BLD AUTO: 6.18 THOUSAND/UL (ref 4.31–10.16)

## 2020-08-11 PROCEDURE — 84443 ASSAY THYROID STIM HORMONE: CPT

## 2020-08-11 PROCEDURE — 85025 COMPLETE CBC W/AUTO DIFF WBC: CPT

## 2020-08-11 PROCEDURE — 80053 COMPREHEN METABOLIC PANEL: CPT

## 2020-08-11 PROCEDURE — 36415 COLL VENOUS BLD VENIPUNCTURE: CPT

## 2020-08-11 PROCEDURE — 80061 LIPID PANEL: CPT

## 2020-08-17 ENCOUNTER — OFFICE VISIT (OUTPATIENT)
Dept: INTERNAL MEDICINE CLINIC | Facility: CLINIC | Age: 60
End: 2020-08-17
Payer: COMMERCIAL

## 2020-08-17 VITALS
HEART RATE: 74 BPM | TEMPERATURE: 98.4 F | WEIGHT: 178.4 LBS | DIASTOLIC BLOOD PRESSURE: 60 MMHG | HEIGHT: 64 IN | SYSTOLIC BLOOD PRESSURE: 112 MMHG | OXYGEN SATURATION: 98 % | BODY MASS INDEX: 30.46 KG/M2

## 2020-08-17 DIAGNOSIS — E78.5 HYPERLIPIDEMIA LDL GOAL <100: Chronic | ICD-10-CM

## 2020-08-17 DIAGNOSIS — I25.10 CORONARY ARTERIOSCLEROSIS IN NATIVE ARTERY: ICD-10-CM

## 2020-08-17 DIAGNOSIS — I10 ESSENTIAL HYPERTENSION: Primary | Chronic | ICD-10-CM

## 2020-08-17 PROCEDURE — 3078F DIAST BP <80 MM HG: CPT | Performed by: INTERNAL MEDICINE

## 2020-08-17 PROCEDURE — 99214 OFFICE O/P EST MOD 30 MIN: CPT | Performed by: INTERNAL MEDICINE

## 2020-08-17 PROCEDURE — 1036F TOBACCO NON-USER: CPT | Performed by: INTERNAL MEDICINE

## 2020-08-17 PROCEDURE — 3008F BODY MASS INDEX DOCD: CPT | Performed by: INTERNAL MEDICINE

## 2020-08-17 PROCEDURE — 3074F SYST BP LT 130 MM HG: CPT | Performed by: INTERNAL MEDICINE

## 2020-08-17 NOTE — PROGRESS NOTES
INTERNAL MEDICINE OFFICE VISIT  [facility] of BEHAVIORAL MEDICINE AT [address]  Tel: [phone]      NAME: Juanjo Lo  AGE: 63 y o  SEX: male  : 1960   MRN: [id number]    DATE: 2020  TIME: 3:19 PM      Assessment and Plan:  1  Essential hypertension   continue present medication    2  Hyperlipidemia LDL goal <100   continue Crestor  - Comprehensive metabolic panel; Future  - CBC and differential; Future  - Lipid panel; Future  - TSH, 3rd generation; Future    3  Coronary arteriosclerosis in native artery   follow-up with Cardiology      - Counseling Documentation: patient was counseled regarding: diagnostic results, instructions for management, risk factor reductions, prognosis, patient and family education, risks and benefits of treatment options and importance of compliance with treatment  - Medication Side Effects: Adverse side effects of medications were reviewed with the patient/guardian today  Return for follow up visit in  6 months or earlier, if needed  Chief Complaint:  Chief Complaint   Patient presents with    Follow-up     6 month f/u with labs          History of Present Illness:    hypertension- blood pressure stable on present medication  Hyperlipidemia -takes Crestor regularly and lipid profile is within normal limits  Coronary artery disease-follows up with cardiology regularly      Active Problem List:  Patient Active Problem List   Diagnosis    Coronary arteriosclerosis in native artery    Gastroesophageal reflux disease without esophagitis    Hyperlipidemia LDL goal <100    Essential hypertension    Obesity (BMI 30 0-34  9)         Past Medical History:  Past Medical History:   Diagnosis Date    CAD (coronary artery disease)     50% RCA stenosis on coronary angiogram     GERD (gastroesophageal reflux disease)     Hyperlipidemia     Hypertension     Obesity     Schatzki's ring 2014         Past Surgical History:  Past Surgical History:   Procedure Laterality Date    CARDIAC CATHETERIZATION  2009, 2018    COLONOSCOPY  10/13/2011    ESOPHAGOGASTRODUODENOSCOPY           Family History:  Family History   Problem Relation Age of Onset    Coronary artery disease Father     Heart attack Father 40    Arrhythmia Father     Cancer Maternal Aunt         GI         Social History:  Social History     Socioeconomic History    Marital status: /Civil Union     Spouse name: None    Number of children: None    Years of education: None    Highest education level: None   Occupational History    None   Social Needs    Financial resource strain: None    Food insecurity     Worry: None     Inability: None    Transportation needs     Medical: None     Non-medical: None   Tobacco Use    Smoking status: Never Smoker    Smokeless tobacco: Never Used   Substance and Sexual Activity    Alcohol use: Yes     Frequency: 2-3 times a week     Drinks per session: 1 or 2     Binge frequency: Never     Comment: 5 a week    Drug use: No    Sexual activity: Yes     Partners: Female   Lifestyle    Physical activity     Days per week: 0 days     Minutes per session: 0 min    Stress: Not at all   Relationships    Social connections     Talks on phone: None     Gets together: None     Attends Sikh service: None     Active member of club or organization: None     Attends meetings of clubs or organizations: None     Relationship status: None    Intimate partner violence     Fear of current or ex partner: None     Emotionally abused: None     Physically abused: None     Forced sexual activity: None   Other Topics Concern    None   Social History Narrative    None         Allergies:   Allergies   Allergen Reactions    Dilaudid [Hydromorphone Hcl] Other (See Comments)     Pt states he had a bad experience, blood pressure dropped          Medications:    Current Outpatient Medications:     aspirin 81 MG tablet, Take by mouth daily , Disp: , Rfl:     Coenzyme Q10 (CO Q 10) 10 MG CAPS, Take by mouth daily , Disp: , Rfl:     multivitamin (THERAGRAN) TABS, Take 1 tablet by mouth daily, Disp: , Rfl:     nebivolol (BYSTOLIC) 5 mg tablet, Take 1 tablet (5 mg total) by mouth daily, Disp: 90 tablet, Rfl: 3    Omega-3 Fatty Acids (CVS FISH OIL) 1200 MG CAPS, [facility]S Fish Oil 1200 MG Oral Capsule; daily, Disp: , Rfl:     rosuvastatin (CRESTOR) 20 MG tablet, TAKE 1 TABLET DAILY, Disp: 90 tablet, Rfl: 4      The following portions of the patient's history were reviewed and updated as appropriate: past medical history, past surgical history, family history, social history, allergies, current medications and active problem list       Review of Systems:  Constitutional: Denies fever, chills, weight gain, weight loss, fatigue  Eyes: Denies eye redness, eye discharge, double vision, change in visual acuity  ENT: Denies hearing loss, tinnitus, sneezing, nasal congestion, nasal discharge, sore throat   Respiratory: Denies cough, expectoration, hemoptysis, shortness of breath, wheezing  Cardiovascular: Denies chest pain, palpitations, lower extremity swelling, orthopnea, PND  Gastrointestinal: Denies abdominal pain, heartburn, nausea, vomiting, hematemesis, diarrhea, bloody stools  Genito-Urinary: Denies dysuria, frequency, difficulty in micturition, nocturia, incontinence  Musculoskeletal: Denies back pain, joint pain, muscle pain  Neurologic: Denies confusion, lightheadedness, syncope, headache, focal weakness, sensory changes, seizures  Endocrine: Denies polyuria, polydipsia, temperature intolerance  Allergy and Immunology: Denies hives, insect bite sensitivity  Hematological and Lymphatic: Denies bleeding problems, swollen glands   Psychological: Denies depression, suicidal ideation, anxiety, panic, mood swings  Dermatological: Denies pruritus, rash, skin lesion changes      Vitals:  Vitals:    08/17/20 1503   BP: 112/60   Pulse: 74 Temp: 98 4 °F (36 9 °C)   SpO2: 98%       Body mass index is 30 62 kg/m²  Weight (last 2 days)     Date/Time   Weight    08/17/20 1503   80 9 (178 4)                Physical Examination:  General: Patient is not in acute distress  Awake, alert, responding to commands  No weight gain or loss  Head: Normocephalic  Atraumatic  Eyes: Conjunctiva and lids with no swelling, erythema or discharge  Both pupils normal sized, round and reactive to light  Sclera nonicteric  ENT: External examination of nose and ear normal  Otoscopic examination shows translucent tympanic membranes with patent canals without erythema  Oropharynx moist with no erythema, edema, exudate or lesions  Neck: Supple  JVP not raised  Trachea midline  No masses  No thyromegaly  Lungs: No signs of increased work of breathing or respiratory distress  Bilateral bronchovascular breath sounds with no crackles or rhonchi  Chest wall: No tenderness  Cardiovascular: Normal PMI  No thrills  Regular rate and rhythm  S1 and S2 normal  No murmur, rub or gallop  Gastrointestinal: Abdomen soft, nontender  No guarding or rigidity  Liver and spleen not palpable  Bowel sounds present  Neurologic: Cranial nerves II-XII intact   Cortical functions normal  Motor system - Reflexes 2+ and symmetrical  Sensations normal  Musculoskeletal: Gait normal  No joint tenderness  Integumentary: Skin normal with no rash or lesions  Lymphatic: No palpable lymph nodes in neck, axilla or groin  Extremities: No clubbing, cyanosis, edema or varicosities  Psychological: Judgement and insight normal  Mood and affect normal      Laboratory Results:  CBC with diff:   Lab Results   Component Value Date    WBC 6 18 08/11/2020    WBC 8 4 08/07/2014    RBC 5 20 08/11/2020    RBC 4 97 08/07/2014    HGB 15 8 08/11/2020    HGB 14 5 08/07/2014    HCT 47 0 08/11/2020    HCT 43 8 08/07/2014    MCV 90 08/11/2020    MCV 88 08/07/2014    MCH 30 4 08/11/2020    MCH 29 3 08/07/2014    RDW 13 2 08/11/2020 RDW 13 2 08/07/2014     08/11/2020     08/07/2014       CMP:  Lab Results   Component Value Date    CREATININE 0 92 08/11/2020    CREATININE 0 8 08/07/2014    BUN 12 08/11/2020    BUN 12 08/07/2014     08/07/2014    K 4 1 08/11/2020    K 3 8 08/07/2014     (H) 08/11/2020     08/07/2014    CO2 25 08/11/2020    CO2 24 2 08/07/2014    GLUCOSE 78 08/07/2014    PROT 7 0 08/07/2014    ALKPHOS 60 08/11/2020    ALKPHOS 83 08/07/2014    ALT 43 08/11/2020    ALT 28 08/07/2014    AST 26 08/11/2020    AST 21 08/07/2014       No results found for: HGBA1C, MG, PHOS    No results found for: TROPONINI, CKMB, CKTOTAL    Lipid Profile:   Lab Results   Component Value Date    CHOL 138 08/07/2014     Lab Results   Component Value Date    HDL 45 08/11/2020    HDL 59 02/01/2020     Lab Results   Component Value Date    LDLCALC 66 08/11/2020    [facility] 63 02/01/2020     Lab Results   Component Value Date    TRIG 77 08/11/2020    TRIG 59 02/01/2020       Imaging Results:  MRI knee right  wo contrast  Narrative: MRI RIGHT KNEE    INDICATION:   S82 141A: Displaced bicondylar fracture of right tibia, initial encounter for closed fracture  [id number] 561: Pain in right knee  COMPARISON: Plain film dated 5/20/2018  CT dated 5/2/2018  TECHNIQUE:    MR sequences were obtained of the right knee including:  Localizer, axial T2 fat sat, coronal T1/T2 fat sat, sagittal PD/T2 fat sat  Images were acquired on a 1 5 Radha unit  Gadolinium was not used  FINDINGS:    SUBCUTANEOUS TISSUES: Normal    JOINT EFFUSION: None  BAKER'S CYST: Tiny lobulated Baker's cyst     MENISCI: Intact  CRUCIATE LIGAMENTS: Intact  EXTENSOR APPARATUS: Intact  COLLATERAL LIGAMENTS: Intact  ARTICULAR SURFACES: Degenerative change noted with grade 3 cartilage fissuring at the lateral patellar facet  There is also grade 3 cartilage loss at the mid trochlea      BONES: Slightly depressed lateral tibial plateau fracture with reactive marrow edema corresponding to CT findings  MUSCULATURE:  Within the medial gastrocnemius muscle there is a heterogeneous collection measuring up to 1 9 x 0 9 x 5 5 cm consistent with intramuscular hematoma  Impression: 1  Slightly depressed lateral tibial plateau fracture with reactive marrow edema  2   Medial gastrocnemius intramuscular hematoma  3   Undersurface posterior horn medial meniscus tear without evidence of flipped fragment  4   Degenerative change with grade 3 cartilage loss across the patellofemoral compartment      Workstation performed: [id number]       Health Maintenance:  Health Maintenance   Topic Date Due    DTaP,Tdap,and Td Vaccines (1 - Tdap) 08/29/1982    Annual Physical  06/13/2020    Influenza Vaccine  07/01/2020    BMI: Followup Plan  01/22/2021    Depression Screening PHQ  02/13/2021    BMI: Adult  05/27/2021    Colorectal Cancer Screening  10/13/2021    Hepatitis C Screening  Completed    Pneumococcal Vaccine: Pediatrics (0 to 5 Years) and At-Risk Patients (6 to 59 Years)  Aged Out    HIB Vaccine  Aged Out    Hepatitis B Vaccine  Aged Out    IPV Vaccine  Aged Out    Hepatitis A Vaccine  Aged Out    Meningococcal ACWY Vaccine  Aged Out    HPV Vaccine  Aged Out    HIV Screening  Discontinued     Immunization History   Administered Date(s) Administered    INFLUENZA 12/16/2008, 09/17/2018    Influenza TIV (IM) 10/01/2017    Influenza, injectable, quadrivalent, preservative free 0 5 mL 09/17/2018, 09/23/2019    Pneumococcal Polysaccharide PPV23 12/16/2008         Marc Hairston MD  8/17/2020,3:19 PM

## 2020-12-23 DIAGNOSIS — I10 ESSENTIAL HYPERTENSION: Chronic | ICD-10-CM

## 2020-12-24 RX ORDER — NEBIVOLOL 5 MG/1
5 TABLET ORAL DAILY
Qty: 90 TABLET | Refills: 3 | Status: SHIPPED | OUTPATIENT
Start: 2020-12-24 | End: 2021-04-29

## 2020-12-29 DIAGNOSIS — E78.5 HYPERLIPIDEMIA, UNSPECIFIED HYPERLIPIDEMIA TYPE: ICD-10-CM

## 2020-12-29 RX ORDER — ROSUVASTATIN CALCIUM 20 MG/1
20 TABLET, COATED ORAL DAILY
Qty: 90 TABLET | Refills: 3 | Status: SHIPPED | OUTPATIENT
Start: 2020-12-29 | End: 2021-12-02

## 2021-02-20 ENCOUNTER — LAB (OUTPATIENT)
Dept: LAB | Facility: CLINIC | Age: 61
End: 2021-02-20
Payer: COMMERCIAL

## 2021-02-20 DIAGNOSIS — E78.5 HYPERLIPIDEMIA LDL GOAL <100: Chronic | ICD-10-CM

## 2021-02-20 LAB
ALBUMIN SERPL BCP-MCNC: 3.9 G/DL (ref 3.5–5)
ALP SERPL-CCNC: 50 U/L (ref 46–116)
ALT SERPL W P-5'-P-CCNC: 35 U/L (ref 12–78)
ANION GAP SERPL CALCULATED.3IONS-SCNC: 5 MMOL/L (ref 4–13)
AST SERPL W P-5'-P-CCNC: 26 U/L (ref 5–45)
BASOPHILS # BLD AUTO: 0.08 THOUSANDS/ΜL (ref 0–0.1)
BASOPHILS NFR BLD AUTO: 1 % (ref 0–1)
BILIRUB SERPL-MCNC: 1.36 MG/DL (ref 0.2–1)
BUN SERPL-MCNC: 13 MG/DL (ref 5–25)
CALCIUM SERPL-MCNC: 9.2 MG/DL (ref 8.3–10.1)
CHLORIDE SERPL-SCNC: 108 MMOL/L (ref 100–108)
CHOLEST SERPL-MCNC: 134 MG/DL (ref 50–200)
CO2 SERPL-SCNC: 29 MMOL/L (ref 21–32)
CREAT SERPL-MCNC: 0.99 MG/DL (ref 0.6–1.3)
EOSINOPHIL # BLD AUTO: 0.41 THOUSAND/ΜL (ref 0–0.61)
EOSINOPHIL NFR BLD AUTO: 6 % (ref 0–6)
ERYTHROCYTE [DISTWIDTH] IN BLOOD BY AUTOMATED COUNT: 13.7 % (ref 11.6–15.1)
GFR SERPL CREATININE-BSD FRML MDRD: 83 ML/MIN/1.73SQ M
GLUCOSE P FAST SERPL-MCNC: 92 MG/DL (ref 65–99)
HCT VFR BLD AUTO: 48.5 % (ref 36.5–49.3)
HDLC SERPL-MCNC: 56 MG/DL
HGB BLD-MCNC: 16 G/DL (ref 12–17)
IMM GRANULOCYTES # BLD AUTO: 0.01 THOUSAND/UL (ref 0–0.2)
IMM GRANULOCYTES NFR BLD AUTO: 0 % (ref 0–2)
LDLC SERPL CALC-MCNC: 56 MG/DL (ref 0–100)
LYMPHOCYTES # BLD AUTO: 1.87 THOUSANDS/ΜL (ref 0.6–4.47)
LYMPHOCYTES NFR BLD AUTO: 26 % (ref 14–44)
MCH RBC QN AUTO: 30 PG (ref 26.8–34.3)
MCHC RBC AUTO-ENTMCNC: 33 G/DL (ref 31.4–37.4)
MCV RBC AUTO: 91 FL (ref 82–98)
MONOCYTES # BLD AUTO: 0.8 THOUSAND/ΜL (ref 0.17–1.22)
MONOCYTES NFR BLD AUTO: 11 % (ref 4–12)
NEUTROPHILS # BLD AUTO: 4.17 THOUSANDS/ΜL (ref 1.85–7.62)
NEUTS SEG NFR BLD AUTO: 56 % (ref 43–75)
NONHDLC SERPL-MCNC: 78 MG/DL
NRBC BLD AUTO-RTO: 0 /100 WBCS
PLATELET # BLD AUTO: 296 THOUSANDS/UL (ref 149–390)
PMV BLD AUTO: 10.6 FL (ref 8.9–12.7)
POTASSIUM SERPL-SCNC: 3.8 MMOL/L (ref 3.5–5.3)
PROT SERPL-MCNC: 7.1 G/DL (ref 6.4–8.2)
RBC # BLD AUTO: 5.34 MILLION/UL (ref 3.88–5.62)
SODIUM SERPL-SCNC: 142 MMOL/L (ref 136–145)
TRIGL SERPL-MCNC: 110 MG/DL
TSH SERPL DL<=0.05 MIU/L-ACNC: 2.57 UIU/ML (ref 0.36–3.74)
WBC # BLD AUTO: 7.34 THOUSAND/UL (ref 4.31–10.16)

## 2021-02-20 PROCEDURE — 80053 COMPREHEN METABOLIC PANEL: CPT

## 2021-02-20 PROCEDURE — 36415 COLL VENOUS BLD VENIPUNCTURE: CPT

## 2021-02-20 PROCEDURE — 85025 COMPLETE CBC W/AUTO DIFF WBC: CPT

## 2021-02-20 PROCEDURE — 80061 LIPID PANEL: CPT

## 2021-02-20 PROCEDURE — 84443 ASSAY THYROID STIM HORMONE: CPT

## 2021-02-25 ENCOUNTER — OFFICE VISIT (OUTPATIENT)
Dept: INTERNAL MEDICINE CLINIC | Facility: CLINIC | Age: 61
End: 2021-02-25
Payer: COMMERCIAL

## 2021-02-25 VITALS
SYSTOLIC BLOOD PRESSURE: 132 MMHG | DIASTOLIC BLOOD PRESSURE: 88 MMHG | TEMPERATURE: 97 F | HEART RATE: 65 BPM | OXYGEN SATURATION: 98 % | BODY MASS INDEX: 32.78 KG/M2 | HEIGHT: 64 IN | WEIGHT: 192 LBS

## 2021-02-25 DIAGNOSIS — M25.511 CHRONIC RIGHT SHOULDER PAIN: ICD-10-CM

## 2021-02-25 DIAGNOSIS — G89.29 CHRONIC RIGHT SHOULDER PAIN: ICD-10-CM

## 2021-02-25 DIAGNOSIS — I10 ESSENTIAL HYPERTENSION: Primary | Chronic | ICD-10-CM

## 2021-02-25 DIAGNOSIS — E78.5 HYPERLIPIDEMIA LDL GOAL <100: Chronic | ICD-10-CM

## 2021-02-25 DIAGNOSIS — E66.9 OBESITY (BMI 30.0-34.9): Chronic | ICD-10-CM

## 2021-02-25 DIAGNOSIS — I25.10 CORONARY ARTERIOSCLEROSIS IN NATIVE ARTERY: ICD-10-CM

## 2021-02-25 PROCEDURE — 3725F SCREEN DEPRESSION PERFORMED: CPT | Performed by: INTERNAL MEDICINE

## 2021-02-25 PROCEDURE — 99214 OFFICE O/P EST MOD 30 MIN: CPT | Performed by: INTERNAL MEDICINE

## 2021-02-25 NOTE — PROGRESS NOTES
INTERNAL MEDICINE OFFICE VISIT  [facility]  [place] [address] [facility]ly [facility]  Tel: [phone]      NAME: General Ryan  AGE: 62 y o  SEX: male  : 1960   MRN: [id number]    DATE: 2021  TIME: 9:22 AM      Assessment and Plan:  1  Essential hypertension   continue present medications    2  Hyperlipidemia LDL goal <100   continue Crestor  - CBC and differential; Future  - Comprehensive metabolic panel; Future  - Lipid panel; Future  - TSH, 3rd generation; Future    3  Coronary arteriosclerosis in native artery   follow-up with cardiology    4  Chronic right shoulder pain   was advised to follow-up with orthopedics    5  Obesity (BMI 30 0-34  9)  BMI Counseling: Body mass index is 32 96 kg/m²  The BMI is above normal  Nutrition recommendations include decreasing portion sizes, encouraging healthy choices of fruits and vegetables and moderation in carbohydrate intake  Exercise recommendations include moderate physical activity 150 minutes/week  No pharmacotherapy was ordered  - Counseling Documentation: patient was counseled regarding: diagnostic results, instructions for management, risk factor reductions, prognosis, patient and family education, risks and benefits of treatment options and importance of compliance with treatment  - Medication Side Effects: Adverse side effects of medications were reviewed with the patient/guardian today  Return for follow up visit in  6 months or earlier, if needed        Chief Complaint:  Chief Complaint   Patient presents with    Annual Exam    Shoulder Pain     right         History of Present Illness:    hypertension -well controlled   Hyperlipidemia -takes Crestor regularly   Coronary artery disease -stable and asymptomatic   Shoulder pain -complains of pain and restricted movement of his right shoulder for a few months   Obesity -needs to lose weight      Active Problem List:  Patient Active Problem List   Diagnosis    Coronary arteriosclerosis in native artery    Gastroesophageal reflux disease without esophagitis    Hyperlipidemia LDL goal <100    Essential hypertension    Obesity (BMI 30 0-34  9)    Chronic right shoulder pain         Past Medical History:  Past Medical History:   Diagnosis Date    CAD (coronary artery disease) 2009    50% RCA stenosis on coronary angiogram     GERD (gastroesophageal reflux disease)     Hyperlipidemia     Hypertension     Obesity     Schatzki's ring 02/07/2014         Past Surgical History:  Past Surgical History:   Procedure Laterality Date    CARDIAC CATHETERIZATION  2009, 2018    COLONOSCOPY  10/13/2011    ESOPHAGOGASTRODUODENOSCOPY           Family History:  Family History   Problem Relation Age of Onset    Coronary artery disease Father     Heart attack Father 40    Arrhythmia Father     Cancer Maternal Aunt         GI         Social History:  Social History     Socioeconomic History    Marital status: /Civil Union     Spouse name: None    Number of children: None    Years of education: None    Highest education level: None   Occupational History    None   Social Needs    Financial resource strain: None    Food insecurity     Worry: None     Inability: None    Transportation needs     Medical: None     Non-medical: None   Tobacco Use    Smoking status: Never Smoker    Smokeless tobacco: Never Used   Substance and Sexual Activity    Alcohol use: Yes     Frequency: 2-3 times a week     Drinks per session: 1 or 2     Binge frequency: Never     Comment: 5 a week    Drug use: No    Sexual activity: Yes     Partners: Female   Lifestyle    Physical activity     Days per week: 0 days     Minutes per session: 0 min    Stress: Not at all   Relationships    Social connections     Talks on phone: None     Gets together: None     Attends Caodaism service: None     Active member of club or organization: None     Attends meetings of clubs or organizations: None     Relationship status: None    Intimate partner violence     Fear of current or ex partner: None     Emotionally abused: None     Physically abused: None     Forced sexual activity: None   Other Topics Concern    None   Social History Narrative    None         Allergies:   Allergies   Allergen Reactions    Dilaudid [Hydromorphone Hcl] Other (See Comments)     Pt states he had a bad experience, blood pressure dropped          Medications:    Current Outpatient Medications:     aspirin 81 MG tablet, Take by mouth daily , Disp: , Rfl:     Coenzyme Q10 (CO Q 10) 10 MG CAPS, Take by mouth daily , Disp: , Rfl:     multivitamin (THERAGRAN) TABS, Take 1 tablet by mouth daily, Disp: , Rfl:     nebivolol (BYSTOLIC) 5 mg tablet, Take 1 tablet (5 mg total) by mouth daily, Disp: 90 tablet, Rfl: 3    Omega-3 Fatty Acids ([employer] FISH OIL) 1200 MG CAPS, [employer] Fish Oil 1200 MG Oral Capsule; daily, Disp: , Rfl:     rosuvastatin (CRESTOR) 20 MG tablet, Take 1 tablet (20 mg total) by mouth daily, Disp: 90 tablet, Rfl: 3      The following portions of the patient's history were reviewed and updated as appropriate: past medical history, past surgical history, family history, social history, allergies, current medications and active problem list       Review of Systems:  Constitutional: Denies fever, chills, weight gain, weight loss, fatigue  Eyes: Denies eye redness, eye discharge, double vision, change in visual acuity  ENT: Denies hearing loss, tinnitus, sneezing, nasal congestion, nasal discharge, sore throat   Respiratory: Denies cough, expectoration, hemoptysis, shortness of breath, wheezing  Cardiovascular: Denies chest pain, palpitations, lower extremity swelling, orthopnea, PND  Gastrointestinal: Denies abdominal pain, heartburn, nausea, vomiting, hematemesis, diarrhea, bloody stools  Genito-Urinary: Denies dysuria, frequency, difficulty in micturition, nocturia, incontinence  Musculoskeletal: Denies back pain,  Complains of pain and limited movement of his right shoulder  Neurologic: Denies confusion, lightheadedness, syncope, headache, focal weakness, sensory changes, seizures  Endocrine: Denies polyuria, polydipsia, temperature intolerance  Allergy and Immunology: Denies hives, insect bite sensitivity  Hematological and Lymphatic: Denies bleeding problems, swollen glands   Psychological: Denies depression, suicidal ideation, anxiety, panic, mood swings  Dermatological: Denies pruritus, rash, skin lesion changes      Vitals:  Vitals:    02/25/21 0900   BP: 132/88   Pulse: 65   Temp: (!) 97 °F (36 1 °C)   SpO2: 98%       Body mass index is 32 96 kg/m²  Weight (last 2 days)     Date/Time   Weight    02/25/21 0900   87 1 (192)                Physical Examination:  General: Patient is not in acute distress  Awake, alert, responding to commands  No weight gain or loss  Head: Normocephalic  Atraumatic  Eyes: Conjunctiva and lids with no swelling, erythema or discharge  Both pupils normal sized, round and reactive to light  Sclera nonicteric  ENT: External examination of nose and ear normal  Otoscopic examination shows translucent tympanic membranes with patent canals without erythema  Oropharynx moist with no erythema, edema, exudate or lesions  Neck: Supple  JVP not raised  Trachea midline  No masses  No thyromegaly  Lungs: No signs of increased work of breathing or respiratory distress  Bilateral bronchovascular breath sounds with no crackles or rhonchi  Chest wall: No tenderness  Cardiovascular: Normal PMI  No thrills  Regular rate and rhythm  S1 and S2 normal  No murmur, rub or gallop  Gastrointestinal: Abdomen soft, nontender  No guarding or rigidity  Liver and spleen not palpable  Bowel sounds present  Neurologic: Cranial nerves II-XII intact   Cortical functions normal  Motor system - Reflexes 2+ and symmetrical  Sensations normal  Musculoskeletal: Gait normal   Right shoulder joint tenderness, limitation of movements especially overhead abduction  Integumentary: Skin normal with no rash or lesions  Lymphatic: No palpable lymph nodes in neck, axilla or groin  Extremities: No clubbing, cyanosis, edema or varicosities  Psychological: Judgement and insight normal  Mood and affect normal      Laboratory Results:  CBC with diff:   Lab Results   Component Value Date    WBC 7 34 02/20/2021    WBC 8 4 08/07/2014    RBC 5 34 02/20/2021    RBC 4 97 08/07/2014    HGB 16 0 02/20/2021    HGB 14 5 08/07/2014    HCT 48 5 02/20/2021    HCT 43 8 08/07/2014    MCV 91 02/20/2021    MCV 88 08/07/2014    MCH 30 0 02/20/2021    MCH 29 3 08/07/2014    RDW 13 7 02/20/2021    RDW 13 2 08/07/2014     02/20/2021     08/07/2014       CMP:  Lab Results   Component Value Date    CREATININE 0 99 02/20/2021    CREATININE 0 8 08/07/2014    BUN 13 02/20/2021    BUN 12 08/07/2014     08/07/2014    K 3 8 02/20/2021    K 3 8 08/07/2014     02/20/2021     08/07/2014    CO2 29 02/20/2021    CO2 24 2 08/07/2014    GLUCOSE 78 08/07/2014    PROT 7 0 08/07/2014    ALKPHOS 50 02/20/2021    ALKPHOS 83 08/07/2014    ALT 35 02/20/2021    ALT 28 08/07/2014    AST 26 02/20/2021    AST 21 08/07/2014       No results found for: HGBA1C, MG, PHOS    No results found for: TROPONINI, CKMB, CKTOTAL    Lipid Profile:   Lab Results   Component Value Date    CHOL 138 08/07/2014     Lab Results   Component Value Date    HDL 56 02/20/2021    HDL 45 08/11/2020     Lab Results   Component Value Date    LDLCALC 56 02/20/2021    [address] 08/11/2020     Lab Results   Component Value Date    TRIG 110 02/20/2021    TRIG 77 08/11/2020       Imaging Results:  MRI knee right  wo contrast  Narrative: MRI RIGHT KNEE    INDICATION:   S82 141A: Displaced bicondylar fracture of right tibia, initial encounter for closed fracture  M25 561: Pain in right knee  COMPARISON: Plain film dated 5/20/2018  CT dated 5/2/2018      TECHNIQUE:    MR sequences were obtained of the right knee including:  Localizer, axial T2 fat sat, coronal T1/T2 fat sat, sagittal PD/T2 fat sat  Images were acquired on a 1 5 Radha unit  Gadolinium was not used  FINDINGS:    SUBCUTANEOUS TISSUES: Normal    JOINT EFFUSION: None  BAKER'S CYST: Tiny lobulated Baker's cyst     MENISCI: Intact  CRUCIATE LIGAMENTS: Intact  EXTENSOR APPARATUS: Intact  COLLATERAL LIGAMENTS: Intact  ARTICULAR SURFACES: Degenerative change noted with grade 3 cartilage fissuring at the lateral patellar facet  There is also grade 3 cartilage loss at the mid trochlea  BONES: Slightly depressed lateral tibial plateau fracture with reactive marrow edema corresponding to CT findings  MUSCULATURE:  Within the medial gastrocnemius muscle there is a heterogeneous collection measuring up to 1 9 x 0 9 x 5 5 cm consistent with intramuscular hematoma  Impression: 1  Slightly depressed lateral tibial plateau fracture with reactive marrow edema  2   Medial gastrocnemius intramuscular hematoma  3   Undersurface posterior horn medial meniscus tear without evidence of flipped fragment  4   Degenerative change with grade 3 cartilage loss across the patellofemoral compartment      Workstation performed: [id number]       Health Maintenance:  Health Maintenance   Topic Date Due    DTaP,Tdap,and Td Vaccines (1 - Tdap) 08/29/1982    Annual Physical  06/13/2020    Influenza Vaccine (1) 09/01/2020    BMI: Followup Plan  01/22/2021    Colorectal Cancer Screening  10/13/2021    Depression Screening PHQ  02/25/2022    BMI: Adult  02/25/2022    Hepatitis C Screening  Completed    Pneumococcal Vaccine: Pediatrics (0 to 5 Years) and At-Risk Patients (6 to 59 Years)  Aged Out    HIB Vaccine  Aged Out    Hepatitis B Vaccine  Aged Out    IPV Vaccine  Aged Out    Hepatitis A Vaccine  Aged Out    Meningococcal ACWY Vaccine  Aged Out    HPV Vaccine  Aged Out    HIV Screening  Discontinued Immunization History   Administered Date(s) Administered    INFLUENZA 12/16/2008, 09/17/2018    Influenza, injectable, quadrivalent, preservative free 0 5 mL 09/17/2018, 09/23/2019    Influenza, seasonal, injectable 10/01/2017    Pneumococcal Polysaccharide PPV23 12/16/2008         Maricarmen Huff MD  2/25/2021,9:22 AM

## 2021-03-05 ENCOUNTER — APPOINTMENT (OUTPATIENT)
Dept: RADIOLOGY | Facility: CLINIC | Age: 61
End: 2021-03-05
Payer: COMMERCIAL

## 2021-03-05 ENCOUNTER — OFFICE VISIT (OUTPATIENT)
Dept: OBGYN CLINIC | Facility: CLINIC | Age: 61
End: 2021-03-05
Payer: COMMERCIAL

## 2021-03-05 VITALS
SYSTOLIC BLOOD PRESSURE: 124 MMHG | HEART RATE: 69 BPM | HEIGHT: 64 IN | DIASTOLIC BLOOD PRESSURE: 89 MMHG | BODY MASS INDEX: 32.78 KG/M2 | WEIGHT: 192 LBS

## 2021-03-05 DIAGNOSIS — M50.30 DEGENERATIVE DISC DISEASE, CERVICAL: ICD-10-CM

## 2021-03-05 DIAGNOSIS — G25.89 SCAPULAR DYSKINESIS: ICD-10-CM

## 2021-03-05 DIAGNOSIS — M62.58 MUSCLE ATROPHY OF UPPER EXTREMITY: ICD-10-CM

## 2021-03-05 DIAGNOSIS — M75.41 SUBACROMIAL IMPINGEMENT OF RIGHT SHOULDER: ICD-10-CM

## 2021-03-05 DIAGNOSIS — M47.812 FACET ARTHROPATHY, CERVICAL: Primary | ICD-10-CM

## 2021-03-05 PROCEDURE — 72052 X-RAY EXAM NECK SPINE 6/>VWS: CPT

## 2021-03-05 PROCEDURE — 73030 X-RAY EXAM OF SHOULDER: CPT

## 2021-03-05 PROCEDURE — 99214 OFFICE O/P EST MOD 30 MIN: CPT | Performed by: FAMILY MEDICINE

## 2021-03-05 RX ORDER — MELOXICAM 15 MG/1
15 TABLET ORAL DAILY
Qty: 30 TABLET | Refills: 0 | Status: SHIPPED | OUTPATIENT
Start: 2021-03-05 | End: 2021-09-02 | Stop reason: ALTCHOICE

## 2021-03-05 NOTE — PROGRESS NOTES
Assessment/Plan:  Assessment/Plan   Diagnoses and all orders for this visit:    Facet arthropathy, cervical  -     meloxicam (MOBIC) 15 mg tablet; Take 1 tablet (15 mg total) by mouth daily  -     Ambulatory referral to Physical Therapy; Future    Degenerative disc disease, cervical  -     Ambulatory referral to Physical Therapy; Future    Subacromial impingement of right shoulder  -     XR shoulder 2+ vw right; Future  -     XR spine cervical complete 6+ vw flex/ext/obl; Future  -     Ambulatory referral to Physical Therapy; Future    Muscle atrophy of upper extremity  -     EMG 1 Limb; Future  -     Ambulatory referral to Physical Therapy; Future    Scapular dyskinesis  -     Ambulatory referral to Physical Therapy; Future        81-year-old right-hand-dominant male with right shoulder pain more than 1 year duration  Discussed with patient physical exam, radiographs, impression and plan  X-rays the right shoulder are unremarkable for osseous abnormality  X-rays cervical spine noted for degenerative disc disease most pronounced C6-C7  On physical exam there is appreciable atrophy of the trapezius and scapular musculature on the right  There is no bony or soft tissue tenderness  He has range of motion limited to forward flexion of 160°, abduction 140°, and internal rotation to lumbar spine  Has 4+/5 strength abduction, external rotation, and supraspinatus  There is no pain with empty can testing or Eaton maneuver  He has intact sensation, biceps reflex, and radial pulse in both upper extremities  Cervical spine is unremarkable for midline or paraspinal tenderness  There is mild limited motion with extension and side bending to both sides  There is negative axial load and negative Spurling's  Clinical impression is that he may be symptomatic combination of nerve impingement and abnormal musculoskeletal mechanics of the shoulder    I discussed regimen of anti-inflammatory, supplements, and physical therapy  He is to take meloxicam 15 mg once daily food consistently for 30 days  He is to start taking tumeric 500 mg twice daily, tart cherry at least 1000 mg daily, and glucosamine-chondroitin supplement 2 to 3 times a day  He is to start physical therapy as soon as possible and do home exercises as directed  I will also refer him for EMG study to evaluate for neuropathy/nerve impingement  He will follow up after getting EMG study done  Subjective:   Patient ID: Elana Conway is a 62 y o  male  Chief Complaint   Patient presents with    Right Shoulder - Pain       56-year-old right-hand-dominant male presents for evaluation of right shoulder pain more than 1 year duration  He denies any particular trauma or inciting event  He has pain described as generalized to the shoulder, achy and sore, gradual in onset, worse with prolonged and repetitive physical activity, nonradiating, associated with stiffness and limited range of motion, and improved with resting  He reports that pain is not severe, but does get more bothersome when he is more physically active  He does not typically need to take any medication for pain  Shoulder Pain  This is a chronic problem  The current episode started more than 1 year ago  The problem occurs daily  The problem has been waxing and waning  Associated symptoms include arthralgias  Pertinent negatives include no joint swelling, numbness or weakness  Exacerbated by: Arm movement, physical activity  He has tried rest and position changes for the symptoms  The treatment provided mild relief  Review of Systems   Musculoskeletal: Positive for arthralgias  Negative for joint swelling  Neurological: Negative for weakness and numbness         Objective:  Vitals:    03/05/21 0929   BP: 124/89   Pulse: 69   Weight: 87 1 kg (192 lb)   Height: 5' 4" (1 626 m)     Back Exam     Reflexes   Biceps: normal    Comments:    Cervical spine  -no tenderness  -limited range of motion with extension and side bending to both sides   -negative axial load  -negative Spurling's  -normal sensation, biceps reflex, in both upper extremities      Right Hand Exam     Muscle Strength   The patient has normal right wrist strength  Other   Sensation: normal  Pulse: present      Left Hand Exam     Muscle Strength   The patient has normal left wrist strength  Other   Sensation: normal  Pulse: present      Right Elbow Exam     Tenderness   The patient is experiencing no tenderness  Range of Motion   The patient has normal right elbow ROM  Muscle Strength   The patient has normal right elbow strength (5/5 strength flexion and extension)  Other   Sensation: normal      Left Elbow Exam     Muscle Strength   Left elbow normal strength: 5/5 strength flexion and extension  Other   Sensation: normal      Right Shoulder Exam     Tenderness   The patient is experiencing no tenderness  Range of Motion   Active abduction: 140   Forward flexion: 160   Internal rotation 0 degrees: Lumbar     Muscle Strength   Right shoulder normal muscle strength: 4+/5 strength abduction, external rotation, and supraspinatus  Internal rotation: 5/5     Tests   Eaton test: negative    Other   Sensation: normal    Comments:  Atrophy of trapezius and scapular musculature  No pain with empty can test      Left Shoulder Exam     Muscle Strength   Abduction: 5/5   Internal rotation: 5/5   External rotation: 5/5   Supraspinatus: 5/5     Other   Sensation: normal           Strength/Myotome Testing     Left Wrist/Hand   Normal wrist strength    Right Wrist/Hand   Normal wrist strength      Physical Exam  Vitals signs and nursing note reviewed  Constitutional:       General: He is not in acute distress  Appearance: He is well-developed  He is not ill-appearing or diaphoretic  HENT:      Head: Normocephalic and atraumatic        Right Ear: External ear normal       Left Ear: External ear normal    Eyes: Conjunctiva/sclera: Conjunctivae normal    Neck:      Trachea: No tracheal deviation  Cardiovascular:      Rate and Rhythm: Normal rate  Pulmonary:      Effort: Pulmonary effort is normal  No respiratory distress  Abdominal:      General: There is no distension  Musculoskeletal:         General: No swelling, tenderness, deformity or signs of injury  Skin:     General: Skin is warm and dry  Coloration: Skin is not jaundiced or pale  Neurological:      Mental Status: He is alert and oriented to person, place, and time  Psychiatric:         Mood and Affect: Mood normal          Behavior: Behavior normal          Thought Content: Thought content normal          Judgment: Judgment normal          I have personally reviewed pertinent films in PACS and my interpretation is No osseous abnormalities right shoulder  Cervical spine multilevel degenerative disease most severe at C6-C7

## 2021-03-10 DIAGNOSIS — Z23 ENCOUNTER FOR IMMUNIZATION: ICD-10-CM

## 2021-03-17 ENCOUNTER — IMMUNIZATIONS (OUTPATIENT)
Dept: FAMILY MEDICINE CLINIC | Facility: HOSPITAL | Age: 61
End: 2021-03-17

## 2021-03-17 DIAGNOSIS — Z23 ENCOUNTER FOR IMMUNIZATION: Primary | ICD-10-CM

## 2021-03-17 PROCEDURE — 91301 SARS-COV-2 / COVID-19 MRNA VACCINE (MODERNA) 100 MCG: CPT

## 2021-03-17 PROCEDURE — 0011A SARS-COV-2 / COVID-19 MRNA VACCINE (MODERNA) 100 MCG: CPT

## 2021-03-18 ENCOUNTER — TELEPHONE (OUTPATIENT)
Dept: NEUROLOGY | Facility: CLINIC | Age: 61
End: 2021-03-18

## 2021-03-19 ENCOUNTER — PROCEDURE VISIT (OUTPATIENT)
Dept: NEUROLOGY | Facility: CLINIC | Age: 61
End: 2021-03-19
Payer: COMMERCIAL

## 2021-03-19 DIAGNOSIS — M62.58 MUSCLE ATROPHY OF UPPER EXTREMITY: ICD-10-CM

## 2021-03-19 PROCEDURE — 95886 MUSC TEST DONE W/N TEST COMP: CPT | Performed by: PSYCHIATRY & NEUROLOGY

## 2021-03-19 PROCEDURE — 95909 NRV CNDJ TST 5-6 STUDIES: CPT | Performed by: PSYCHIATRY & NEUROLOGY

## 2021-03-19 NOTE — PROGRESS NOTES
EMG 1 Limb     Date/Time 3/19/2021 1:04 PM     Performed by  Blank Bocanegra MD     Authorized by Abebe Stanton DO            EMG RIGHT UPPER EXTREMITY    Patient reports a greater than 1 year history of right shoulder pain  Recently he has noticed difficulty in elevating the arm as well as throwing a ball  He was noted to have some drooping of the right shoulder as well as possible atrophy of the trapezius and supraspinatus / infraspinatus musculature    Motor and sensory conduction studies were performed on the right median, ulnar, accessory, suprascapular and as well as radial sensory nerves  The the accessory distal motor latency was mildly prolonged at 3 5 milliseconds ( normal up to 3 milliseconds) while the other distal motor latencies were normal  The motor action potential amplitudes were normal  Motor conduction velocities were normal including conduction of the ulnar nerve across the elbow  The right median and ulnar F waves were normal     Right median, radial and ulnar sensory latencies were normal including median palmar stimulation with normal sensory action potential amplitudes  Concentric needle EMG was performed in various distal and proximal muscles of the right upper extremity including  FDI, EDC, brachial radialis, biceps, triceps, deltoid, trapezius, infraspinatus, supraspinatus and in the low cervical paraspinal region  There was no evidence of spontaneous activity seen  The compound motor unit potentials were of normal configuration and interference patterns were full or full for effort    IMPRESSION: This is an abnormal EMG of the right upper extremity due to a delay in the distal latency of the accessory nerve on the right with demyelinative change      LETICIA Wayne

## 2021-03-26 ENCOUNTER — OFFICE VISIT (OUTPATIENT)
Dept: OBGYN CLINIC | Facility: CLINIC | Age: 61
End: 2021-03-26
Payer: COMMERCIAL

## 2021-03-26 VITALS
SYSTOLIC BLOOD PRESSURE: 148 MMHG | WEIGHT: 197 LBS | HEIGHT: 64 IN | BODY MASS INDEX: 33.63 KG/M2 | DIASTOLIC BLOOD PRESSURE: 94 MMHG | HEART RATE: 59 BPM

## 2021-03-26 DIAGNOSIS — G52.8 ACCESSORY NERVE DISORDER: Primary | ICD-10-CM

## 2021-03-26 DIAGNOSIS — G25.89 SCAPULAR DYSKINESIS: ICD-10-CM

## 2021-03-26 DIAGNOSIS — M62.58 MUSCLE ATROPHY OF UPPER EXTREMITY: ICD-10-CM

## 2021-03-26 DIAGNOSIS — M75.41 SUBACROMIAL IMPINGEMENT OF RIGHT SHOULDER: ICD-10-CM

## 2021-03-26 DIAGNOSIS — M47.812 FACET ARTHROPATHY, CERVICAL: ICD-10-CM

## 2021-03-26 PROCEDURE — 1036F TOBACCO NON-USER: CPT | Performed by: FAMILY MEDICINE

## 2021-03-26 PROCEDURE — 99213 OFFICE O/P EST LOW 20 MIN: CPT | Performed by: FAMILY MEDICINE

## 2021-03-26 NOTE — PROGRESS NOTES
Assessment/Plan:  Assessment/Plan   Diagnoses and all orders for this visit:    Accessory nerve disorder  -     Ambulatory referral to Physical Therapy; Future  -     Ambulatory referral to Neurosurgery; Future    Scapular dyskinesis  -     Ambulatory referral to Physical Therapy; Future    Muscle atrophy of upper extremity  -     Ambulatory referral to Physical Therapy; Future    Facet arthropathy, cervical  -     Ambulatory referral to Physical Therapy; Future    Subacromial impingement of right shoulder  -     Ambulatory referral to Physical Therapy; Future      64-year-old right-hand-dominant male with right shoulder pain more than 1 year duration  Discussed with patient EMG findings, impression and plan  EMG of the right upper extremity is noted for abnormality in the accessory nerve on the right with delay in latency and demyelinative  Clinical impression is that disorder may be due to focal nerve entrapment/lesion  I discussed that we may initiate conservative management in the form of physical therapy  I will also refer him to neurosurgeon for 2nd opinion regarding other options  He will follow up after seeing Neurosurgery  Subjective:   Patient ID: Emily Quick is a 62 y o  male  Chief Complaint   Patient presents with    Right Shoulder - Follow-up       64-year-old right-dominant male following up for right shoulder pain of more than 1 year duration  He was last seen by me 3 weeks ago at which point was referred for EMG study of the right upper extremity due to atrophy of right upper extremity musculature  He was prescribed meloxicam 15 mg once daily and referred to formal physical therapy  He is scheduled to start physical therapy at 03/29/2021 visit  He denies any changes in his symptoms since his last   He is not experiencing much pain    He experiences with described as achy and soreness of the shoulder and scapular region, worse with activity, associated with limited range of motion and weakness, and improved with resting  Shoulder Pain  This is a chronic problem  The current episode started more than 1 year ago  The problem occurs daily  The problem has been unchanged  Associated symptoms include arthralgias and weakness  Pertinent negatives include no joint swelling or numbness  Exacerbated by: Physical activity  He has tried rest, NSAIDs and position changes for the symptoms  The treatment provided mild relief  Review of Systems   Musculoskeletal: Positive for arthralgias  Negative for joint swelling  Neurological: Positive for weakness  Negative for numbness  Objective:  Vitals:    03/26/21 0857   BP: 148/94   Pulse: 59   Weight: 89 4 kg (197 lb)   Height: 5' 4" (1 626 m)     Ortho Exam    Physical Exam  Vitals signs and nursing note reviewed  Constitutional:       General: He is not in acute distress  Appearance: He is well-developed  HENT:      Head: Normocephalic and atraumatic  Right Ear: External ear normal       Left Ear: External ear normal    Eyes:      Conjunctiva/sclera: Conjunctivae normal    Neck:      Trachea: No tracheal deviation  Cardiovascular:      Rate and Rhythm: Normal rate  Pulmonary:      Effort: Pulmonary effort is normal  No respiratory distress  Abdominal:      General: There is no distension  Skin:     General: Skin is warm and dry  Neurological:      Mental Status: He is alert and oriented to person, place, and time     Psychiatric:         Behavior: Behavior normal

## 2021-03-29 ENCOUNTER — EVALUATION (OUTPATIENT)
Dept: PHYSICAL THERAPY | Facility: CLINIC | Age: 61
End: 2021-03-29
Payer: COMMERCIAL

## 2021-03-29 DIAGNOSIS — G25.89 SCAPULAR DYSKINESIS: ICD-10-CM

## 2021-03-29 DIAGNOSIS — M75.41 SUBACROMIAL IMPINGEMENT OF RIGHT SHOULDER: ICD-10-CM

## 2021-03-29 DIAGNOSIS — M62.58 MUSCLE ATROPHY OF UPPER EXTREMITY: ICD-10-CM

## 2021-03-29 DIAGNOSIS — M47.812 FACET ARTHROPATHY, CERVICAL: ICD-10-CM

## 2021-03-29 DIAGNOSIS — M50.30 DEGENERATIVE DISC DISEASE, CERVICAL: ICD-10-CM

## 2021-03-29 PROCEDURE — 97112 NEUROMUSCULAR REEDUCATION: CPT | Performed by: PHYSICAL THERAPIST

## 2021-03-29 PROCEDURE — 97110 THERAPEUTIC EXERCISES: CPT | Performed by: PHYSICAL THERAPIST

## 2021-03-29 PROCEDURE — 97162 PT EVAL MOD COMPLEX 30 MIN: CPT | Performed by: PHYSICAL THERAPIST

## 2021-03-29 RX ORDER — LANOLIN ALCOHOL/MO/W.PET/CERES
1 CREAM (GRAM) TOPICAL 2 TIMES DAILY
COMMUNITY

## 2021-03-29 NOTE — PROGRESS NOTES
PT Evaluation     Today's date: 3/29/2021  Patient name: Juana Syed  : 1960  MRN: [id number]  Referring provider: Tomy Mccall DO  Dx:   Encounter Diagnosis     ICD-10-CM    1  Facet arthropathy, cervical  M47 812 Ambulatory referral to Physical Therapy   2  Degenerative disc disease, cervical  M50 30 Ambulatory referral to Physical Therapy   3  Subacromial impingement of right shoulder  M75 41 Ambulatory referral to Physical Therapy   4  Muscle atrophy of upper extremity  M62 58 Ambulatory referral to Physical Therapy   5  Scapular dyskinesis  G25 89 Ambulatory referral to Physical Therapy                  Assessment  Assessment details: Patient is a 61 y/o male with chief complaints of right shoulder pain that began at least several months ago  Patient presents with decreased functional mobility due to increased shoulder pain, decreased right shoulder strength, decreased right shoulder ROM associated with scapular dyskinesia  Patient also has abnormal EMG of accessory nerve and is seeing neurosurgeon for follow up  Patient will benefit from skilled physical therapy to address impairment and improve functional mobility  PT needed to allow for return to maximal function and improve quality of life  Impairments: abnormal or restricted ROM, activity intolerance, impaired physical strength, lacks appropriate home exercise program and pain with function  Understanding of Dx/Px/POC: good   Prognosis: good    Goals  STG within 4 weeks:   1  Patient to be independent in HEP  2  Reduce pain by 50% to improve quality of life  3  Improve shoulder strength to 4+  LTG within 8 weeks:   1  Patient to be independent in ADLs/IADLs without difficulty  2  Achieve full AROM without pain  3  Patient to be able to perform overhead tasks without pain       Plan  Patient would benefit from: skilled physical therapy and PT eval  Planned modality interventions: cryotherapy, hydrotherapy and unattended electrical stimulation  Planned therapy interventions: therapeutic training, therapeutic exercise, therapeutic activities, stretching, strengthening, postural training, patient education, neuromuscular re-education, manual therapy, joint mobilization, IADL retraining, activity modification, ADL retraining, ADL training, body mechanics training, flexibility, functional ROM exercises, gait training, graded activity, graded exercise, graded motor and home exercise program  Frequency: 2x week  Duration in weeks: 8  Plan of Care beginning date: 3/29/2021  Plan of Care expiration date: 2021  Treatment plan discussed with: patient        Subjective Evaluation    History of Present Illness  Mechanism of injury: Patient is a 61 y/o RHD male with chief complaints of right shoulder pain that he noticed at least several months ago  Last August, patient retired and was doing more physical work  That's when he started noticing more shoulder discomfort and difficulty with certain movements  He was seen by ortho and referred for PT  Cervical xray shows: No fracture  Normal alignment without subluxation  Moderate degenerative disc space narrowing at C4-C5, C5-C6, and C6-C7  Mild left C3-C4 and C4-C5 neural foramen narrowing  Mild right C4-C5 neural foramen narrowing  No evidence of dynamic instability seen with flexion or extension      Shoulder xray: normal    Pain  Current pain ratin  At best pain ratin  At worst pain ratin  Quality: burning  Relieving factors: medications  Progression: no change    Social Support    Employment status: not working (retired )  Hand dominance: right  Exercise history: None   Life stress: low      Diagnostic Tests  Abnormal x-ray: see subjective   Treatments  No previous or current treatments  Patient Goals  Patient goal: "to be able to use my arm properly"         Objective     Concurrent Complaints  Negative for night pain, disturbed sleep, dizziness, faints, headaches, nausea/motion sickness, tinnitus, trouble swallowing, difficulty breathing, shortness of breath, respiratory pain and visual change    Active Range of Motion   Cervical/Thoracic Spine       Cervical    Flexion:  WFL  Extension:  Restriction level: minimal  Left lateral flexion:  WFL  Right lateral flexion:  Restriction level minimal  Left rotation:  WFL  Left Shoulder   Flexion: 160 degrees   Extension: 60 degrees   Abduction: 160 degrees   External rotation 0°: 90 degrees   Internal rotation BTB: T7     Right Shoulder   Flexion: 155 degrees   Extension: 50 degrees   Abduction: 145 degrees   External rotation 0°: 80 degrees   Internal rotation BTB: T12     Strength/Myotome Testing     Left Shoulder     Planes of Motion   Flexion: 4+   Abduction: 4+   External rotation at 0°: 4+   Internal rotation at 0°: 4+     Right Shoulder     Planes of Motion   Flexion: 4   Abduction: 4   External rotation at 0°: 4   Internal rotation at 0°: 4+     Additional Strength Details  Scapular musculature MMT: to be completed     General Comments:      Shoulder Comments   Forward shoulder/forward head   Lower right shoulder   Altered scapular movement with overhead motion   Neuro Exam:     Headaches   Patient reports headaches: No               Precautions: HTN, high cholesterol     Increased time spent on patient education with diagnosis, prognosis, goals of therapy, progression of therapy, and plan of care  All questions answered  Patient instructed to call clinic with questions or concerns       Manuals 3/29                                                                Neuro Re-Ed             Pt Edu- posture KS            PSR x20            scap retraction  x20             Thoracic extension 3"x20             Scap wall clocks NV                                      Ther Ex             TB Row/Ext Red 2x10 ea            Shoulder shrugs NV                                                                                          Ther Activity Gait Training                                       Modalities

## 2021-04-01 ENCOUNTER — CONSULT (OUTPATIENT)
Dept: NEUROSURGERY | Facility: CLINIC | Age: 61
End: 2021-04-01
Payer: COMMERCIAL

## 2021-04-01 VITALS
TEMPERATURE: 96.8 F | DIASTOLIC BLOOD PRESSURE: 80 MMHG | SYSTOLIC BLOOD PRESSURE: 142 MMHG | HEIGHT: 64 IN | RESPIRATION RATE: 16 BRPM | WEIGHT: 196 LBS | BODY MASS INDEX: 33.46 KG/M2 | HEART RATE: 52 BPM

## 2021-04-01 DIAGNOSIS — M54.12 RADICULOPATHY, CERVICAL: ICD-10-CM

## 2021-04-01 DIAGNOSIS — G52.8 ACCESSORY NERVE DISORDER: ICD-10-CM

## 2021-04-01 DIAGNOSIS — M54.2 CERVICALGIA: Primary | ICD-10-CM

## 2021-04-01 DIAGNOSIS — M48.02 DEGENERATIVE CERVICAL SPINAL STENOSIS: ICD-10-CM

## 2021-04-01 PROCEDURE — 99203 OFFICE O/P NEW LOW 30 MIN: CPT | Performed by: NEUROLOGICAL SURGERY

## 2021-04-01 PROCEDURE — 3008F BODY MASS INDEX DOCD: CPT | Performed by: FAMILY MEDICINE

## 2021-04-01 NOTE — PROGRESS NOTES
Assessment/Plan:    No problem-specific Assessment & Plan notes found for this encounter  Problem List Items Addressed This Visit     None      Visit Diagnoses     Cervicalgia    -  Primary    Relevant Orders    MRI cervical spine without contrast    Accessory nerve disorder        Degenerative cervical spinal stenosis        Relevant Orders    MRI cervical spine without contrast    Radiculopathy, cervical        Relevant Orders    MRI cervical spine without contrast            Subjective:      Patient ID: Mike Moya is a 62 y o  male  HPI    The following portions of the patient's history were reviewed and updated as appropriate:   He  has a past medical history of CAD (coronary artery disease) (2009), GERD (gastroesophageal reflux disease), Hyperlipidemia, Hypertension, Obesity, and Schatzki's ring (02/07/2014)  He   Patient Active Problem List    Diagnosis Date Noted    Chronic right shoulder pain 02/25/2021    Obesity (BMI 30 0-34 9) 08/14/2014    Gastroesophageal reflux disease without esophagitis 02/06/2014    Coronary arteriosclerosis in native artery 02/04/2014    Hyperlipidemia LDL goal <100 02/04/2014    Essential hypertension 02/04/2014     He  has a past surgical history that includes Colonoscopy (10/13/2011); Esophagogastroduodenoscopy; and Cardiac catheterization (2009, 2018)  His family history includes Arrhythmia in his father; Cancer in his maternal aunt; Coronary artery disease in his father; Heart attack (age of onset: 40) in his father  He  reports that he has never smoked  He has never used smokeless tobacco  He reports current alcohol use  He reports that he does not use drugs    Current Outpatient Medications   Medication Sig Dispense Refill    aspirin 81 MG tablet Take by mouth daily       Coenzyme Q10 (CO Q 10) 10 MG CAPS Take by mouth daily       glucosamine-chondroitin 500-400 MG tablet Take 1 tablet by mouth 3 (three) times a day      meloxicam (MOBIC) 15 mg tablet Take 1 tablet (15 mg total) by mouth daily 30 tablet 0    Misc Natural Products (TART CHERRY ADVANCED PO) Take by mouth      multivitamin (THERAGRAN) TABS Take 1 tablet by mouth daily      nebivolol (BYSTOLIC) 5 mg tablet Take 1 tablet (5 mg total) by mouth daily 90 tablet 3    Omega-3 Fatty Acids ([employer] FISH OIL) 1200 MG CAPS [employer]S Fish Oil 1200 MG Oral Capsule; daily      rosuvastatin (CRESTOR) 20 MG tablet Take 1 tablet (20 mg total) by mouth daily 90 tablet 3    TURMERIC PO Take by mouth       No current facility-administered medications for this visit  Current Outpatient Medications on File Prior to Visit   Medication Sig    aspirin 81 MG tablet Take by mouth daily     Coenzyme Q10 (CO Q 10) 10 MG CAPS Take by mouth daily     glucosamine-chondroitin 500-400 MG tablet Take 1 tablet by mouth 3 (three) times a day    meloxicam (MOBIC) 15 mg tablet Take 1 tablet (15 mg total) by mouth daily    Misc Natural Products (TART CHERRY ADVANCED PO) Take by mouth    multivitamin (THERAGRAN) TABS Take 1 tablet by mouth daily    nebivolol (BYSTOLIC) 5 mg tablet Take 1 tablet (5 mg total) by mouth daily    Omega-3 Fatty Acids ([employer] FISH OIL) 1200 MG CAPS [employer] Fish Oil 1200 MG Oral Capsule; daily    rosuvastatin (CRESTOR) 20 MG tablet Take 1 tablet (20 mg total) by mouth daily    TURMERIC PO Take by mouth     No current facility-administered medications on file prior to visit  He is allergic to dilaudid [hydromorphone hcl]       Review of Systems   Constitutional: Negative  HENT: Negative  Eyes: Negative  Respiratory: Negative  Cardiovascular: Negative  Gastrointestinal: Negative  Genitourinary: Negative  Musculoskeletal: Positive for neck stiffness (1 year tightness in shoulders  Pt is not currently in pain  Has some soreness and aching with overuse of right arm)  Negative for neck pain (Right shoulder )          Saw Dr Alejandra Gates regarding right shouder    Had EMG completed     PT: YES, x1, started Monday    MIRNA, NONE, the pt denies significant pain    Allergic/Immunologic: Negative  Neurological: Negative for weakness and numbness  Hematological: Bruises/bleeds easily (ASA 81 mg)  Hx: Cardiac Catherization     Psychiatric/Behavioral: Negative  Objective: There were no vitals taken for this visit  Physical Exam      I have personally obtain history and examined patient  I have personally reviewed case including all pertinent investigations/studies  Time spent 60 minutes  More than 50% of total time spent on counseling and coordination of care as described above including patient education, discussion of risks and rationale of conservative vs surgical treatment options  HPI    Chronic low grade neck pain with 1 yr exacerbation, associated with right shoulder/upper arm pain worse with activity/throwing ball  Full orthopaedic lo w Dr Tony Velasco without apparent cause  Denies weakness, loss of fine motor, gait, bowel or bladder issues  Recent PT without benefit  Xray and EMG completed    Exam    Mild restriction in cervical ROM  Moderate paravertebral spasm  Weakly positive spurling's right side  Full strength bilateral UE and LE  Intact sensation  Intact DTR  Intact fine motor and coordination  Normal gait   Normal tandem                        Back:     Symmetric, no curvature, ROM normal, no CVA tenderness       Chest wall:    No tenderness or deformity                   Extremities:   Extremities normal, atraumatic, no cyanosis or edema       Skin:   Skin color, texture, turgor normal, no rashes or lesions     Radiology    Xray    Severe C4-7 cervical disc degeneration resulting in moderate collapse, grossly stable spine of flex/ext     EMG    Chronic right accessory nerve denervation    Summary and Plan    Mr Evert Sutherland has acute on chronic neck pain and likely radiculopathy in the setting of advanced multilevel spondylosis   Today we reviewed the conservative options including PT, Danyell and medication  I encouraged him to continue with PT and ordered an xray  I will see him in fu in 6 weeks

## 2021-04-02 ENCOUNTER — APPOINTMENT (OUTPATIENT)
Dept: PHYSICAL THERAPY | Facility: CLINIC | Age: 61
End: 2021-04-02
Payer: COMMERCIAL

## 2021-04-05 ENCOUNTER — OFFICE VISIT (OUTPATIENT)
Dept: PHYSICAL THERAPY | Facility: CLINIC | Age: 61
End: 2021-04-05
Payer: COMMERCIAL

## 2021-04-05 DIAGNOSIS — G52.8 ACCESSORY NERVE DISORDER: ICD-10-CM

## 2021-04-05 DIAGNOSIS — M75.41 SUBACROMIAL IMPINGEMENT OF RIGHT SHOULDER: ICD-10-CM

## 2021-04-05 DIAGNOSIS — M62.58 MUSCLE ATROPHY OF UPPER EXTREMITY: ICD-10-CM

## 2021-04-05 DIAGNOSIS — M50.30 DEGENERATIVE DISC DISEASE, CERVICAL: ICD-10-CM

## 2021-04-05 DIAGNOSIS — G25.89 SCAPULAR DYSKINESIS: ICD-10-CM

## 2021-04-05 DIAGNOSIS — M47.812 FACET ARTHROPATHY, CERVICAL: Primary | ICD-10-CM

## 2021-04-05 PROCEDURE — 97112 NEUROMUSCULAR REEDUCATION: CPT | Performed by: PHYSICAL THERAPIST

## 2021-04-05 PROCEDURE — 97110 THERAPEUTIC EXERCISES: CPT | Performed by: PHYSICAL THERAPIST

## 2021-04-05 NOTE — PROGRESS NOTES
Daily Note     Today's date: 2021  Patient name: Noé Vargas  : 1960  MRN: [id number]  Referring provider: Brooklyn Mckinney DO  Dx:   Encounter Diagnosis     ICD-10-CM    1  Facet arthropathy, cervical  M47 812    2  Degenerative disc disease, cervical  M50 30    3  Subacromial impingement of right shoulder  M75 41    4  Muscle atrophy of upper extremity  M62 58    5  Scapular dyskinesis  G25 89                   Subjective: Patient states he's feeling "about the same", but has been doing his exercises  States he saw neurosurgery last week who wants him to continue with PT and has ordered an MRI  Objective: See treatment diary below      Assessment: Tolerated treatment well  Patient does well with progression of exercises  He is challenged with scapular wall clocks and will benefit from continued progression of scapular strengthening  He has no complaints post session  Patient would benefit from continued PT      Plan: Continue per plan of care        Precautions: HTN, high cholesterol       Manuals 3/29 4/5                                                               Neuro Re-Ed             Pt Edu- posture KS            PSR x20 x10 (between TE)           scap retraction  x20  x10 (between TE)            Thoracic extension 3"x20  3"x20            Scap wall clocks NV Red x 10 ea           Scap wall slides w foam roller  2x10            Prone retraction w row  5# 2x10            Prone retraction w hor abd  2# 2x10            Prone retraction w extension   2# 2x10            Prone scap retraction/extension (4 steps) B  x20                         Ther Ex             TB Row/Ext Red 2x10 ea Red 2x10 ea            Shoulder shrugs NV 5# 2x10            RetroUBE  6 min                                                                             Ther Activity                                       Gait Training                                       Modalities

## 2021-04-09 ENCOUNTER — OFFICE VISIT (OUTPATIENT)
Dept: PHYSICAL THERAPY | Facility: CLINIC | Age: 61
End: 2021-04-09
Payer: COMMERCIAL

## 2021-04-09 DIAGNOSIS — M75.41 SUBACROMIAL IMPINGEMENT OF RIGHT SHOULDER: ICD-10-CM

## 2021-04-09 DIAGNOSIS — G52.8 ACCESSORY NERVE DISORDER: ICD-10-CM

## 2021-04-09 DIAGNOSIS — M62.58 MUSCLE ATROPHY OF UPPER EXTREMITY: ICD-10-CM

## 2021-04-09 DIAGNOSIS — M47.812 FACET ARTHROPATHY, CERVICAL: Primary | ICD-10-CM

## 2021-04-09 DIAGNOSIS — G25.89 SCAPULAR DYSKINESIS: ICD-10-CM

## 2021-04-09 DIAGNOSIS — M50.30 DEGENERATIVE DISC DISEASE, CERVICAL: ICD-10-CM

## 2021-04-09 PROCEDURE — 97110 THERAPEUTIC EXERCISES: CPT

## 2021-04-09 PROCEDURE — 97112 NEUROMUSCULAR REEDUCATION: CPT

## 2021-04-09 NOTE — PROGRESS NOTES
Daily Note     Today's date: 2021  Patient name: Sera Campos  : 1960  MRN: [id number]  Referring provider: Annika Morfin DO  Dx:   Encounter Diagnosis     ICD-10-CM    1  Facet arthropathy, cervical  M47 812    2  Degenerative disc disease, cervical  M50 30    3  Subacromial impingement of right shoulder  M75 41    4  Muscle atrophy of upper extremity  M62 58    5  Scapular dyskinesis  G25 89    6  Accessory nerve disorder  G52 8                   Subjective: patient stated that his shoulder is still feeling the same, some "clicking" at sholderblade  Reports adherence to HEP      Objective: See treatment diary below      Assessment: Tolerated treatment well, and is very motivated to complete PT  Most challenged with wall clock  Patient demonstrated good TE form throughout session  Good tolerance to TE  TC/VC to correct UT compensation for all prone tasks, with fair carryover  Patient would benefit from continued PT  Plan: Continue per plan of care         Precautions: HTN, high cholesterol       Manuals 3/29 4/5 4/9                                                              Neuro Re-Ed             Pt Edu- posture KS            PSR x20 x10 (between TE) 10x          scap retraction  x20  x10 (between TE)  10x          Thoracic extension 3"x20  3"x20  3"x20          Scap wall clocks NV Red x 10 ea Red x 10 ea          Scap wall slides w foam roller  2x10  2x10          Prone retraction w row  5# 2x10  5# 2x10          Prone retraction w hor abd  2# 2x10  2# 2x10          Prone retraction w extension   2# 2x10  2# 2x10          Prone scap retraction/extension (4 steps) B  x20  20x 1#          Rhythmic stab    3x20"          Ther Ex             TB Row/Ext Red 2x10 ea Red 2x10 ea  Red 2x10 ea           Shoulder shrugs NV 5# 2x10  5# 2x10           RetroUBE  6 min  6 min          TB IR/ER   RTB 2x10          Body blade   3x20" IR/ER                                                 Ther Activity Gait Training                                       Modalities

## 2021-04-12 ENCOUNTER — OFFICE VISIT (OUTPATIENT)
Dept: PHYSICAL THERAPY | Facility: CLINIC | Age: 61
End: 2021-04-12
Payer: COMMERCIAL

## 2021-04-12 DIAGNOSIS — G52.8 ACCESSORY NERVE DISORDER: ICD-10-CM

## 2021-04-12 DIAGNOSIS — M75.41 SUBACROMIAL IMPINGEMENT OF RIGHT SHOULDER: ICD-10-CM

## 2021-04-12 DIAGNOSIS — M62.58 MUSCLE ATROPHY OF UPPER EXTREMITY: ICD-10-CM

## 2021-04-12 DIAGNOSIS — G25.89 SCAPULAR DYSKINESIS: ICD-10-CM

## 2021-04-12 DIAGNOSIS — M47.812 FACET ARTHROPATHY, CERVICAL: Primary | ICD-10-CM

## 2021-04-12 DIAGNOSIS — M50.30 DEGENERATIVE DISC DISEASE, CERVICAL: ICD-10-CM

## 2021-04-12 PROCEDURE — 97112 NEUROMUSCULAR REEDUCATION: CPT | Performed by: PHYSICAL THERAPIST

## 2021-04-12 PROCEDURE — 97110 THERAPEUTIC EXERCISES: CPT | Performed by: PHYSICAL THERAPIST

## 2021-04-12 NOTE — PROGRESS NOTES
Daily Note     Today's date: 2021  Patient name: Vianney Posada  : 1960  MRN: [id number]  Referring provider: Papo Ortiz DO  Dx:   Encounter Diagnosis     ICD-10-CM    1  Facet arthropathy, cervical  M47 812    2  Degenerative disc disease, cervical  M50 30    3  Subacromial impingement of right shoulder  M75 41    4  Muscle atrophy of upper extremity  M62 58    5  Scapular dyskinesis  G25 89    6  Accessory nerve disorder  G52 8                   Subjective: "I think I'm standing up a little straighter  I don't have as much shoulder droop as I did before "       Objective: See treatment diary below      Assessment:  Patient does well with session, exhibiting improved scapular retraction ability and less upper trap compensatory patterns  Patient provided updated written HEP  No complaints post session  Continue with skilled PT and HEP  Plan: Continue per plan of care         Precautions: HTN, high cholesterol       Manuals 3/29 4/5 4/9 4/12                                                             Neuro Re-Ed             Pt Edu- posture KS            PSR x20 x10 (between TE) 10x PRN         scap retraction  x20  x10 (between TE)  10x PRN         Thoracic extension 3"x20  3"x20  3"x20          Scap wall clocks NV Red x 10 ea Red x 10 ea          Scap wall slides w foam roller  2x10  2x10          Prone retraction w row  5# 2x10  5# 2x10 2# 2x10         Prone retraction w hor abd  2# 2x10  2# 2x10 2# 2x10         Prone retraction w extension   2# 2x10  2# 2x10 2# 2x10         Prone scap retraction/extension (4 steps) B  x20  20x 1# 1# x20          Rhythmic stab    3x20" 2x30"          TB Chest Pull & ER    Red 2x10 ea         Ther Ex             TB Row/Ext Red 2x10 ea Red 2x10 ea  Red 2x10 ea  Green 2x10 ea         Shoulder shrugs NV 5# 2x10  5# 2x10  10# 3x10          RetroUBE  6 min  6 min 6 min          TB IR/ER   RTB 2x10 Red 2x10 ea         Body blade- at side   3x20" IR/ER 3x20" ea IR/ER & up/down                                                Ther Activity                                       Gait Training                                       Modalities

## 2021-04-15 ENCOUNTER — IMMUNIZATIONS (OUTPATIENT)
Dept: FAMILY MEDICINE CLINIC | Facility: HOSPITAL | Age: 61
End: 2021-04-15

## 2021-04-15 DIAGNOSIS — Z23 ENCOUNTER FOR IMMUNIZATION: Primary | ICD-10-CM

## 2021-04-15 PROCEDURE — 0012A SARS-COV-2 / COVID-19 MRNA VACCINE (MODERNA) 100 MCG: CPT

## 2021-04-15 PROCEDURE — 91301 SARS-COV-2 / COVID-19 MRNA VACCINE (MODERNA) 100 MCG: CPT

## 2021-04-16 ENCOUNTER — APPOINTMENT (OUTPATIENT)
Dept: PHYSICAL THERAPY | Facility: CLINIC | Age: 61
End: 2021-04-16
Payer: COMMERCIAL

## 2021-04-19 ENCOUNTER — OFFICE VISIT (OUTPATIENT)
Dept: PHYSICAL THERAPY | Facility: CLINIC | Age: 61
End: 2021-04-19
Payer: COMMERCIAL

## 2021-04-19 DIAGNOSIS — G25.89 SCAPULAR DYSKINESIS: ICD-10-CM

## 2021-04-19 DIAGNOSIS — G52.8 ACCESSORY NERVE DISORDER: ICD-10-CM

## 2021-04-19 DIAGNOSIS — M47.812 FACET ARTHROPATHY, CERVICAL: Primary | ICD-10-CM

## 2021-04-19 DIAGNOSIS — M50.30 DEGENERATIVE DISC DISEASE, CERVICAL: ICD-10-CM

## 2021-04-19 DIAGNOSIS — M75.41 SUBACROMIAL IMPINGEMENT OF RIGHT SHOULDER: ICD-10-CM

## 2021-04-19 DIAGNOSIS — M62.58 MUSCLE ATROPHY OF UPPER EXTREMITY: ICD-10-CM

## 2021-04-19 PROCEDURE — 97110 THERAPEUTIC EXERCISES: CPT | Performed by: PHYSICAL THERAPIST

## 2021-04-19 PROCEDURE — 97112 NEUROMUSCULAR REEDUCATION: CPT | Performed by: PHYSICAL THERAPIST

## 2021-04-19 NOTE — PROGRESS NOTES
Daily Note     Today's date: 2021  Patient name: Shady Boggs  : 1960  MRN: [id number]  Referring provider: Sydney Weathers DO  Dx:   Encounter Diagnosis     ICD-10-CM    1  Facet arthropathy, cervical  M47 812    2  Degenerative disc disease, cervical  M50 30    3  Subacromial impingement of right shoulder  M75 41    4  Muscle atrophy of upper extremity  M62 58    5  Scapular dyskinesis  G25 89    6  Accessory nerve disorder  G52 8                   Subjective: Patient states he's been doing his exercises  However, he has been avoiding heavy activities for his shoulder  Objective: See treatment diary below      Assessment:  Updated FOTO this visit, which had been set up for cervical region  Despite cervical diagnosis, he does not have difficulty within cervical region  Therefore, updated FOTO for shoulder to be completed next visit  He continues with program with difficulty with scapular retraction/protraction in closed chain; easier performance of this in open chain  He leaves session without complaint  Plan: Continue per plan of care         Precautions: HTN, high cholesterol       Manuals 3/29 4/5 4/9 4/12 4/19                                                            Neuro Re-Ed             Pt Edu- posture KS            PSR x20 x10 (between TE) 10x PRN x20        scap retraction  x20  x10 (between TE)  10x PRN x20         Thoracic extension 3"x20  3"x20  3"x20  x20         Scap wall clocks NV Red x 10 ea Red x 10 ea  HEP        Scap wall slides w foam roller  2x10  2x10  2x10         Prone retraction w row  5# 2x10  5# 2x10 2# 2x10 HEP        Prone retraction w hor abd  2# 2x10  2# 2x10 2# 2x10 HEP        Prone retraction w extension   2# 2x10  2# 2x10 2# 2x10 HEP        Prone scap retraction/extension (4 steps) B  x20  20x 1# 1# x20  HEP        Rhythmic stab    3x20" 2x30"  3x30"         TB Chest Pull & ER    Red 2x10 ea Red 2x15 ea         Wall protraction/ retraction      x20 Supine serratus punch     5# x10; 10# x15        Ther Ex             TB Row/Ext Red 2x10 ea Red 2x10 ea  Red 2x10 ea  Green 2x10 ea Green 2x15 ea         Shoulder shrugs NV 5# 2x10  5# 2x10  10# 3x10  10# x10; 15# 2x10         RetroUBE  6 min  6 min 6 min  6 min         TB IR/ER   RTB 2x10 Red 2x10 ea Green 2x10 ea        Body blade- at side   3x20" IR/ER 3x20" ea IR/ER & up/down IR/ER & up/down @ 0 deg & 90* hor abd                                                Ther Activity                                       Gait Training                                       Modalities

## 2021-04-23 ENCOUNTER — OFFICE VISIT (OUTPATIENT)
Dept: PHYSICAL THERAPY | Facility: CLINIC | Age: 61
End: 2021-04-23
Payer: COMMERCIAL

## 2021-04-23 DIAGNOSIS — M75.41 SUBACROMIAL IMPINGEMENT OF RIGHT SHOULDER: ICD-10-CM

## 2021-04-23 DIAGNOSIS — G25.89 SCAPULAR DYSKINESIS: ICD-10-CM

## 2021-04-23 DIAGNOSIS — M62.58 MUSCLE ATROPHY OF UPPER EXTREMITY: ICD-10-CM

## 2021-04-23 DIAGNOSIS — M50.30 DEGENERATIVE DISC DISEASE, CERVICAL: ICD-10-CM

## 2021-04-23 DIAGNOSIS — G52.8 ACCESSORY NERVE DISORDER: ICD-10-CM

## 2021-04-23 DIAGNOSIS — M47.812 FACET ARTHROPATHY, CERVICAL: Primary | ICD-10-CM

## 2021-04-23 PROCEDURE — 97112 NEUROMUSCULAR REEDUCATION: CPT | Performed by: PHYSICAL THERAPIST

## 2021-04-23 PROCEDURE — 97110 THERAPEUTIC EXERCISES: CPT | Performed by: PHYSICAL THERAPIST

## 2021-04-23 NOTE — PROGRESS NOTES
PT Re-Evaluation     Today's date: 2021  Patient name: Ace Cardenas  : 1960  MRN: [id number]  Referring provider: Jeanette Mchugh DO  Dx:   Encounter Diagnosis     ICD-10-CM    1  Facet arthropathy, cervical  M47 812    2  Degenerative disc disease, cervical  M50 30    3  Subacromial impingement of right shoulder  M75 41    4  Muscle atrophy of upper extremity  M62 58    5  Scapular dyskinesis  G25 89    6  Accessory nerve disorder  G52 8                   Assessment  Assessment details: Patient is a 63 y/o male with chief complaints of right shoulder pain that began at least several months ago  Since starting therapy, he presents with less pain, improved strength, improved shoulder ROM  He is nearly comparable in strength and ROM from left to right  He exhibits mild strength and ROM limitations and will benefit from continued PT at a frequency of 1 time per week  He is encouraged to return to his normal ADLs and may return to heavier ADLs through painfree range  Patient in agreement with plan  He will return for follow up with ortho and neuro, as scheduled  Impairments: abnormal or restricted ROM, activity intolerance, impaired physical strength, lacks appropriate home exercise program and pain with function  Understanding of Dx/Px/POC: good   Prognosis: good    Goals  STG within 4 weeks:   1  Patient to be independent in HEP  MET  2  Reduce pain by 50% to improve quality of life  MET  3  Improve shoulder strength to 4+  MET  LTG within 8 weeks:   1  Patient to be independent in ADLs/IADLs without difficulty  2  Achieve full AROM without pain  3  Patient to be able to perform overhead tasks without pain       Plan  Patient would benefit from: skilled physical therapy and PT eval  Planned modality interventions: cryotherapy, hydrotherapy and unattended electrical stimulation  Planned therapy interventions: therapeutic training, therapeutic exercise, therapeutic activities, stretching, strengthening, postural training, patient education, neuromuscular re-education, manual therapy, joint mobilization, IADL retraining, activity modification, ADL retraining, ADL training, body mechanics training, flexibility, functional ROM exercises, gait training, graded activity, graded exercise, graded motor and home exercise program  Frequency: 2x week  Duration in weeks: 5  Plan of Care beginning date: 2021  Plan of Care expiration date: 2021  Treatment plan discussed with: patient        Subjective Evaluation    History of Present Illness  Mechanism of injury: Patient is a 63 y/o RHD male with chief complaints of right shoulder pain that he noticed at least several months ago  Last August, patient retired and was doing more physical work  That's when he started noticing more shoulder discomfort and difficulty with certain movements  He was seen by ortho and referred for PT  Cervical xray shows: No fracture  Normal alignment without subluxation  Moderate degenerative disc space narrowing at C4-C5, C5-C6, and C6-C7  Mild left C3-C4 and C4-C5 neural foramen narrowing  Mild right C4-C5 neural foramen narrowing  No evidence of dynamic instability seen with flexion or extension  Shoulder xray: normal      21: Update:  Patient states he's getting some pinching within shoulder at end range flexion/abduction  He also can "feel" right arm more fatigued during exercises    Overall, he hasn't tried his more difficult/heavy ADLs recently and has not tried to throw a ball         Pain  Current pain ratin  At best pain ratin  At worst pain ratin  Quality: burning  Relieving factors: medications  Progression: no change    Social Support    Employment status: not working (retired )  Hand dominance: right  Exercise history: None   Life stress: low      Diagnostic Tests  Abnormal x-ray: Severe C4-7 cervical disc degeneration resulting in moderate collapse, grossly stable spine of flex/ext Abnormal EMG results: Chronic right accessory nerve denervation  Treatments  No previous or current treatments  Patient Goals  Patient goal: "to be able to use my arm properly"         Objective     Concurrent Complaints  Negative for night pain, disturbed sleep, dizziness, faints, headaches, nausea/motion sickness, tinnitus, trouble swallowing, difficulty breathing, shortness of breath, respiratory pain and visual change    Cervical/Thoracic Screen   Cervical range of motion within normal limits with the following exceptions: No pain with AROM or overpressure of cervical spine     Active Range of Motion   Cervical/Thoracic Spine       Cervical    Flexion:  WFL  Extension:  Restriction level: minimal  Left lateral flexion:  WFL  Right lateral flexion:  Restriction level minimal  Left rotation:  WFL  Left Shoulder   Flexion: 160 degrees   Extension: 63 degrees   Abduction: 160 degrees   External rotation 0°: 90 degrees   Internal rotation BTB: T7     Right Shoulder   Flexion: 155 degrees   Extension: 58 degrees   Abduction: 155 degrees   External rotation 0°: 80 degrees   Internal rotation BTB: T10     Strength/Myotome Testing     Left Shoulder     Planes of Motion   Flexion: 5   Abduction: 5   External rotation at 0°: 5   Internal rotation at 0°: 5     Right Shoulder     Planes of Motion   Flexion: 4+   Abduction: 4+   External rotation at 0°: 5   Internal rotation at 0°: 5     Additional Strength Details  Prone Hor abd: B: 5/5   Prone Scaption: B: 4+/5   Prone Extension: B: 5/5    General Comments:      Shoulder Comments   Forward shoulder/forward head   Lower right shoulder (much improved from IE)  Adequate scapular mobility with overhead motion   Neuro Exam:     Headaches   Patient reports headaches: No               Precautions: HTN, high cholesterol       Increased time spent on re-evaluation and patient education     Manuals 3/29 4/5 4/9 4/12 4/19 4/23       Re-eval      KS Neuro Re-Ed             Pt Edu- posture KS            PSR x20 x10 (between TE) 10x PRN x20        scap retraction  x20  x10 (between TE)  10x PRN x20         Thoracic extension 3"x20  3"x20  3"x20  x20         Scap wall clocks NV Red x 10 ea Red x 10 ea  HEP        Scap wall slides w foam roller  2x10  2x10  2x10  3x10       Prone retraction w row  5# 2x10  5# 2x10 2# 2x10 HEP        Prone retraction w hor abd  2# 2x10  2# 2x10 2# 2x10 HEP        Prone retraction w extension   2# 2x10  2# 2x10 2# 2x10 HEP        Prone scap retraction/extension (4 steps) B  x20  20x 1# 1# x20  HEP                     Prone scaption      2x10        Rhythmic stab    3x20" 2x30"  3x30"  2x30" ea (flex & IR/ER)       TB Chest Pull & ER    Red 2x10 ea Red 2x15 ea         Wall protraction/ retraction      x20         Supine serratus punch     5# x10; 10# x15        Rebounder toss      Green 3x10        Ball roll on wall       x30 cw&ccw        Ther Ex             TB Row/Ext Red 2x10 ea Red 2x10 ea  Red 2x10 ea  Green 2x10 ea Green 2x15 ea  Green 2x15 ea        Shoulder shrugs NV 5# 2x10  5# 2x10  10# 3x10  10# x10; 15# 2x10  15# 3x10        RetroUBE  6 min  6 min 6 min  6 min  Fwd/bwd 4' ea       TB IR/ER   RTB 2x10 Red 2x10 ea Green 2x10 ea Green 2x10 ea       Body blade- at side   3x20" IR/ER 3x20" ea IR/ER & up/down IR/ER & up/down @ 0 deg & 90* hor abd  R/ER & up/down @ 0 deg & 90* hor abd 2x30" ea                                              Ther Activity                                       Gait Training                                       Modalities

## 2021-04-26 ENCOUNTER — OFFICE VISIT (OUTPATIENT)
Dept: PHYSICAL THERAPY | Facility: CLINIC | Age: 61
End: 2021-04-26
Payer: COMMERCIAL

## 2021-04-26 DIAGNOSIS — M62.58 MUSCLE ATROPHY OF UPPER EXTREMITY: ICD-10-CM

## 2021-04-26 DIAGNOSIS — G52.8 ACCESSORY NERVE DISORDER: ICD-10-CM

## 2021-04-26 DIAGNOSIS — M75.41 SUBACROMIAL IMPINGEMENT OF RIGHT SHOULDER: ICD-10-CM

## 2021-04-26 DIAGNOSIS — G25.89 SCAPULAR DYSKINESIS: ICD-10-CM

## 2021-04-26 DIAGNOSIS — M50.30 DEGENERATIVE DISC DISEASE, CERVICAL: ICD-10-CM

## 2021-04-26 DIAGNOSIS — M47.812 FACET ARTHROPATHY, CERVICAL: Primary | ICD-10-CM

## 2021-04-26 PROCEDURE — 97110 THERAPEUTIC EXERCISES: CPT | Performed by: PHYSICAL THERAPIST

## 2021-04-26 PROCEDURE — 97112 NEUROMUSCULAR REEDUCATION: CPT | Performed by: PHYSICAL THERAPIST

## 2021-04-26 NOTE — PROGRESS NOTES
Daily Note     Today's date: 2021  Patient name: Juan Diego Mayo  : 1960  MRN: [id number]  Referring provider: Yeyo Jason DO  Dx:   Encounter Diagnosis     ICD-10-CM    1  Facet arthropathy, cervical  M47 812    2  Degenerative disc disease, cervical  M50 30    3  Subacromial impingement of right shoulder  M75 41    4  Muscle atrophy of upper extremity  M62 58    5  Scapular dyskinesis  G25 89    6  Accessory nerve disorder  G52 8                   Subjective: Patient has nothing new to report  Objective: See treatment diary below      Assessment: Tolerated treatment well  Patient has no complaints of pain during or post session  He requires verbal cueing for correct exercise technique  Improved strength and stability noted within shoulder complex and improved appropriate muscular response noted with several exercises  Patient would benefit from continued PT      Plan: Continue per plan of care  1 time per week  Patient to follow up with neuro on 21  Precautions: HTN, high cholesterol       Increased time spent on re-evaluation and patient education     Manuals 3/29 4/5 4/9 4/12 4/19 4/23 4/26      Re-eval      KS       Gr 2-3 Post/Inf GHJ mobs       5'                                Neuro Re-Ed             Pt Edu- posture KS            PSR x20 x10 (between TE) 10x PRN x20        scap retraction  x20  x10 (between TE)  10x PRN x20         Thoracic extension 3"x20  3"x20  3"x20  x20         Scap wall clocks NV Red x 10 ea Red x 10 ea  HEP  HEP      Scap wall slides w foam roller  2x10  2x10  2x10  3x10 3x10      Prone retraction w row  5# 2x10  5# 2x10 2# 2x10 HEP  HEP      Prone retraction w hor abd  2# 2x10  2# 2x10 2# 2x10 HEP  HEP      Prone retraction w extension   2# 2x10  2# 2x10 2# 2x10 HEP  HEP      Prone scap retraction/extension (4 steps) B  x20  20x 1# 1# x20  HEP  HEP                   Prone scaption      2x10  2x10       Rhythmic stab    3x20" 2x30"  3x30"  2x30" ea (flex & IR/ER) 2x30" ea      TB Chest Pull & ER    Red 2x10 ea Red 2x15 ea         Wall protraction/ retraction      x20   x20       Supine serratus punch     5# x10; 10# x15  10# 2x15      Rebounder toss      Green 3x10  Green 3x10       Ball roll on wall       x30 cw&ccw  Green x30 cw/ccw      Ther Ex             TB Row/Ext Red 2x10 ea Red 2x10 ea  Red 2x10 ea  Green 2x10 ea Green 2x15 ea  Green 2x15 ea  Green 3x10       Shoulder shrugs NV 5# 2x10  5# 2x10  10# 3x10  10# x10; 15# 2x10  15# 3x10  15# 3x10       RetroUBE  6 min  6 min 6 min  6 min  Fwd/bwd 4' ea Fwd/bwd 4' ea      TB IR/ER   RTB 2x10 Red 2x10 ea Green 2x10 ea Green 2x10 ea Green 2x10 ea      Body blade- at side   3x20" IR/ER 3x20" ea IR/ER & up/down IR/ER & up/down @ 0 deg & 90* hor abd  IR/ER & up/down @ 0 deg & 90* hor abd 2x30" ea IR/ER & up/down @ 0 deg & 90* hor abd 2x30" ea      Chest Press        10# 2x15                                 Ther Activity                                       Gait Training                                       Modalities

## 2021-04-29 DIAGNOSIS — I10 ESSENTIAL HYPERTENSION: Chronic | ICD-10-CM

## 2021-04-29 RX ORDER — NEBIVOLOL HYDROCHLORIDE 5 MG/1
TABLET ORAL
Qty: 90 TABLET | Refills: 3 | Status: SHIPPED | OUTPATIENT
Start: 2021-04-29 | End: 2022-04-25

## 2021-04-30 ENCOUNTER — APPOINTMENT (OUTPATIENT)
Dept: PHYSICAL THERAPY | Facility: CLINIC | Age: 61
End: 2021-04-30
Payer: COMMERCIAL

## 2021-05-01 ENCOUNTER — HOSPITAL ENCOUNTER (OUTPATIENT)
Dept: MRI IMAGING | Facility: HOSPITAL | Age: 61
Discharge: HOME | End: 2021-05-01
Attending: NEUROLOGICAL SURGERY
Payer: COMMERCIAL

## 2021-05-01 DIAGNOSIS — M54.2 CERVICALGIA: ICD-10-CM

## 2021-05-01 DIAGNOSIS — M54.12 RADICULOPATHY, CERVICAL: ICD-10-CM

## 2021-05-01 DIAGNOSIS — M48.02 DEGENERATIVE CERVICAL SPINAL STENOSIS: ICD-10-CM

## 2021-05-01 PROCEDURE — G1004 CDSM NDSC: HCPCS

## 2021-05-01 PROCEDURE — 72141 MRI NECK SPINE W/O DYE: CPT

## 2021-05-03 ENCOUNTER — OFFICE VISIT (OUTPATIENT)
Dept: PHYSICAL THERAPY | Facility: CLINIC | Age: 61
End: 2021-05-03
Payer: COMMERCIAL

## 2021-05-03 DIAGNOSIS — G25.89 SCAPULAR DYSKINESIS: ICD-10-CM

## 2021-05-03 DIAGNOSIS — G52.8 ACCESSORY NERVE DISORDER: ICD-10-CM

## 2021-05-03 DIAGNOSIS — M62.58 MUSCLE ATROPHY OF UPPER EXTREMITY: ICD-10-CM

## 2021-05-03 DIAGNOSIS — M75.41 SUBACROMIAL IMPINGEMENT OF RIGHT SHOULDER: ICD-10-CM

## 2021-05-03 DIAGNOSIS — M50.30 DEGENERATIVE DISC DISEASE, CERVICAL: ICD-10-CM

## 2021-05-03 DIAGNOSIS — M47.812 FACET ARTHROPATHY, CERVICAL: Primary | ICD-10-CM

## 2021-05-03 PROCEDURE — 97140 MANUAL THERAPY 1/> REGIONS: CPT | Performed by: PHYSICAL THERAPIST

## 2021-05-03 PROCEDURE — 97112 NEUROMUSCULAR REEDUCATION: CPT | Performed by: PHYSICAL THERAPIST

## 2021-05-03 NOTE — PROGRESS NOTES
Daily Note     Today's date: 5/3/2021  Patient name: Juan Diego Mayo  : 1960  MRN: [id number]  Referring provider: Yeyo Jason DO  Dx:   Encounter Diagnosis     ICD-10-CM    1  Facet arthropathy, cervical  M47 812    2  Degenerative disc disease, cervical  M50 30    3  Subacromial impingement of right shoulder  M75 41    4  Muscle atrophy of upper extremity  M62 58    5  Scapular dyskinesis  G25 89    6  Accessory nerve disorder  G52 8                   Subjective: Patient states he's not having any pain today  Objective: See treatment diary below      Assessment: Tolerated treatment well  Mild soreness at anterior shoulder with prone scapular retraction  No pain with palpation at pec minor, biceps, SST  Subscap and TM/IST release both successful in reducing this feeling of soreness  Addition of hor add and lat stretch for HEP  Patient would benefit from continued PT      Plan: Continue per plan of care  1 time per week  Patient to follow up with neuro on 21  Precautions: HTN, high cholesterol       Increased time spent on further assessment of functional strength and patient education     Manuals 3/29 4/5 4/9 4/12 4/19 4/23 4/26 5/3     Re-eval      KS       Gr 2-3 Post/Inf GHJ mobs       5'      Functional strength assessment        KS     Subscap release        KS     TM/IST release        KS     Neuro Re-Ed             Pt Edu- posture KS            PSR x20 x10 (between TE) 10x PRN x20        scap retraction  x20  x10 (between TE)  10x PRN x20         Thoracic extension 3"x20  3"x20  3"x20  x20         Scap wall clocks NV Red x 10 ea Red x 10 ea  HEP  HEP      Scap wall slides w foam roller  2x10  2x10  2x10  3x10 3x10      Prone retraction w row  5# 2x10  5# 2x10 2# 2x10 HEP  HEP      Prone retraction w hor abd  2# 2x10  2# 2x10 2# 2x10 HEP  HEP Retrain (palm down/thumb up)      Prone retraction w extension   2# 2x10  2# 2x10 2# 2x10 HEP  HEP retrain     Prone scap retraction/extension (4 steps) B  x20  20x 1# 1# x20  HEP  HEP retrain                  Prone scaption      2x10  2x10  retrain     Rhythmic stab    3x20" 2x30"  3x30"  2x30" ea (flex & IR/ER) 2x30" ea      TB Chest Pull & ER    Red 2x10 ea Red 2x15 ea         Wall protraction/ retraction      x20   x20       Supine serratus punch     5# x10; 10# x15  10# 2x15 Seated at 120* manual resistance     Rebounder toss      Green 3x10  Green 3x10       Ball roll on wall       x30 cw&ccw  Green x30 cw/ccw      Ther Ex             TB Row/Ext Red 2x10 ea Red 2x10 ea  Red 2x10 ea  Green 2x10 ea Green 2x15 ea  Green 2x15 ea  Green 3x10  Blue for HEP     Shoulder shrugs NV 5# 2x10  5# 2x10  10# 3x10  10# x10; 15# 2x10  15# 3x10  15# 3x10       RetroUBE  6 min  6 min 6 min  6 min  Fwd/bwd 4' ea Fwd/bwd 4' ea Fwd/bwd 4' ea     TB IR/ER   RTB 2x10 Red 2x10 ea Green 2x10 ea Green 2x10 ea Green 2x10 ea      Body blade- at side   3x20" IR/ER 3x20" ea IR/ER & up/down IR/ER & up/down @ 0 deg & 90* hor abd  IR/ER & up/down @ 0 deg & 90* hor abd 2x30" ea IR/ER & up/down @ 0 deg & 90* hor abd 2x30" ea      Chest Press        10# 2x15                                 Ther Activity                                       Gait Training                                       Modalities

## 2021-05-07 ENCOUNTER — APPOINTMENT (OUTPATIENT)
Dept: PHYSICAL THERAPY | Facility: CLINIC | Age: 61
End: 2021-05-07
Payer: COMMERCIAL

## 2021-05-10 ENCOUNTER — OFFICE VISIT (OUTPATIENT)
Dept: PHYSICAL THERAPY | Facility: CLINIC | Age: 61
End: 2021-05-10
Payer: COMMERCIAL

## 2021-05-10 DIAGNOSIS — G25.89 SCAPULAR DYSKINESIS: ICD-10-CM

## 2021-05-10 DIAGNOSIS — M47.812 FACET ARTHROPATHY, CERVICAL: Primary | ICD-10-CM

## 2021-05-10 DIAGNOSIS — M50.30 DEGENERATIVE DISC DISEASE, CERVICAL: ICD-10-CM

## 2021-05-10 DIAGNOSIS — M62.58 MUSCLE ATROPHY OF UPPER EXTREMITY: ICD-10-CM

## 2021-05-10 DIAGNOSIS — M75.41 SUBACROMIAL IMPINGEMENT OF RIGHT SHOULDER: ICD-10-CM

## 2021-05-10 DIAGNOSIS — G52.8 ACCESSORY NERVE DISORDER: ICD-10-CM

## 2021-05-10 PROCEDURE — 97110 THERAPEUTIC EXERCISES: CPT | Performed by: PHYSICAL THERAPIST

## 2021-05-10 PROCEDURE — 97112 NEUROMUSCULAR REEDUCATION: CPT | Performed by: PHYSICAL THERAPIST

## 2021-05-10 NOTE — PROGRESS NOTES
Daily Note     Today's date: 5/10/2021  Patient name: Magan Swanson  : 1960  MRN: [id number]  Referring provider: Geeta Clark DO  Dx:   Encounter Diagnosis     ICD-10-CM    1  Facet arthropathy, cervical  M47 812    2  Degenerative disc disease, cervical  M50 30    3  Subacromial impingement of right shoulder  M75 41    4  Muscle atrophy of upper extremity  M62 58    5  Scapular dyskinesis  G25 89    6  Accessory nerve disorder  G52 8                   Subjective: Patient states he's only been having low level soreness on occasion  He hasn't noticed much difficulty with ADLs or home activities  States he had MRI last week and will be seeing neurosurgeon this Friday to get results  Objective: See treatment diary below      Assessment: Tolerated treatment well  No complaints of pain during or post session  No tenderness throughout IST/TM or subscap, so STR not performed this visit  Addition of TB IR/ER at 90 degrees to address throwing  Patient would benefit from continued PT      Plan: Continue per plan of care  1 time per week  Patient to follow up with neuro on 21        Precautions: HTN, high cholesterol         Manuals 3/29 4/5 4/9 4/12 4/19 4/23 4/26 5/3 5/10     Re-eval      KS       Gr 2-3 Post/Inf GHJ mobs       5'      Functional strength assessment        KS     Subscap release        KS No tenderness    TM/IST release        KS No tenderness    Neuro Re-Ed             Pt Edu- posture KS            PSR x20 x10 (between TE) 10x PRN x20        scap retraction  x20  x10 (between TE)  10x PRN x20         Thoracic extension 3"x20  3"x20  3"x20  x20         Scap wall clocks NV Red x 10 ea Red x 10 ea  HEP  HEP      Scap wall slides w foam roller  2x10  2x10  2x10  3x10 3x10      Prone retraction w row  5# 2x10  5# 2x10 2# 2x10 HEP  HEP      Prone retraction w hor abd  2# 2x10  2# 2x10 2# 2x10 HEP  HEP Retrain (palm down/thumb up)  2# x15 ea    Prone retraction w extension   2# 2x10 2# 2x10 2# 2x10 HEP  HEP retrain     Prone scap retraction/extension (4 steps) B  x20  20x 1# 1# x20  HEP  HEP retrain 2x10                 Prone scaption      2x10  2x10  retrain 2# 2x10     Rhythmic stab    3x20" 2x30"  3x30"  2x30" ea (flex & IR/ER) 2x30" ea  Supine at 90* 2x30" ea    TB Chest Pull & ER    Red 2x10 ea Red 2x15 ea         Wall protraction/ retraction      x20   x20       Supine serratus punch     5# x10; 10# x15  10# 2x15 Seated at 120* manual resistance 10# 2x10    Rebounder toss      Green 3x10  Green 3x10       Ball roll on wall       x30 cw&ccw  Green x30 cw/ccw      Ther Ex             TB Row/Ext Red 2x10 ea Red 2x10 ea  Red 2x10 ea  Green 2x10 ea Green 2x15 ea  Green 2x15 ea  Green 3x10  Blue for HEP     Shoulder shrugs NV 5# 2x10  5# 2x10  10# 3x10  10# x10; 15# 2x10  15# 3x10  15# 3x10       RetroUBE  6 min  6 min 6 min  6 min  Fwd/bwd 4' ea Fwd/bwd 4' ea Fwd/bwd 4' ea Fwd/bwd 4' ea    TB IR/ER   RTB 2x10 Red 2x10 ea Green 2x10 ea Green 2x10 ea Green 2x10 ea  At 90* red 2x10 ea    Body blade- at side   3x20" IR/ER 3x20" ea IR/ER & up/down IR/ER & up/down @ 0 deg & 90* hor abd  IR/ER & up/down @ 0 deg & 90* hor abd 2x30" ea IR/ER & up/down @ 0 deg & 90* hor abd 2x30" ea  IR/ER & up/down @ 0 deg & 90* hor abd 2x30" ea    Chest Press        10# 2x15   10# 3x10                              Ther Activity                                       Gait Training                                       Modalities

## 2021-05-14 ENCOUNTER — OFFICE VISIT (OUTPATIENT)
Dept: NEUROSURGERY | Facility: CLINIC | Age: 61
End: 2021-05-14
Payer: COMMERCIAL

## 2021-05-14 DIAGNOSIS — M54.2 CERVICALGIA: Primary | ICD-10-CM

## 2021-05-14 PROCEDURE — 1036F TOBACCO NON-USER: CPT | Performed by: NEUROLOGICAL SURGERY

## 2021-05-14 PROCEDURE — 99212 OFFICE O/P EST SF 10 MIN: CPT | Performed by: NEUROLOGICAL SURGERY

## 2021-05-14 NOTE — PROGRESS NOTES
Assessment/Plan:    No problem-specific Assessment & Plan notes found for this encounter  Problem List Items Addressed This Visit     None      Visit Diagnoses     Cervicalgia    -  Primary            Subjective:      Patient ID: Travis Nayak is a 62 y o  male  HPI    The following portions of the patient's history were reviewed and updated as appropriate:   He  has a past medical history of CAD (coronary artery disease) (2009), GERD (gastroesophageal reflux disease), Hyperlipidemia, Hypertension, Obesity, and Schatzki's ring (02/07/2014)  He   Patient Active Problem List    Diagnosis Date Noted    Chronic right shoulder pain 02/25/2021    Obesity (BMI 30 0-34 9) 08/14/2014    Gastroesophageal reflux disease without esophagitis 02/06/2014    Coronary arteriosclerosis in native artery 02/04/2014    Hyperlipidemia LDL goal <100 02/04/2014    Essential hypertension 02/04/2014     He  has a past surgical history that includes Colonoscopy (10/13/2011); Esophagogastroduodenoscopy; and Cardiac catheterization (2009, 2018)  His family history includes Arrhythmia in his father; Cancer in his maternal aunt; Coronary artery disease in his father; Heart attack (age of onset: 40) in his father  He  reports that he has never smoked  He has never used smokeless tobacco  He reports current alcohol use  He reports that he does not use drugs    Current Outpatient Medications   Medication Sig Dispense Refill    aspirin 81 MG tablet Take by mouth daily       Bystolic 5 MG tablet TAKE 1 TABLET DAILY 90 tablet 3    Coenzyme Q10 (CO Q 10) 10 MG CAPS Take by mouth daily       glucosamine-chondroitin 500-400 MG tablet Take 1 tablet by mouth 3 (three) times a day      meloxicam (MOBIC) 15 mg tablet Take 1 tablet (15 mg total) by mouth daily 30 tablet 0    Misc Natural Products (TART CHERRY ADVANCED PO) Take by mouth      multivitamin (THERAGRAN) TABS Take 1 tablet by mouth daily      Omega-3 Fatty Acids ([employer] FISH OIL) 1200 MG CAPS [employer] Fish Oil 1200 MG Oral Capsule; daily      rosuvastatin (CRESTOR) 20 MG tablet Take 1 tablet (20 mg total) by mouth daily 90 tablet 3    TURMERIC PO Take by mouth       No current facility-administered medications for this visit  Current Outpatient Medications on File Prior to Visit   Medication Sig    aspirin 81 MG tablet Take by mouth daily     Bystolic 5 MG tablet TAKE 1 TABLET DAILY    Coenzyme Q10 (CO Q 10) 10 MG CAPS Take by mouth daily     glucosamine-chondroitin 500-400 MG tablet Take 1 tablet by mouth 3 (three) times a day    meloxicam (MOBIC) 15 mg tablet Take 1 tablet (15 mg total) by mouth daily    Misc Natural Products (TART CHERRY ADVANCED PO) Take by mouth    multivitamin (THERAGRAN) TABS Take 1 tablet by mouth daily    Omega-3 Fatty Acids ([employer] FISH OIL) 1200 MG CAPS [employer] Fish Oil 1200 MG Oral Capsule; daily    rosuvastatin (CRESTOR) 20 MG tablet Take 1 tablet (20 mg total) by mouth daily    TURMERIC PO Take by mouth     No current facility-administered medications on file prior to visit  He is allergic to dilaudid [hydromorphone hcl]       Review of Systems   Constitutional: Negative  HENT: Negative  Eyes: Negative  Respiratory: Negative  Cardiovascular: Negative  Gastrointestinal: Negative  Genitourinary: Negative  Musculoskeletal: Positive for neck stiffness (1 year tightness in shoulders  Pt is not currently in pain  Has some soreness and aching with overuse of right arm)  Negative for neck pain (Right shoulder )  Saw Dr Kc Parents regarding right shouder muscle loss    Had EMG completed     PT: YES, x1, started Monday    MIRNA, NONE, the pt denies significant pain    Allergic/Immunologic: Negative  Neurological: Negative for weakness and numbness  Hematological: Bruises/bleeds easily (ASA 81 mg)  Hx: Cardiac Catherization     Psychiatric/Behavioral: Negative  Objective:       There were no vitals taken for this visit          Physical Exam      I have personally obtain history and examined patient  I have personally reviewed case including all pertinent investigations/studies       Time spent 15 minutes  More than 50% of total time spent on counseling and coordination of care as described above including patient education, discussion of risks and rationale of conservative vs surgical treatment options       HPI     Chronic low grade neck pain with 1 yr exacerbation, associated with right shoulder/upper arm pain worse with activity/throwing ball  Full orthopaedic lo w Dr Catherine Funk without apparent cause  Denies weakness, loss of fine motor, gait, bowel or bladder issues  Recent PT with good benefit  MRI and flex/ext xray completed     Exam     Mild restriction in cervical ROM  mild paravertebral spasm  Weakly positive spurling's right side  Full strength bilateral UE and LE  Intact sensation  Intact DTR  Intact fine motor and coordination  Normal gait   Normal tandem                                   Back:     Symmetric, no curvature, ROM normal, no CVA tenderness         Chest wall:    No tenderness or deformity                           Extremities:   Extremities normal, atraumatic, no cyanosis or edema         Skin:   Skin color, texture, turgor normal, no rashes or lesions      Radiology     Xray     Severe C4-7 cervical disc degeneration resulting in moderate collapse, grossly stable spine of flex/ext      EMG     Chronic right accessory nerve denervation    MRI    Mild to moderate C4-7 disc degeneration without compelling central or foraminal narrowing, mild reversal of normal cervical lordosis     Summary and Plan     Mr Zulma Goode has acute on chronic neck pain and likely radiculopathy in the setting of advanced multilevel spondylosis  Today we reviewed the conservative options including PT, Danyell and medication  I encouraged him to continue with PT as it appears to be helping  I will see him in fu on a PRN basis

## 2021-05-17 ENCOUNTER — OFFICE VISIT (OUTPATIENT)
Dept: PHYSICAL THERAPY | Facility: CLINIC | Age: 61
End: 2021-05-17
Payer: COMMERCIAL

## 2021-05-17 DIAGNOSIS — G52.8 ACCESSORY NERVE DISORDER: ICD-10-CM

## 2021-05-17 DIAGNOSIS — M75.41 SUBACROMIAL IMPINGEMENT OF RIGHT SHOULDER: ICD-10-CM

## 2021-05-17 DIAGNOSIS — M47.812 FACET ARTHROPATHY, CERVICAL: Primary | ICD-10-CM

## 2021-05-17 DIAGNOSIS — G25.89 SCAPULAR DYSKINESIS: ICD-10-CM

## 2021-05-17 DIAGNOSIS — M62.58 MUSCLE ATROPHY OF UPPER EXTREMITY: ICD-10-CM

## 2021-05-17 DIAGNOSIS — M50.30 DEGENERATIVE DISC DISEASE, CERVICAL: ICD-10-CM

## 2021-05-17 PROCEDURE — 97112 NEUROMUSCULAR REEDUCATION: CPT | Performed by: PHYSICAL THERAPIST

## 2021-05-17 PROCEDURE — 97140 MANUAL THERAPY 1/> REGIONS: CPT | Performed by: PHYSICAL THERAPIST

## 2021-05-17 NOTE — PROGRESS NOTES
Daily Note     Today's date: 2021  Patient name: Kayla Live  : 1960  MRN: [id number]  Referring provider: Shira Pruitt DO  Dx:   Encounter Diagnosis     ICD-10-CM    1  Facet arthropathy, cervical  M47 812    2  Muscle atrophy of upper extremity  M62 58    3  Degenerative disc disease, cervical  M50 30    4  Subacromial impingement of right shoulder  M75 41    5  Scapular dyskinesis  G25 89    6  Accessory nerve disorder  G52 8                   Subjective: Patient continues to report only low level soreness on occasion  States he saw neurosurgery last week who reviewed MRI and encouraged him to continue PT until discharged  He will follow up with neurosurgery as needed, as no further treatment (per physician) indicated at this time  Objective: See treatment diary below      Assessment: Tolerated treatment well  Some tightness/restriction within lat which was addressed with pin and stretch flexion stretch and STR to that region  Plan to finish out PT POC and then d/c to HEP  Patient would benefit from continued PT      Plan: Plan to finish out POC next week and then d/c to HEP        Precautions: HTN, high cholesterol         Manuals 3/29 4/5 4/9 4/12 4/19 4/23 4/26 5/3 5/10  5/17   Re-eval      KS       Gr 2-3 Post/Inf GHJ mobs       5'      Functional strength assessment        KS     Subscap release        KS No tenderness Lat release KS    TM/IST release        KS No tenderness KS   Neuro Re-Ed             Pt Edu- posture KS            PSR x20 x10 (between TE) 10x PRN x20        scap retraction  x20  x10 (between TE)  10x PRN x20         Thoracic extension 3"x20  3"x20  3"x20  x20         Scap wall clocks NV Red x 10 ea Red x 10 ea  HEP  HEP      Scap wall slides w foam roller  2x10  2x10  2x10  3x10 3x10      Prone retraction w row  5# 2x10  5# 2x10 2# 2x10 HEP  HEP      Prone retraction w hor abd  2# 2x10  2# 2x10 2# 2x10 HEP  HEP Retrain (palm down/thumb up)  2# x15 ea 2# 2x10 Prone retraction w extension   2# 2x10  2# 2x10 2# 2x10 HEP  HEP retrain  2# 2x10    Prone scap retraction/extension (4 steps) B  x20  20x 1# 1# x20  HEP  HEP retrain 2x10 2# 2x10                 Prone scaption      2x10  2x10  retrain 2# 2x10  2# 2x10    Rhythmic stab    3x20" 2x30"  3x30"  2x30" ea (flex & IR/ER) 2x30" ea  Supine at 90* 2x30" ea Supine at 90* 2x30" ea   TB Chest Pull & ER    Red 2x10 ea Red 2x15 ea         Wall protraction/ retraction      x20   x20       Supine serratus punch     5# x10; 10# x15  10# 2x15 Seated at 120* manual resistance 10# 2x10 10# 2c10    Rebounder toss      Green 3x10  Green 3x10    Red 2x10    Ball roll on wall       x30 cw&ccw  Green x30 cw/ccw      Ther Ex             TB Row/Ext Red 2x10 ea Red 2x10 ea  Red 2x10 ea  Green 2x10 ea Green 2x15 ea  Green 2x15 ea  Green 3x10  Blue for HEP     Shoulder shrugs NV 5# 2x10  5# 2x10  10# 3x10  10# x10; 15# 2x10  15# 3x10  15# 3x10       RetroUBE  6 min  6 min 6 min  6 min  Fwd/bwd 4' ea Fwd/bwd 4' ea Fwd/bwd 4' ea Fwd/bwd 4' ea Fwd/bwd 5' ea   TB IR/ER   RTB 2x10 Red 2x10 ea Green 2x10 ea Green 2x10 ea Green 2x10 ea  At 90* red 2x10 ea    Body blade- at side   3x20" IR/ER 3x20" ea IR/ER & up/down IR/ER & up/down @ 0 deg & 90* hor abd  IR/ER & up/down @ 0 deg & 90* hor abd 2x30" ea IR/ER & up/down @ 0 deg & 90* hor abd 2x30" ea  IR/ER & up/down @ 0 deg & 90* hor abd 2x30" ea    Chest Press        10# 2x15   10# 3x10 15# 2x10                              Ther Activity                                       Gait Training                                       Modalities

## 2021-05-21 ENCOUNTER — APPOINTMENT (OUTPATIENT)
Dept: PHYSICAL THERAPY | Facility: CLINIC | Age: 61
End: 2021-05-21
Payer: COMMERCIAL

## 2021-05-24 ENCOUNTER — OFFICE VISIT (OUTPATIENT)
Dept: PHYSICAL THERAPY | Facility: CLINIC | Age: 61
End: 2021-05-24
Payer: COMMERCIAL

## 2021-05-24 DIAGNOSIS — G52.8 ACCESSORY NERVE DISORDER: ICD-10-CM

## 2021-05-24 DIAGNOSIS — M47.812 FACET ARTHROPATHY, CERVICAL: Primary | ICD-10-CM

## 2021-05-24 DIAGNOSIS — M75.41 SUBACROMIAL IMPINGEMENT OF RIGHT SHOULDER: ICD-10-CM

## 2021-05-24 DIAGNOSIS — M62.58 MUSCLE ATROPHY OF UPPER EXTREMITY: ICD-10-CM

## 2021-05-24 DIAGNOSIS — M50.30 DEGENERATIVE DISC DISEASE, CERVICAL: ICD-10-CM

## 2021-05-24 DIAGNOSIS — G25.89 SCAPULAR DYSKINESIS: ICD-10-CM

## 2021-05-24 PROCEDURE — 97112 NEUROMUSCULAR REEDUCATION: CPT | Performed by: PHYSICAL THERAPIST

## 2021-05-24 PROCEDURE — 97110 THERAPEUTIC EXERCISES: CPT | Performed by: PHYSICAL THERAPIST

## 2021-05-24 NOTE — PROGRESS NOTES
PT Discharge    Today's date: 2021  Patient name: Dontae Rodriguez  : 1960  MRN: [id number]  Referring provider: Piper Lancaster DO  Dx:   Encounter Diagnosis     ICD-10-CM    1  Facet arthropathy, cervical  M47 812    2  Muscle atrophy of upper extremity  M62 58    3  Degenerative disc disease, cervical  M50 30    4  Subacromial impingement of right shoulder  M75 41    5  Scapular dyskinesis  G25 89    6  Accessory nerve disorder  G52 8                   Assessment  Assessment details: Patient is a 61 y/o male with resolved right shoulder pain and muscle imbalance  Since starting therapy, he exhibits decreased pain, improved shoulder ROM, improved shoulder strength, and overall improved functional mobility  He was seen by neurosurgery and at that time, invasive management not warranted; he is following up with neuro only as needed  Following completion of PT, patient will continue with HEP and will call ortho to follow up, as well  Patient provided written HEP and all questions answered  Extensive review of HEP  Patient in agreement with d/c plan  Impairments: abnormal or restricted ROM, activity intolerance, impaired physical strength, lacks appropriate home exercise program and pain with function  Understanding of Dx/Px/POC: good   Prognosis: good    Goals  STG within 4 weeks:   1  Patient to be independent in HEP  MET  2  Reduce pain by 50% to improve quality of life  MET  3  Improve shoulder strength to 4+  MET  LTG within 8 weeks:   1  Patient to be independent in ADLs/IADLs without difficulty  MET  2  Achieve full AROM without pain  MET  3  Patient to be able to perform overhead tasks without pain  MET    Plan  Plan details: D/C to HEP     Patient would benefit from: skilled physical therapy and PT eval  Planned modality interventions: cryotherapy, hydrotherapy and unattended electrical stimulation  Planned therapy interventions: therapeutic training, therapeutic exercise, therapeutic activities, stretching, strengthening, postural training, patient education, neuromuscular re-education, manual therapy, joint mobilization, IADL retraining, activity modification, ADL retraining, ADL training, body mechanics training, flexibility, functional ROM exercises, gait training, graded activity, graded exercise, graded motor and home exercise program  Frequency: 2x week  Treatment plan discussed with: patient        Subjective Evaluation    History of Present Illness  Mechanism of injury: Patient is a 61 y/o RHD male with previous chief complaints of right shoulder pain and muscular imbalance  He states "my shoulder doesn't droop as much as it used to " He reports less shoulder soreness  He states he's able to do all his normal daily household items  He also reports being able to move the tractor out of a ditch  He is pleased with his progress in therapy and denies any difficulty with ADLs  He was seen by neurosurgery and is only being seen as needed as no invasive management is needed, at this time  He will call sports [employer] now that PT is finished to follow up  Cervical xray shows: No fracture  Normal alignment without subluxation  Moderate degenerative disc space narrowing at C4-C5, C5-C6, and C6-C7  Mild left C3-C4 and C4-C5 neural foramen narrowing  Mild right C4-C5 neural foramen narrowing  No evidence of dynamic instability seen with flexion or extension  Shoulder xray: normal             Pain  Current pain ratin  At best pain ratin  At worst pain ratin  Quality: burning  Relieving factors: medications  Progression: resolved    Social Support    Employment status: not working (retired )  Hand dominance: right  Exercise history: None   Life stress: low      Diagnostic Tests  Abnormal x-ray: Severe C4-7 cervical disc degeneration resulting in moderate collapse, grossly stable spine of flex/ext   Abnormal EMG results: Chronic right accessory nerve denervation    Treatments  No previous or current treatments  Patient Goals  Patient goal: "to be able to use my arm properly"         Objective     Concurrent Complaints  Negative for night pain, disturbed sleep, dizziness, faints, headaches, nausea/motion sickness, tinnitus, trouble swallowing, difficulty breathing, shortness of breath, respiratory pain and visual change    Cervical/Thoracic Screen   Cervical range of motion within normal limits with the following exceptions: No pain with AROM or overpressure of cervical spine     Active Range of Motion   Cervical/Thoracic Spine       Cervical    Flexion:  WFL  Extension:  Restriction level: minimal  Left lateral flexion:  WFL  Right lateral flexion:  Restriction level minimal  Left rotation:  WFL  Left Shoulder   Flexion: 160 degrees   Extension: 63 degrees   Abduction: 160 degrees   External rotation 0°: 90 degrees   Internal rotation BTB: T7     Right Shoulder   Flexion: 160 degrees   Extension: 65 degrees   Abduction: 160 degrees   External rotation 0°: 85 degrees   Internal rotation BTB: T7     Strength/Myotome Testing     Left Shoulder     Planes of Motion   Flexion: 5   Abduction: 5   External rotation at 0°: 5   Internal rotation at 0°: 5     Right Shoulder     Planes of Motion   Flexion: 5   Abduction: 5   External rotation at 0°: 4+   Internal rotation at 0°: 5     Additional Strength Details  Prone Hor abd: B: 5/5   Prone Scaption: B: 4+/5   Prone Extension: B: 5/5    General Comments:      Shoulder Comments   Forward shoulder/forward head   Lower right shoulder (much improved from IE)  Adequate scapular mobility with overhead motion   Neuro Exam:     Headaches   Patient reports headaches: No               Precautions: HTN, high cholesterol       Manuals 5/24    4/19 4/23 4/26 5/3 5/10  5/17   Re-eval D/C KS      KS       Gr 2-3 Post/Inf GHJ mobs       5'      Subscap release        KS No tenderness Lat release KS    TM/IST release        KS No tenderness KS   Neuro Re-Ed PSR HEP review    x20        scap retraction  HEP review    x20         Thoracic extension HEP review    x20         Scap wall clocks     HEP  HEP      Scap wall slides w foam roller D/C    2x10  3x10 3x10      Prone retraction w row 10# 2x10     HEP  HEP      Prone retraction w hor abd 5# 2x10     HEP  HEP Retrain (palm down/thumb up)  2# x15 ea 2# 2x10    Prone retraction w extension  5# 2x10    HEP  HEP retrain  2# 2x10    Prone scap retraction/extension (4 steps) B     HEP  HEP retrain 2x10 2# 2x10                 Prone scaption      2x10  2x10  retrain 2# 2x10  2# 2x10    Rhythmic stab      3x30"  2x30" ea (flex & IR/ER) 2x30" ea  Supine at 90* 2x30" ea Supine at 90* 2x30" ea   TB Chest Pull & ER D/C    Red 2x15 ea         Wall protraction/ retraction      x20   x20       Supine serratus punch     5# x10; 10# x15  10# 2x15 Seated at 120* manual resistance 10# 2x10 10# 2c10    Rebounder toss Green x40      Green 3x10  Green 3x10    Red 2x10    Standing flex/abd 3# 2x10 ea            Ther Ex             TB Row/Ext Blue/Black x10 ea     Green 2x15 ea  Green 2x15 ea  Green 3x10  Blue for HEP     Shoulder shrugs 15# 3x10     10# x10; 15# 2x10  15# 3x10  15# 3x10       UBE Fwd/bwd 5' ea    6 min  Fwd/bwd 4' ea Fwd/bwd 4' ea Fwd/bwd 4' ea Fwd/bwd 4' ea Fwd/bwd 5' ea   TB IR/ER At 80* yel ER red IR x10 ea & review at side     Green 2x10 ea Green 2x10 ea Green 2x10 ea  At 90* red 2x10 ea    Body blade- at side D/C    IR/ER & up/down @ 0 deg & 90* hor abd  IR/ER & up/down @ 0 deg & 90* hor abd 2x30" ea IR/ER & up/down @ 0 deg & 90* hor abd 2x30" ea  IR/ER & up/down @ 0 deg & 90* hor abd 2x30" ea    Chest Press  15# 2x10       10# 2x15   10# 3x10 15# 2x10                              Ther Activity                                       Gait Training                                       Modalities

## 2021-05-28 ENCOUNTER — APPOINTMENT (OUTPATIENT)
Dept: PHYSICAL THERAPY | Facility: CLINIC | Age: 61
End: 2021-05-28
Payer: COMMERCIAL

## 2021-07-16 ENCOUNTER — VBI (OUTPATIENT)
Dept: ADMINISTRATIVE | Facility: OTHER | Age: 61
End: 2021-07-16

## 2021-07-16 NOTE — TELEPHONE ENCOUNTER
07/16/21 3:28 PM     See documentation in the [employer] Smart[employer]       [facility] Pneumonia    Pneumonia is an infection of the lungs. Pneumonia may be caused by bacteria, viruses, or funguses. Symptoms include coughing, fever, chest pain when breathing deeply or coughing, shortness of breath, fatigue, or muscle aches. Pneumonia can be diagnosed with a medical history and physical exam, as well as other tests which may include a chest X-ray. If you were prescribed an antibiotic medicine, take it as told by your health care provider and do not stop taking the antibiotic even if you start to feel better. Do not use tobacco products, including cigarettes, chewing tobacco, and e-cigarettes.    SEEK IMMEDIATE MEDICAL CARE IF YOU HAVE ANY OF THE FOLLOWING SYMPTOMS: worsening shortness of breath, worsening chest pain, coughing up blood, change in mental status, lightheadedness/dizziness.    -------------    What is a coronavirus?    Coronaviruses are a large family of viruses that cause illnesses ranging from the common cold to more severe diseases such as Middle East Respiratory Syndrome (MERS) and Severe Acute Respiratory Syndrome (SARS).    What is Novel Coronavirus (COVID-19)?    The Centers for Disease Control and Prevention (CDC) is closely monitoring the outbreak caused by COVID-19. For the latest information about COVID-19, visit the CDC website at CDC.gov/Coronavirus    How are coronaviruses spread?    Coronaviruses can be transmitted from person to person, usually after close contact with an infected    person (for example, in a household, workplace, or healthcare setting), via droplets that become airborne after a cough or sneeze. These droplets can then infect a nearby person. Transmission can also occur by touching recently contaminated surfaces.    Is there a treatment for a COVID-19?    There is no specific treatment for disease caused by COVID-19. However, many of the symptoms can be treated based on the patient’s clinical condition. Supportive care for infected persons can be highly effective.  There is presently no good evidence that chloroquine or hydroxychloroquine prevents hospitalizations due to COVID-19 infections.  Prophylaxis is not warranted at this time.    What are the symptoms of coronavirus infection?    It depends on the virus, but common signs include fever and/or respiratory symptoms such as cough and shortness of breath. In more severe cases, infection can cause pneumonia, severe acute respiratory syndrome, kidney failure and even death. Fortunately, most cases of COVID-19 have an illness no different than the influenza (flu), with a majority of these patients having mild symptoms and overall mortality which appears to be not much different than the flu.    What can I do to protect myself?    The best precautionary measures:    – WASHING YOUR HANDS    – covering your cough (IN THE CROOK OF YOUR ARM - NOT YOUR HAND)    – disinfecting surfaces    – it is also advisable to avoid close contact with anyone showing symptoms of respiratory illness such as coughing and sneezing    – those with symptoms should wear a surgical mask when around others      What can I do to protect those around me?    If you have been identified as someone who may be infected with COVID-19, we recommend you follow the self-isolation procedures outlined on the following page to protect those around you and to limit the spread of this virus.    We recommend the below precautionary steps from now until 14 days from when you returned from your travel or date of your last known possible contact:      — Do not go to work, school or public areas. Avoid using public transportation, ride-sharing or taxis.      — As much as possible, separate yourself from other people in your home. If you can, you should stay in a room and away from other people. Also, you should use a separate bathroom if available.      — Wear the supplied mask whenever you are around other people.      — If you have a non-urgent medical appointment, please reschedule for a later date. If the appointment is urgent, please call the health care provider and tell them that you are on self-isolation for possible COVID-19. This will help the health care provider’s office take steps to keep other people from getting infected or exposed. If you can reschedule routine appointments, do so.      — Wash your hands often with soap and water for at least 15 to 20 seconds or clean your hands with an alcohol-based hand  that contains 60 to 95% alcohol, covering all surfaces of your hands and rubbing them together until they feel dry. Soap and water should be used preferentially if hands are visibly dirty.      — Cover your mouth and nose with a tissue when you cough or sneeze. Throw used tissues in a lined trash can. Immediately wash your hands.      — Avoid touching your eyes, nose, and mouth with your hands.      — Avoid sharing personal household items. You should not share dishes, drinking glasses, cups, eating utensils, towels, or bedding with other people or pets in your home. After using these items, they should be washed thoroughly with soap and water.      — Clean and disinfect all “high-touch” surfaces every day. High touch surfaces include counters, tabletops, doorknobs, light switches, remote controls, bathroom fixtures, toilets, phones, keyboards, tablets, and bedside tables. Also, clean any surfaces that may have blood, stool, or body fluids on them.

## 2021-08-25 ENCOUNTER — APPOINTMENT (OUTPATIENT)
Dept: LAB | Facility: CLINIC | Age: 61
End: 2021-08-25
Payer: COMMERCIAL

## 2021-08-25 DIAGNOSIS — E78.5 HYPERLIPIDEMIA LDL GOAL <100: Chronic | ICD-10-CM

## 2021-08-25 LAB
ALBUMIN SERPL BCP-MCNC: 3.9 G/DL (ref 3.5–5)
ALP SERPL-CCNC: 53 U/L (ref 46–116)
ALT SERPL W P-5'-P-CCNC: 44 U/L (ref 12–78)
ANION GAP SERPL CALCULATED.3IONS-SCNC: 4 MMOL/L (ref 4–13)
AST SERPL W P-5'-P-CCNC: 26 U/L (ref 5–45)
BASOPHILS # BLD AUTO: 0.04 THOUSANDS/ΜL (ref 0–0.1)
BASOPHILS NFR BLD AUTO: 1 % (ref 0–1)
BILIRUB SERPL-MCNC: 1.75 MG/DL (ref 0.2–1)
BUN SERPL-MCNC: 12 MG/DL (ref 5–25)
CALCIUM SERPL-MCNC: 8.7 MG/DL (ref 8.3–10.1)
CHLORIDE SERPL-SCNC: 108 MMOL/L (ref 100–108)
CHOLEST SERPL-MCNC: 167 MG/DL (ref 50–200)
CO2 SERPL-SCNC: 26 MMOL/L (ref 21–32)
CREAT SERPL-MCNC: 0.97 MG/DL (ref 0.6–1.3)
EOSINOPHIL # BLD AUTO: 0.17 THOUSAND/ΜL (ref 0–0.61)
EOSINOPHIL NFR BLD AUTO: 3 % (ref 0–6)
ERYTHROCYTE [DISTWIDTH] IN BLOOD BY AUTOMATED COUNT: 13.4 % (ref 11.6–15.1)
GFR SERPL CREATININE-BSD FRML MDRD: 85 ML/MIN/1.73SQ M
GLUCOSE P FAST SERPL-MCNC: 81 MG/DL (ref 65–99)
HCT VFR BLD AUTO: 50.5 % (ref 36.5–49.3)
HDLC SERPL-MCNC: 74 MG/DL
HGB BLD-MCNC: 16.8 G/DL (ref 12–17)
IMM GRANULOCYTES # BLD AUTO: 0.02 THOUSAND/UL (ref 0–0.2)
IMM GRANULOCYTES NFR BLD AUTO: 0 % (ref 0–2)
LDLC SERPL CALC-MCNC: 79 MG/DL (ref 0–100)
LYMPHOCYTES # BLD AUTO: 1.62 THOUSANDS/ΜL (ref 0.6–4.47)
LYMPHOCYTES NFR BLD AUTO: 28 % (ref 14–44)
MCH RBC QN AUTO: 30.6 PG (ref 26.8–34.3)
MCHC RBC AUTO-ENTMCNC: 33.3 G/DL (ref 31.4–37.4)
MCV RBC AUTO: 92 FL (ref 82–98)
MONOCYTES # BLD AUTO: 0.57 THOUSAND/ΜL (ref 0.17–1.22)
MONOCYTES NFR BLD AUTO: 10 % (ref 4–12)
NEUTROPHILS # BLD AUTO: 3.48 THOUSANDS/ΜL (ref 1.85–7.62)
NEUTS SEG NFR BLD AUTO: 58 % (ref 43–75)
NONHDLC SERPL-MCNC: 93 MG/DL
NRBC BLD AUTO-RTO: 0 /100 WBCS
PLATELET # BLD AUTO: 274 THOUSANDS/UL (ref 149–390)
PMV BLD AUTO: 11.2 FL (ref 8.9–12.7)
POTASSIUM SERPL-SCNC: 3.9 MMOL/L (ref 3.5–5.3)
PROT SERPL-MCNC: 7.5 G/DL (ref 6.4–8.2)
RBC # BLD AUTO: 5.49 MILLION/UL (ref 3.88–5.62)
SODIUM SERPL-SCNC: 138 MMOL/L (ref 136–145)
TRIGL SERPL-MCNC: 70 MG/DL
TSH SERPL DL<=0.05 MIU/L-ACNC: 2.07 UIU/ML (ref 0.36–3.74)
WBC # BLD AUTO: 5.9 THOUSAND/UL (ref 4.31–10.16)

## 2021-08-25 PROCEDURE — 84443 ASSAY THYROID STIM HORMONE: CPT

## 2021-08-25 PROCEDURE — 85025 COMPLETE CBC W/AUTO DIFF WBC: CPT

## 2021-08-25 PROCEDURE — 80061 LIPID PANEL: CPT

## 2021-08-25 PROCEDURE — 80053 COMPREHEN METABOLIC PANEL: CPT

## 2021-08-25 PROCEDURE — 36415 COLL VENOUS BLD VENIPUNCTURE: CPT

## 2021-08-26 ENCOUNTER — RA CDI HCC (OUTPATIENT)
Dept: OTHER | Facility: HOSPITAL | Age: 61
End: 2021-08-26

## 2021-08-26 NOTE — PROGRESS NOTES
[facility]  coding opportunities       Chart reviewed, no opportunity found: CHART REVIEWED, NO OPPORTUNITY FOUND                        Patients insurance company:  [employer] [facility] (Medicare Advantage and Commercial)

## 2021-09-02 ENCOUNTER — OFFICE VISIT (OUTPATIENT)
Dept: INTERNAL MEDICINE CLINIC | Facility: CLINIC | Age: 61
End: 2021-09-02
Payer: COMMERCIAL

## 2021-09-02 ENCOUNTER — APPOINTMENT (OUTPATIENT)
Dept: LAB | Facility: CLINIC | Age: 61
End: 2021-09-02
Payer: COMMERCIAL

## 2021-09-02 VITALS
HEIGHT: 64 IN | DIASTOLIC BLOOD PRESSURE: 82 MMHG | OXYGEN SATURATION: 98 % | WEIGHT: 183.8 LBS | TEMPERATURE: 98.2 F | BODY MASS INDEX: 31.38 KG/M2 | HEART RATE: 62 BPM | SYSTOLIC BLOOD PRESSURE: 118 MMHG

## 2021-09-02 DIAGNOSIS — I10 ESSENTIAL HYPERTENSION: Primary | Chronic | ICD-10-CM

## 2021-09-02 DIAGNOSIS — N41.9 PROSTATITIS, UNSPECIFIED PROSTATITIS TYPE: ICD-10-CM

## 2021-09-02 DIAGNOSIS — E78.5 HYPERLIPIDEMIA LDL GOAL <100: Chronic | ICD-10-CM

## 2021-09-02 DIAGNOSIS — I25.10 CORONARY ARTERIOSCLEROSIS IN NATIVE ARTERY: ICD-10-CM

## 2021-09-02 DIAGNOSIS — Z12.11 SCREENING FOR COLON CANCER: ICD-10-CM

## 2021-09-02 PROBLEM — G89.29 CHRONIC RIGHT SHOULDER PAIN: Status: RESOLVED | Noted: 2021-02-25 | Resolved: 2021-09-02

## 2021-09-02 PROBLEM — M25.511 CHRONIC RIGHT SHOULDER PAIN: Status: RESOLVED | Noted: 2021-02-25 | Resolved: 2021-09-02

## 2021-09-02 LAB
BACTERIA UR QL AUTO: NORMAL /HPF
BILIRUB UR QL STRIP: NEGATIVE
CLARITY UR: CLEAR
COLOR UR: YELLOW
GLUCOSE UR STRIP-MCNC: NEGATIVE MG/DL
HGB UR QL STRIP.AUTO: NEGATIVE
HYALINE CASTS #/AREA URNS LPF: NORMAL /LPF
KETONES UR STRIP-MCNC: NEGATIVE MG/DL
LEUKOCYTE ESTERASE UR QL STRIP: NEGATIVE
NITRITE UR QL STRIP: NEGATIVE
NON-SQ EPI CELLS URNS QL MICRO: NORMAL /HPF
PH UR STRIP.AUTO: 6 [PH]
PROT UR STRIP-MCNC: NEGATIVE MG/DL
RBC #/AREA URNS AUTO: NORMAL /HPF
SP GR UR STRIP.AUTO: 1.01 (ref 1–1.03)
UROBILINOGEN UR QL STRIP.AUTO: 0.2 E.U./DL
WBC #/AREA URNS AUTO: NORMAL /HPF

## 2021-09-02 PROCEDURE — 84154 ASSAY OF PSA FREE: CPT

## 2021-09-02 PROCEDURE — 84153 ASSAY OF PSA TOTAL: CPT

## 2021-09-02 PROCEDURE — 3074F SYST BP LT 130 MM HG: CPT | Performed by: INTERNAL MEDICINE

## 2021-09-02 PROCEDURE — 36415 COLL VENOUS BLD VENIPUNCTURE: CPT

## 2021-09-02 PROCEDURE — 87086 URINE CULTURE/COLONY COUNT: CPT

## 2021-09-02 PROCEDURE — 3725F SCREEN DEPRESSION PERFORMED: CPT | Performed by: INTERNAL MEDICINE

## 2021-09-02 PROCEDURE — 3008F BODY MASS INDEX DOCD: CPT | Performed by: INTERNAL MEDICINE

## 2021-09-02 PROCEDURE — 99214 OFFICE O/P EST MOD 30 MIN: CPT | Performed by: INTERNAL MEDICINE

## 2021-09-02 PROCEDURE — 3079F DIAST BP 80-89 MM HG: CPT | Performed by: INTERNAL MEDICINE

## 2021-09-02 PROCEDURE — 1036F TOBACCO NON-USER: CPT | Performed by: INTERNAL MEDICINE

## 2021-09-02 PROCEDURE — 81001 URINALYSIS AUTO W/SCOPE: CPT | Performed by: INTERNAL MEDICINE

## 2021-09-02 NOTE — PROGRESS NOTES
INTERNAL MEDICINE OFFICE VISIT  [facility]  [facility], [address]  Tel: [phone]      NAME: Sumanth Bustillo  AGE: 62 y o  SEX: male  : 1960   MRN: [id number]    DATE: 2021  TIME: 9:16 AM      Assessment and Plan:  1  Essential hypertension   continue present medication    2  Hyperlipidemia LDL goal <100   continue Crestor  - CBC and differential; Future  - Comprehensive metabolic panel; Future  - Lipid panel; Future  - TSH, 3rd generation; Future    3  Coronary arteriosclerosis in native artery   follow-up with cardiology    4  Prostatitis, unspecified prostatitis type   was advised to get a urine culture and a PSA done and see the urologist    - Ambulatory referral to Urology; Future  - Urinalysis with microscopic  - Urine culture; Future  - PSA, total and free; Future    5  Screening for colon cancer    - Ambulatory referral to Gastroenterology; Future      - Counseling Documentation: patient was counseled regarding: diagnostic results, instructions for management, risk factor reductions, prognosis, patient and family education, risks and benefits of treatment options and importance of compliance with treatment  - Medication Side Effects: Adverse side effects of medications were reviewed with the patient/guardian today  Return for follow up visit in 6 months or earlier, if needed  Chief Complaint:  Chief Complaint   Patient presents with    Follow-up         History of Present Illness:    blood pressure is well controlled  Cholesterol is under control with the Crestor  He does not complain of any symptoms of chest pain or shortness of breath  He does have an uncomfortable feeling in the region of his prostate for the last 1 week    He has a history of prostatitis in the past but has not seen a urologist for a long time  He is due for a colonoscopy      Active Problem List:  Patient Active Problem List   Diagnosis  Coronary arteriosclerosis in native artery    Gastroesophageal reflux disease without esophagitis    Hyperlipidemia LDL goal <100    Essential hypertension    Obesity (BMI 30 0-34  9)         Past Medical History:  Past Medical History:   Diagnosis Date    CAD (coronary artery disease) 2009    50% RCA stenosis on coronary angiogram     GERD (gastroesophageal reflux disease)     Hyperlipidemia     Hypertension     Obesity     Schatzki's ring 02/07/2014         Past Surgical History:  Past Surgical History:   Procedure Laterality Date    CARDIAC CATHETERIZATION  2009, 2018    COLONOSCOPY  10/13/2011    ESOPHAGOGASTRODUODENOSCOPY           Family History:  Family History   Problem Relation Age of Onset    Coronary artery disease Father     Heart attack Father 40    Arrhythmia Father     Cancer Maternal Aunt         GI         Social History:  Social History     Socioeconomic History    Marital status: /Civil Union     Spouse name: None    Number of children: None    Years of education: None    Highest education level: None   Occupational History    None   Tobacco Use    Smoking status: Never Smoker    Smokeless tobacco: Never Used   Vaping Use    Vaping Use: Never used   Substance and Sexual Activity    Alcohol use: Yes     Comment: 5 a week    Drug use: No    Sexual activity: Yes     Partners: Female   Other Topics Concern    None   Social History Narrative    None     Social Determinants of Health     Financial Resource Strain:     Difficulty of Paying Living Expenses:    Food Insecurity:     Worried About Running Out of Food in the Last Year:     Ran Out of Food in the Last Year:    Transportation Needs:     Lack of Transportation (Medical):      Lack of Transportation (Non-Medical):    Physical Activity: Inactive    Days of Exercise per Week: 0 days    Minutes of Exercise per Session: 0 min   Stress: No Stress Concern Present    Feeling of Stress : Not at all   Social Connections:     Frequency of Communication with Friends and Family:     Frequency of Social Gatherings with Friends and Family:     Attends Alevism Services:     Active Member of Clubs or Organizations:     Attends Club or Organization Meetings:     Marital Status:    Intimate Partner Violence:     Fear of Current or Ex-Partner:     Emotionally Abused:     Physically Abused:     Sexually Abused: Allergies:   Allergies   Allergen Reactions    Dilaudid [Hydromorphone Hcl] Other (See Comments)     Pt states he had a bad experience, blood pressure dropped          Medications:    Current Outpatient Medications:     aspirin 81 MG tablet, Take by mouth daily , Disp: , Rfl:     Bystolic 5 MG tablet, TAKE 1 TABLET DAILY, Disp: 90 tablet, Rfl: 3    Coenzyme Q10 (CO Q 10) 10 MG CAPS, Take by mouth daily , Disp: , Rfl:     glucosamine-chondroitin 500-400 MG tablet, Take 1 tablet by mouth 3 (three) times a day, Disp: , Rfl:     Misc Natural Products (TART CHERRY ADVANCED PO), Take by mouth, Disp: , Rfl:     multivitamin (THERAGRAN) TABS, Take 1 tablet by mouth daily, Disp: , Rfl:     Omega-3 Fatty Acids ([employer] FISH OIL) 1200 MG CAPS, CVS Fish Oil 1200 MG Oral Capsule; daily, Disp: , Rfl:     rosuvastatin (CRESTOR) 20 MG tablet, Take 1 tablet (20 mg total) by mouth daily, Disp: 90 tablet, Rfl: 3    TURMERIC PO, Take by mouth, Disp: , Rfl:       The following portions of the patient's history were reviewed and updated as appropriate: past medical history, past surgical history, family history, social history, allergies, current medications and active problem list       Review of Systems:  Constitutional: Denies fever, chills, weight gain, weight loss, fatigue  Eyes: Denies eye redness, eye discharge, double vision, change in visual acuity  ENT: Denies hearing loss, tinnitus, sneezing, nasal congestion, nasal discharge, sore throat   Respiratory: Denies cough, expectoration, hemoptysis, shortness of breath, wheezing  Cardiovascular: Denies chest pain, palpitations, lower extremity swelling, orthopnea, PND  Gastrointestinal: Denies abdominal pain, heartburn, nausea, vomiting, hematemesis, diarrhea, bloody stools  Genito-Urinary: Denies dysuria, frequency, difficulty in micturition, nocturia, incontinence  Has symptoms of prostatitis  Musculoskeletal: Denies back pain, joint pain, muscle pain  Neurologic: Denies confusion, lightheadedness, syncope, headache, focal weakness, sensory changes, seizures  Endocrine: Denies polyuria, polydipsia, temperature intolerance  Allergy and Immunology: Denies hives, insect bite sensitivity  Hematological and Lymphatic: Denies bleeding problems, swollen glands   Psychological: Denies depression, suicidal ideation, anxiety, panic, mood swings  Dermatological: Denies pruritus, rash, skin lesion changes      Vitals:  Vitals:    09/02/21 0856   BP: 118/82   Pulse: 62   Temp: 98 2 °F (36 8 °C)   SpO2: 98%       Body mass index is 31 55 kg/m²  Weight (last 2 days)     Date/Time   Weight    09/02/21 0856   83 4 (183 8)                Physical Examination:  General: Patient is not in acute distress  Awake, alert, responding to commands  No weight gain or loss  Head: Normocephalic  Atraumatic  Eyes: Conjunctiva and lids with no swelling, erythema or discharge  Both pupils normal sized, round and reactive to light  Sclera nonicteric  ENT: External examination of nose and ear normal  Otoscopic examination shows translucent tympanic membranes with patent canals without erythema  Oropharynx moist with no erythema, edema, exudate or lesions  Neck: Supple  JVP not raised  Trachea midline  No masses  No thyromegaly  Lungs: No signs of increased work of breathing or respiratory distress  Bilateral bronchovascular breath sounds with no crackles or rhonchi  Chest wall: No tenderness  Cardiovascular: Normal PMI  No thrills  Regular rate and rhythm   S1 and S2 normal  No murmur, rub or gallop  Gastrointestinal: Abdomen soft, nontender  No guarding or rigidity  Liver and spleen not palpable  Bowel sounds present  Neurologic: Cranial nerves II-XII intact   Cortical functions normal  Motor system - Reflexes 2+ and symmetrical  Sensations normal  Musculoskeletal: Gait normal  No joint tenderness  Integumentary: Skin normal with no rash or lesions  Lymphatic: No palpable lymph nodes in neck, axilla or groin  Extremities: No clubbing, cyanosis, edema or varicosities  Psychological: Judgement and insight normal  Mood and affect normal      Laboratory Results:  CBC with diff:   Lab Results   Component Value Date    WBC 5 90 08/25/2021    WBC 8 4 08/07/2014    RBC 5 49 08/25/2021    RBC 4 97 08/07/2014    HGB 16 8 08/25/2021    HGB 14 5 08/07/2014    HCT 50 5 (H) 08/25/2021    HCT 43 8 08/07/2014    MCV 92 08/25/2021    MCV 88 08/07/2014    MCH 30 6 08/25/2021    MCH 29 3 08/07/2014    RDW 13 4 08/25/2021    RDW 13 2 08/07/2014     08/25/2021     08/07/2014       CMP:  Lab Results   Component Value Date    CREATININE 0 97 08/25/2021    CREATININE 0 8 08/07/2014    BUN 12 08/25/2021    BUN 12 08/07/2014     08/07/2014    K 3 9 08/25/2021    K 3 8 08/07/2014     08/25/2021     08/07/2014    CO2 26 08/25/2021    CO2 24 2 08/07/2014    GLUCOSE 78 08/07/2014    PROT 7 0 08/07/2014    ALKPHOS 53 08/25/2021    ALKPHOS 83 08/07/2014    ALT 44 08/25/2021    ALT 28 08/07/2014    AST 26 08/25/2021    AST 21 08/07/2014       No results found for: HGBA1C, MG, PHOS    No results found for: TROPONINI, CKMB, CKTOTAL    Lipid Profile:   Lab Results   Component Value Date    CHOL 138 08/07/2014     Lab Results   Component Value Date    HDL 74 08/25/2021    HDL 56 02/20/2021     Lab Results   Component Value Date    LDLCALC 79 08/25/2021    LDLCALC 56 02/20/2021     Lab Results   Component Value Date    TRIG 70 08/25/2021    TRIG 110 02/20/2021       Imaging Results:  MRI cervical spine without contrast  Narrative: MRI CERVICAL SPINE WITHOUT CONTRAST    INDICATION: Cervicalgia, radiculopathy    COMPARISON:  X-ray cervical spine 3/5/2021    TECHNIQUE:  Sagittal T1, sagittal T2, sagittal inversion recovery, axial T2, axial  2D merge    IMAGE QUALITY:  Diagnostic    FINDINGS:    ALIGNMENT:  There is straightening of normal cervical lordosis  Grade 1 anterolisthesis of C7 on T1  No significant spondylolisthesis  Vertebrae heights are maintained  MARROW SIGNAL:  Normal marrow signal is identified within the visualized bony structures  No discrete marrow lesion  CERVICAL AND VISUALIZED THORACIC CORD:  Normal signal within the visualized cord  PREVERTEBRAL AND PARASPINAL SOFT TISSUES:  Normal     VISUALIZED POSTERIOR FOSSA:  The visualized posterior fossa demonstrates no abnormal signal     CERVICAL DISC SPACES:    C2-C3:  There is tiny central/right paracentral disc protrusion  There is mild endplate spurring  Mild left facet arthropathy  No canal stenosis  Minimal left foraminal narrowing  C3-C4:  There is minimal disc bulge  Bilateral uncovertebral spurring and mild facet arthropathy  There is mild canal stenosis  Mild left foraminal narrowing  C4-C5:  There is left eccentric disc osteophyte complex along with uncovertebral spurring resulting in left greater than right moderate bilateral foraminal narrowing and mild canal stenosis  C5-C6:  Posterior disc osteophyte complex and bilateral uncovertebral spurring  There is mild canal and mild bilateral foraminal stenosis  C6-C7:  There is disc bulge  Mild bilateral uncovertebral spurring  There is mild canal stenosis  Mild left foraminal narrowing  C7-T1:  Left eccentric disc bulge  Minimal uncovertebral spurring  There is mild canal narrowing  Mild left foraminal narrowing      UPPER THORACIC DISC SPACES:  Normal   Impression: Multilevel mild canal and mild to moderate degree foraminal stenosis as detailed above     Workstation performed: [id number]       Health Maintenance:  Health Maintenance   Topic Date Due    DTaP,Tdap,and Td Vaccines (1 - Tdap) Never done    Annual Physical  06/13/2020    Influenza Vaccine (1) 09/01/2021    Colorectal Cancer Screening  10/13/2021    BMI: Followup Plan  02/25/2022    BMI: Adult  04/01/2022    Depression Screening PHQ  09/02/2022    Hepatitis C Screening  Completed    COVID-19 Vaccine  Completed    Pneumococcal Vaccine: Pediatrics (0 to 5 Years) and At-Risk Patients (6 to 59 Years)  Aged Out    HIB Vaccine  Aged Out    Hepatitis B Vaccine  Aged Out    IPV Vaccine  Aged Out    Hepatitis A Vaccine  Aged Out    Meningococcal ACWY Vaccine  Aged Out    HPV Vaccine  Aged Out    HIV Screening  Discontinued     Immunization History   Administered Date(s) Administered    INFLUENZA 12/16/2008, 09/17/2018    Influenza, injectable, quadrivalent, preservative free 0 5 mL 09/17/2018, 09/23/2019    Influenza, seasonal, injectable 10/01/2017    Pneumococcal Polysaccharide PPV23 12/16/2008    SARS-CoV-2 / COVID-19 mRNA IM (Carlos A Del Castillo) 03/17/2021, 04/15/2021         Melvin Jackson MD  9/2/2021,9:16 AM

## 2021-09-03 LAB
PSA FREE MFR SERPL: 28.6 %
PSA FREE SERPL-MCNC: 0.2 NG/ML
PSA SERPL-MCNC: 0.7 NG/ML (ref 0–4)

## 2021-09-04 LAB — BACTERIA UR CULT: NORMAL

## 2021-09-07 ENCOUNTER — TELEPHONE (OUTPATIENT)
Dept: INTERNAL MEDICINE CLINIC | Facility: CLINIC | Age: 61
End: 2021-09-07

## 2021-09-07 NOTE — TELEPHONE ENCOUNTER
----- Message from Adri Funk MD sent at 9/7/2021 10:04 AM EDT -----   Vision culture does not show UTI   PSA is normal

## 2021-09-09 ENCOUNTER — OFFICE VISIT (OUTPATIENT)
Dept: UROLOGY | Facility: CLINIC | Age: 61
End: 2021-09-09
Payer: COMMERCIAL

## 2021-09-09 VITALS
DIASTOLIC BLOOD PRESSURE: 70 MMHG | SYSTOLIC BLOOD PRESSURE: 112 MMHG | HEIGHT: 64 IN | BODY MASS INDEX: 29.71 KG/M2 | HEART RATE: 79 BPM | WEIGHT: 174 LBS

## 2021-09-09 DIAGNOSIS — N40.1 BENIGN PROSTATIC HYPERPLASIA WITH WEAK URINARY STREAM: ICD-10-CM

## 2021-09-09 DIAGNOSIS — R39.12 BENIGN PROSTATIC HYPERPLASIA WITH WEAK URINARY STREAM: ICD-10-CM

## 2021-09-09 DIAGNOSIS — N41.9 PROSTATITIS, UNSPECIFIED PROSTATITIS TYPE: Primary | ICD-10-CM

## 2021-09-09 LAB
SL AMB  POCT GLUCOSE, UA: NORMAL
SL AMB LEUKOCYTE ESTERASE,UA: NORMAL
SL AMB POCT BILIRUBIN,UA: NORMAL
SL AMB POCT BLOOD,UA: NORMAL
SL AMB POCT CLARITY,UA: CLEAR
SL AMB POCT COLOR,UA: YELLOW
SL AMB POCT KETONES,UA: NORMAL
SL AMB POCT NITRITE,UA: NORMAL
SL AMB POCT PH,UA: 7.5
SL AMB POCT SPECIFIC GRAVITY,UA: 1
SL AMB POCT URINE PROTEIN: NORMAL
SL AMB POCT UROBILINOGEN: 0.2

## 2021-09-09 PROCEDURE — 3078F DIAST BP <80 MM HG: CPT | Performed by: PHYSICIAN ASSISTANT

## 2021-09-09 PROCEDURE — 3008F BODY MASS INDEX DOCD: CPT | Performed by: PHYSICIAN ASSISTANT

## 2021-09-09 PROCEDURE — 1036F TOBACCO NON-USER: CPT | Performed by: PHYSICIAN ASSISTANT

## 2021-09-09 PROCEDURE — 3074F SYST BP LT 130 MM HG: CPT | Performed by: PHYSICIAN ASSISTANT

## 2021-09-09 PROCEDURE — 99203 OFFICE O/P NEW LOW 30 MIN: CPT | Performed by: PHYSICIAN ASSISTANT

## 2021-09-09 PROCEDURE — 81002 URINALYSIS NONAUTO W/O SCOPE: CPT | Performed by: PHYSICIAN ASSISTANT

## 2021-09-09 RX ORDER — TAMSULOSIN HYDROCHLORIDE 0.4 MG/1
0.4 CAPSULE ORAL
Qty: 30 CAPSULE | Refills: 3 | Status: SHIPPED | OUTPATIENT
Start: 2021-09-09 | End: 2021-10-21 | Stop reason: SDUPTHER

## 2021-09-09 RX ORDER — LORATADINE 10 MG/1
10 TABLET ORAL DAILY
COMMUNITY

## 2021-09-09 NOTE — PROGRESS NOTES
9/9/2021      Chief Complaint   Patient presents with    Prostatitis         Assessment and Plan    62 y o  male -- New patient    1  Prostate exam  - Most recent PSA 0 7  - Urine culture negative for infection  - UA today negative for nitrites, leukocytes, blood  - AL today showing an enlarged prostate, no bogginess or tenderness, no palpable nodules  - Discussed addition of Flomax to help with urinary stream; medication and side effects reviewed and prescription sent to pharmacy  - Will follow up in 6 weeks for reassessment or sooner if patient develops fever, severe pain  - Will call in the meantime with any questions or concerns  - All questions answered; patient understands and agrees with plan      History of Present Illness  Kwame Pulido is a 62 y o  male new patient here for evaluation of prostate discomfort  Patient has been having an uncomfortable feeling in the region of his prostate for the last 2 weeks  States he does have a history of prostatitis many years ago and has not seen urology recently  States he can "feel that his prostate is there" and it is not necessarily pain  PSA last week was 0 7  Urine culture was negative for infection  Denies urinary symptoms, fevers, chills, nausea, vomiting, gross blood in urine, dysuria, flank pain, suprapubic pain  Notes he does have weakened stream      Denies family history of  malignancies  Review of Systems   Constitutional: Negative for activity change, appetite change, chills and fever  HENT: Negative for congestion and trouble swallowing  Respiratory: Negative for cough and shortness of breath  Cardiovascular: Negative for chest pain, palpitations and leg swelling  Gastrointestinal: Negative for abdominal pain, constipation, diarrhea, nausea and vomiting  Genitourinary: Negative for difficulty urinating, dysuria, flank pain, frequency, hematuria and urgency          Prostate fullness; weak stream   Musculoskeletal: Negative for back pain and gait problem  Skin: Negative for wound  Allergic/Immunologic: Negative for immunocompromised state  Neurological: Negative for dizziness and syncope  Hematological: Does not bruise/bleed easily  Psychiatric/Behavioral: Negative for confusion  All other systems reviewed and are negative  Vitals  Vitals:    09/09/21 0939   BP: 112/70   Pulse: 79   Weight: 78 9 kg (174 lb)   Height: 5' 4" (1 626 m)       Physical Exam  Constitutional:       Appearance: Normal appearance  HENT:      Head: Normocephalic  Pulmonary:      Effort: Pulmonary effort is normal    Genitourinary:     Comments: Good rectal tone  Prostate approximately 45-50 g, smooth, not tender or boggy, symmetric, no palpable nodules  Musculoskeletal:      Cervical back: Normal range of motion  Skin:     General: Skin is warm and dry  Neurological:      General: No focal deficit present  Mental Status: He is alert and oriented to person, place, and time  Psychiatric:         Mood and Affect: Mood normal          Behavior: Behavior normal          Thought Content:  Thought content normal          Judgment: Judgment normal            Past History  Past Medical History:   Diagnosis Date    CAD (coronary artery disease) 2009    50% RCA stenosis on coronary angiogram     GERD (gastroesophageal reflux disease)     Hyperlipidemia     Hypertension     Obesity     Schatzki's ring 02/07/2014     Social History     Socioeconomic History    Marital status: /Civil Union     Spouse name: None    Number of children: None    Years of education: None    Highest education level: None   Occupational History    None   Tobacco Use    Smoking status: Never Smoker    Smokeless tobacco: Never Used   Vaping Use    Vaping Use: Never used   Substance and Sexual Activity    Alcohol use: Yes     Comment: 5 a week    Drug use: No    Sexual activity: Yes     Partners: Female   Other Topics Concern    None   Social History Narrative    None     Social Determinants of Health     Financial Resource Strain:     Difficulty of Paying Living Expenses:    Food Insecurity:     Worried About Running Out of Food in the Last Year:     [address] in the Last Year:    Transportation Needs:     Lack of Transportation (Medical):      Lack of Transportation (Non-Medical):    Physical Activity: Inactive    Days of Exercise per Week: 0 days    Minutes of Exercise per Session: 0 min   Stress: No Stress Concern Present    Feeling of Stress : Not at all   Social Connections:     Frequency of Communication with Friends and Family:     Frequency of Social Gatherings with Friends and Family:     Attends Shinto Services:     Active Member of Clubs or Organizations:     Attends Club or Organization Meetings:     Marital Status:    Intimate Partner Violence:     Fear of Current or Ex-Partner:     Emotionally Abused:     Physically Abused:     Sexually Abused:      Social History     Tobacco Use   Smoking Status Never Smoker   Smokeless Tobacco Never Used     Family History   Problem Relation Age of Onset    Coronary artery disease Father     Heart attack Father 40    Arrhythmia Father     Cancer Maternal Aunt         GI       The following portions of the patient's history were reviewed and updated as appropriate: allergies, current medications, past medical history, past social history, past surgical history and problem list     Results  Recent Results (from the past 1 hour(s))   POCT urine dip    Collection Time: 09/09/21  9:45 AM   Result Value Ref Range    LEUKOCYTE ESTERASE,UA -     NITRITE,UA -     SL AMB POCT UROBILINOGEN 0 2     POCT URINE PROTEIN -      PH,UA 7 5     BLOOD,UA -     SPECIFIC GRAVITY,UA 1 005     KETONES,UA -     BILIRUBIN,UA -     GLUCOSE, UA -      COLOR,UA yellow     CLARITY,UA clear    ]  Lab Results   Component Value Date    PSA 0 7 09/02/2021    PSA 0 5 06/13/2019    PSA 0 4 03/14/2018    PSA 0 5 02/25/2017     Lab Results   Component Value Date    GLUCOSE 78 08/07/2014    CALCIUM 8 7 08/25/2021     08/07/2014    K 3 9 08/25/2021    CO2 26 08/25/2021     08/25/2021    BUN 12 08/25/2021    CREATININE 0 97 08/25/2021     Lab Results   Component Value Date    WBC 5 90 08/25/2021    HGB 16 8 08/25/2021    HCT 50 5 (H) 08/25/2021    MCV 92 08/25/2021     08/25/2021       Davy Young PA-C

## 2021-10-13 ENCOUNTER — TELEPHONE (OUTPATIENT)
Dept: GASTROENTEROLOGY | Facility: CLINIC | Age: 61
End: 2021-10-13

## 2021-10-21 ENCOUNTER — OFFICE VISIT (OUTPATIENT)
Dept: UROLOGY | Facility: CLINIC | Age: 61
End: 2021-10-21
Payer: COMMERCIAL

## 2021-10-21 VITALS
WEIGHT: 172 LBS | SYSTOLIC BLOOD PRESSURE: 128 MMHG | HEART RATE: 78 BPM | BODY MASS INDEX: 29.37 KG/M2 | HEIGHT: 64 IN | DIASTOLIC BLOOD PRESSURE: 78 MMHG

## 2021-10-21 DIAGNOSIS — R39.12 BENIGN PROSTATIC HYPERPLASIA WITH WEAK URINARY STREAM: Primary | ICD-10-CM

## 2021-10-21 DIAGNOSIS — Z12.5 SCREENING FOR PROSTATE CANCER: ICD-10-CM

## 2021-10-21 DIAGNOSIS — N40.1 BENIGN PROSTATIC HYPERPLASIA WITH WEAK URINARY STREAM: Primary | ICD-10-CM

## 2021-10-21 PROCEDURE — 3078F DIAST BP <80 MM HG: CPT | Performed by: PHYSICIAN ASSISTANT

## 2021-10-21 PROCEDURE — 3008F BODY MASS INDEX DOCD: CPT | Performed by: PHYSICIAN ASSISTANT

## 2021-10-21 PROCEDURE — 99213 OFFICE O/P EST LOW 20 MIN: CPT | Performed by: PHYSICIAN ASSISTANT

## 2021-10-21 PROCEDURE — 3074F SYST BP LT 130 MM HG: CPT | Performed by: PHYSICIAN ASSISTANT

## 2021-10-21 PROCEDURE — 1036F TOBACCO NON-USER: CPT | Performed by: PHYSICIAN ASSISTANT

## 2021-10-21 RX ORDER — TAMSULOSIN HYDROCHLORIDE 0.4 MG/1
0.4 CAPSULE ORAL
Qty: 90 CAPSULE | Refills: 3 | Status: SHIPPED | OUTPATIENT
Start: 2021-10-21

## 2021-10-25 ENCOUNTER — TELEPHONE (OUTPATIENT)
Dept: GASTROENTEROLOGY | Facility: HOSPITAL | Age: 61
End: 2021-10-25

## 2021-10-26 ENCOUNTER — HOSPITAL ENCOUNTER (OUTPATIENT)
Dept: GASTROENTEROLOGY | Facility: HOSPITAL | Age: 61
Setting detail: OUTPATIENT SURGERY
Discharge: HOME | End: 2021-10-26
Attending: INTERNAL MEDICINE
Payer: COMMERCIAL

## 2021-10-26 ENCOUNTER — ANESTHESIA (OUTPATIENT)
Dept: GASTROENTEROLOGY | Facility: HOSPITAL | Age: 61
End: 2021-10-26

## 2021-10-26 ENCOUNTER — ANESTHESIA EVENT (OUTPATIENT)
Dept: GASTROENTEROLOGY | Facility: HOSPITAL | Age: 61
End: 2021-10-26

## 2021-10-26 VITALS
DIASTOLIC BLOOD PRESSURE: 71 MMHG | HEIGHT: 64 IN | OXYGEN SATURATION: 99 % | SYSTOLIC BLOOD PRESSURE: 117 MMHG | BODY MASS INDEX: 30.83 KG/M2 | WEIGHT: 180.56 LBS | TEMPERATURE: 97.6 F | HEART RATE: 60 BPM | RESPIRATION RATE: 15 BRPM

## 2021-10-26 DIAGNOSIS — Z12.11 SCREEN FOR COLON CANCER: ICD-10-CM

## 2021-10-26 PROCEDURE — 45385 COLONOSCOPY W/LESION REMOVAL: CPT | Performed by: INTERNAL MEDICINE

## 2021-10-26 PROCEDURE — 88305 TISSUE EXAM BY PATHOLOGIST: CPT | Performed by: PATHOLOGY

## 2021-10-26 RX ORDER — PROPOFOL 10 MG/ML
INJECTION, EMULSION INTRAVENOUS AS NEEDED
Status: DISCONTINUED | OUTPATIENT
Start: 2021-10-26 | End: 2021-10-26

## 2021-10-26 RX ORDER — SODIUM CHLORIDE, SODIUM LACTATE, POTASSIUM CHLORIDE, CALCIUM CHLORIDE 600; 310; 30; 20 MG/100ML; MG/100ML; MG/100ML; MG/100ML
INJECTION, SOLUTION INTRAVENOUS CONTINUOUS PRN
Status: DISCONTINUED | OUTPATIENT
Start: 2021-10-26 | End: 2021-10-26

## 2021-10-26 RX ORDER — LIDOCAINE HYDROCHLORIDE 10 MG/ML
INJECTION, SOLUTION EPIDURAL; INFILTRATION; INTRACAUDAL; PERINEURAL AS NEEDED
Status: DISCONTINUED | OUTPATIENT
Start: 2021-10-26 | End: 2021-10-26

## 2021-10-26 RX ADMIN — PROPOFOL 200 MG: 10 INJECTION, EMULSION INTRAVENOUS at 10:51

## 2021-10-26 RX ADMIN — LIDOCAINE HYDROCHLORIDE 5 ML: 10 INJECTION, SOLUTION EPIDURAL; INFILTRATION; INTRACAUDAL; PERINEURAL at 10:51

## 2021-10-26 RX ADMIN — PROPOFOL 10 MG: 10 INJECTION, EMULSION INTRAVENOUS at 10:57

## 2021-10-26 RX ADMIN — PROPOFOL 10 MG: 10 INJECTION, EMULSION INTRAVENOUS at 10:56

## 2021-10-26 RX ADMIN — SODIUM CHLORIDE, SODIUM LACTATE, POTASSIUM CHLORIDE, AND CALCIUM CHLORIDE: .6; .31; .03; .02 INJECTION, SOLUTION INTRAVENOUS at 10:33

## 2021-12-02 ENCOUNTER — TELEPHONE (OUTPATIENT)
Dept: CARDIOLOGY CLINIC | Facility: CLINIC | Age: 61
End: 2021-12-02

## 2022-01-06 ENCOUNTER — OFFICE VISIT (OUTPATIENT)
Dept: CARDIOLOGY CLINIC | Facility: CLINIC | Age: 62
End: 2022-01-06
Payer: COMMERCIAL

## 2022-01-06 VITALS
BODY MASS INDEX: 32.61 KG/M2 | HEIGHT: 64 IN | WEIGHT: 191 LBS | OXYGEN SATURATION: 96 % | SYSTOLIC BLOOD PRESSURE: 114 MMHG | DIASTOLIC BLOOD PRESSURE: 70 MMHG | HEART RATE: 76 BPM | RESPIRATION RATE: 16 BRPM

## 2022-01-06 DIAGNOSIS — R07.89 CHEST PAIN, MID STERNAL: Primary | ICD-10-CM

## 2022-01-06 PROCEDURE — 1036F TOBACCO NON-USER: CPT | Performed by: INTERNAL MEDICINE

## 2022-01-06 PROCEDURE — 3078F DIAST BP <80 MM HG: CPT | Performed by: INTERNAL MEDICINE

## 2022-01-06 PROCEDURE — 3008F BODY MASS INDEX DOCD: CPT | Performed by: INTERNAL MEDICINE

## 2022-01-06 PROCEDURE — 3074F SYST BP LT 130 MM HG: CPT | Performed by: INTERNAL MEDICINE

## 2022-01-06 PROCEDURE — 99214 OFFICE O/P EST MOD 30 MIN: CPT | Performed by: INTERNAL MEDICINE

## 2022-01-06 NOTE — PROGRESS NOTES
Cardiology Office Note    Juliane Catalan 62 y o  male MRN: [id number]    01/06/22          Assessment:  1  Positional chest/mediastinal pain  2  Nonobstructive CAD  3  Family history of premature CAD  4  Hypertension  5  Hyperlipidemia      Plan:  · He has noted positional left-sided chest/mediastinal discomfort  Will evaluate with a CTA chest    · Continue aspirin, Bystolic, and Crestor  · Ambulatory blood pressure monitoring and maintaining a low sodium diet was advised  · He was advised to notify us with the onset of cardiac symptoms    Follow up: 6 months or sooner as needed    1  Chest pain, mid sternal  CTA chest wo w contrast       HPI: Juliane Catalan is a 62y o  year old male with history of nonobstructive CAD, family history of premature CAD, hypertension hyperlipidemia presents for routine follow-up  Past cardiac evaluation:   · Cardiac catheterization 4/2018: nonobstructive CAD  · TTE 2/2018: EF: 60% with no significant valvular heart disease  He has been doing well from a cardiac standpoint since his last evaluation  He has noted positional left-sided chest discomfort when he lies on his left side  He denies exertional chest pain or dyspnea  He walks 10-15k steps daily without limitation  He denies any other cardiac concerns at this time  Family History: father passed from MI at 40; paternal uncle with CAD s/p MI at 77; brother with CAD s/p PCI at 52years old    Social history: denies tobacco and recreational drug use  Social alcohol use  Patient Active Problem List   Diagnosis    Coronary arteriosclerosis in native artery    Gastroesophageal reflux disease without esophagitis    Hyperlipidemia LDL goal <100    Essential hypertension    Obesity (BMI 30 0-34  9)       Allergies   Allergen Reactions    Dilaudid [Hydromorphone Hcl] Other (See Comments)     Pt states he had a bad experience, blood pressure dropped          Current Outpatient Medications:     aspirin 81 MG tablet, Take by mouth daily , Disp: , Rfl:     Bystolic 5 MG tablet, TAKE 1 TABLET DAILY, Disp: 90 tablet, Rfl: 3    Coenzyme Q10 (CO Q 10) 10 MG CAPS, Take by mouth daily , Disp: , Rfl:     glucosamine-chondroitin 500-400 MG tablet, Take 1 tablet by mouth 2 (two) times a day  , Disp: , Rfl:     loratadine (CLARITIN) 10 mg tablet, Take 10 mg by mouth daily, Disp: , Rfl:     Misc Natural Products (TART CHERRY ADVANCED PO), Take by mouth, Disp: , Rfl:     multivitamin (THERAGRAN) TABS, Take 1 tablet by mouth daily, Disp: , Rfl:     Omega-3 Fatty Acids ([employer] FISH OIL) 1200 MG CAPS, CVS Fish Oil 1200 MG Oral Capsule; daily, Disp: , Rfl:     rosuvastatin (CRESTOR) 20 MG tablet, TAKE 1 TABLET DAILY, Disp: 90 tablet, Rfl: 0    tamsulosin (FLOMAX) 0 4 mg, Take 1 capsule (0 4 mg total) by mouth daily with dinner, Disp: 90 capsule, Rfl: 3    TURMERIC PO, Take by mouth, Disp: , Rfl:     Past Medical History:   Diagnosis Date    CAD (coronary artery disease) 2009    50% RCA stenosis on coronary angiogram     GERD (gastroesophageal reflux disease)     Hyperlipidemia     Hypertension     Obesity     Schatzki's ring 02/07/2014       Family History   Problem Relation Age of Onset    Coronary artery disease Father     Heart attack Father 40    Arrhythmia Father     Cancer Maternal Aunt         GI       Past Surgical History:   Procedure Laterality Date    CARDIAC CATHETERIZATION  2009, 2018    COLONOSCOPY  10/13/2011    ESOPHAGOGASTRODUODENOSCOPY         Social History     Socioeconomic History    Marital status: /Civil Union     Spouse name: Not on file    Number of children: Not on file    Years of education: Not on file    Highest education level: Not on file   Occupational History    Not on file   Tobacco Use    Smoking status: Never Smoker    Smokeless tobacco: Never Used   Vaping Use    Vaping Use: Never used   Substance and Sexual Activity    Alcohol use: Yes     Comment: socially    Drug use: No    Sexual activity: Yes     Partners: Female   Other Topics Concern    Not on file   Social History Narrative    Not on file     Social Determinants of Health     Financial Resource Strain: Not on file   Food Insecurity: Not on file   Transportation Needs: Not on file   Physical Activity: Inactive    Days of Exercise per Week: 0 days    Minutes of Exercise per Session: 0 min   Stress: No Stress Concern Present    Feeling of Stress : Not at all   Social Connections: Not on file   Intimate Partner Violence: Not on file   Housing Stability: Not on file       Review of Systems   Constitutional: Negative for diaphoresis, weight gain and weight loss  HENT: Negative for congestion  Cardiovascular: Negative for chest pain, dyspnea on exertion, irregular heartbeat, leg swelling, near-syncope, orthopnea, palpitations, paroxysmal nocturnal dyspnea and syncope  Respiratory: Negative for shortness of breath, sleep disturbances due to breathing and snoring  Hematologic/Lymphatic: Does not bruise/bleed easily  Skin: Negative for rash  Musculoskeletal: Positive for myalgias  Gastrointestinal: Negative for nausea and vomiting  Neurological: Negative for excessive daytime sleepiness and light-headedness  Psychiatric/Behavioral: The patient is not nervous/anxious  Vitals: /70   Pulse 76   Resp 16   Ht 5' 4" (1 626 m)   Wt 86 6 kg (191 lb)   SpO2 96%   BMI 32 79 kg/m²       Physical Exam:     GEN: Alert and oriented x 3, in no acute distress  Well appearing and well nourished  HEENT: Sclera anicteric, conjunctivae pink, mucous membranes moist  Oropharynx clear  NECK: Supple, no carotid bruits, no significant JVD  Trachea midline, no thyromegaly  HEART: Regular rhythm, normal S1 and S2, no murmurs, clicks, gallops or rubs  PMI nondisplaced, no thrills  LUNGS: Clear to auscultation bilaterally; no wheezes, rales, or rhonchi   No increased work of breathing or signs of respiratory distress  ABDOMEN: Soft, nontender, nondistended, normoactive bowel sounds  EXTREMITIES: Skin warm and well perfused, no clubbing, cyanosis, or edema  NEURO: No focal findings  Normal speech  Mood and affect normal    SKIN: Normal without suspicious lesions on exposed skin

## 2022-02-02 ENCOUNTER — HOSPITAL ENCOUNTER (OUTPATIENT)
Dept: CT IMAGING | Facility: CLINIC | Age: 62
Discharge: HOME | End: 2022-02-02
Payer: COMMERCIAL

## 2022-02-02 DIAGNOSIS — R07.89 CHEST PAIN, MID STERNAL: ICD-10-CM

## 2022-02-02 PROCEDURE — G1004 CDSM NDSC: HCPCS

## 2022-02-02 PROCEDURE — 71275 CT ANGIOGRAPHY CHEST: CPT

## 2022-02-02 RX ADMIN — IOHEXOL 100 ML: 350 INJECTION, SOLUTION INTRAVENOUS at 12:38

## 2022-02-04 ENCOUNTER — TELEPHONE (OUTPATIENT)
Dept: OTHER | Facility: OTHER | Age: 62
End: 2022-02-04

## 2022-02-04 ENCOUNTER — TELEPHONE (OUTPATIENT)
Dept: CARDIOLOGY CLINIC | Facility: CLINIC | Age: 62
End: 2022-02-04

## 2022-02-04 NOTE — RESULT ENCOUNTER NOTE
Results provided to the patient  He was advised to reach out to his PCP for further evaluation of his hepatic nodule

## 2022-02-04 NOTE — TELEPHONE ENCOUNTER
Name of Caller:Rmaon    Problem/Symptoms:Had an aortic CT scan and saw results on mychart and has questions       Call back number:[phone]    Last or Next appt:

## 2022-02-04 NOTE — TELEPHONE ENCOUNTER
Patient called in stating a recent CT scan showed a nodule on liver and he would like to speak with the doctor about this

## 2022-02-07 PROBLEM — K76.89 LIVER NODULE: Status: ACTIVE | Noted: 2022-02-07

## 2022-02-07 NOTE — TELEPHONE ENCOUNTER
Spoke with the patient    He was informed that he has to do MRI of the abdomen to evaluate the hepatic nodule

## 2022-02-10 ENCOUNTER — OFFICE VISIT (OUTPATIENT)
Dept: DERMATOLOGY | Facility: CLINIC | Age: 62
End: 2022-02-10
Payer: COMMERCIAL

## 2022-02-10 VITALS — TEMPERATURE: 98.2 F | BODY MASS INDEX: 32.61 KG/M2 | WEIGHT: 190 LBS

## 2022-02-10 DIAGNOSIS — Z13.89 SCREENING FOR SKIN CONDITION: ICD-10-CM

## 2022-02-10 DIAGNOSIS — L82.1 SEBORRHEIC KERATOSIS: Primary | ICD-10-CM

## 2022-02-10 PROCEDURE — 99212 OFFICE O/P EST SF 10 MIN: CPT | Performed by: DERMATOLOGY

## 2022-02-10 PROCEDURE — 1036F TOBACCO NON-USER: CPT | Performed by: DERMATOLOGY

## 2022-02-10 NOTE — PROGRESS NOTES
[facility] DERMATOLOGY  [address] [place] [id number]  [phone]  [phone]     MRN: [id number] : 1960  Encounter: [id number]  Patient Information: Osvaldo Postal  Chief complaint: yearly check up    History of present illness:  62Year old male presents for overall skin check concerned regarding potential skin cancer no specific concerns noted  Past Medical History:   Diagnosis Date    CAD (coronary artery disease)     50% RCA stenosis on coronary angiogram     GERD (gastroesophageal reflux disease)     Hyperlipidemia     Hypertension     Obesity     Schatzki's ring 2014     Past Surgical History:   Procedure Laterality Date    CARDIAC CATHETERIZATION  ,     COLONOSCOPY  10/13/2011    ESOPHAGOGASTRODUODENOSCOPY       Social History   Social History     Substance and Sexual Activity   Alcohol Use Yes    Comment: socially     Social History     Substance and Sexual Activity   Drug Use No     Social History     Tobacco Use   Smoking Status Never Smoker   Smokeless Tobacco Never Used     Family History   Problem Relation Age of Onset    Coronary artery disease Father     Heart attack Father 40    Arrhythmia Father     Cancer Maternal Aunt         GI     Meds/Allergies   Allergies   Allergen Reactions    Dilaudid [Hydromorphone Hcl] Other (See Comments)     Pt states he had a bad experience, blood pressure dropped        Meds:  Prior to Admission medications    Medication Sig Start Date End Date Taking?  Authorizing Provider   aspirin 81 MG tablet Take by mouth daily    Yes Historical Provider, MD   Bystolic 5 MG tablet TAKE 1 TABLET DAILY 21  Yes Arben Catalan MD   Coenzyme Q10 (CO Q 10) 10 MG CAPS Take by mouth daily    Yes Historical Provider, MD   glucosamine-chondroitin 500-400 MG tablet Take 1 tablet by mouth 2 (two) times a day     Yes Historical Provider, MD   loratadine (CLARITIN) 10 mg tablet Take 10 mg by mouth daily   Yes Historical Provider, MD   Misc Natural Products (TART CHERRY ADVANCED PO) Take by mouth   Yes Historical Provider, MD   multivitamin (THERAGRAN) TABS Take 1 tablet by mouth daily   Yes Historical Provider, MD   Omega-3 Fatty Acids (CVS FISH OIL) 1200 MG CAPS CVS Fish Oil 1200 MG Oral Capsule; daily   Yes Historical Provider, MD   rosuvastatin (CRESTOR) 20 MG tablet TAKE 1 TABLET DAILY 12/2/21  Yes John Jarvis MD   tamsulosin (FLOMAX) 0 4 mg Take 1 capsule (0 4 mg total) by mouth daily with dinner 10/21/21  Yes Kendra Blizzard, PA-C   TURMERIC PO Take by mouth   Yes Historical Provider, MD       Subjective:     Review of Systems:    General: negative for - chills, fatigue, fever,  weight gain or weight loss  Psychological: negative for - anxiety, behavioral disorder, concentration difficulties, decreased libido, depression, irritability, memory difficulties, mood swings, sleep disturbances or suicidal ideation  ENT: negative for - hearing difficulties , nasal congestion, nasal discharge, oral lesions, sinus pain, sneezing, sore throat  Allergy and Immunology: negative for - hives, insect bite sensitivity,  Hematological and Lymphatic: negative for - bleeding problems, blood clots,bruising, swollen lymph nodes  Endocrine: negative for - hair pattern changes, hot flashes, malaise/lethargy, mood swings, palpitations, polydipsia/polyuria, skin changes, temperature intolerance or unexpected weight change  Respiratory: negative for - cough, hemoptysis, orthopnea, shortness of breath, or wheezing  Cardiovascular: negative for - chest pain, dyspnea on exertion, edema,  Gastrointestinal: negative for - abdominal pain, nausea/vomiting  Genito-Urinary: negative for - dysuria, incontinence, irregular/heavy menses or urinary frequency/urgency  Musculoskeletal: negative for - gait disturbance, joint pain, joint stiffness, joint swelling, muscle pain, muscular weakness  Dermatological:  As in HPI  Neurological: negative for confusion, dizziness, headaches, impaired coordination/balance, memory loss, numbness/tingling, seizures, speech problems, tremors or weakness       Objective:   Temp 98 2 °F (36 8 °C) (Temporal)   Wt 86 2 kg (190 lb)   BMI 32 61 kg/m²     Physical Exam:    General Appearance:    Alert, cooperative, no distress   Head:    Normocephalic, without obvious abnormality, atraumatic           Skin:   A full skin exam was performed including scalp, head scalp, eyes, ears, nose, lips, neck, chest, axilla, abdomen, back, buttocks, bilateral upper extremities, bilateral lower extremities, hands, feet, fingers, toes, fingernails, and toenails normal keratotic papules greasy stuck appearance nothing else remarkable noted exam     Assessment:     1  Seborrheic keratosis     2  Screening for skin condition           Plan:   Seborrheic Keratosis  Patient reasurred these are normal growths we acquire with age no treatment needed  Screening for Dermatologic Disorders: Nothing else of concern noted on complete exam follow up in 1 year       Lydia Navarrete MD  2/10/2022,8:18 AM    Portions of the record may have been created with voice recognition software   Occasional wrong word or "sound a like" substitutions may have occurred due to the inherent limitations of voice recognition software   Read the chart carefully and recognize, using context, where substitutions have occurred

## 2022-02-28 ENCOUNTER — APPOINTMENT (OUTPATIENT)
Dept: LAB | Facility: CLINIC | Age: 62
End: 2022-02-28
Payer: COMMERCIAL

## 2022-02-28 DIAGNOSIS — E78.5 HYPERLIPIDEMIA LDL GOAL <100: Chronic | ICD-10-CM

## 2022-02-28 LAB
ALBUMIN SERPL BCP-MCNC: 4.2 G/DL (ref 3.5–5)
ALP SERPL-CCNC: 45 U/L (ref 46–116)
ALT SERPL W P-5'-P-CCNC: 39 U/L (ref 12–78)
ANION GAP SERPL CALCULATED.3IONS-SCNC: 3 MMOL/L (ref 4–13)
AST SERPL W P-5'-P-CCNC: 20 U/L (ref 5–45)
BILIRUB SERPL-MCNC: 1.49 MG/DL (ref 0.2–1)
BUN SERPL-MCNC: 17 MG/DL (ref 5–25)
CALCIUM SERPL-MCNC: 9 MG/DL (ref 8.3–10.1)
CHLORIDE SERPL-SCNC: 110 MMOL/L (ref 100–108)
CO2 SERPL-SCNC: 27 MMOL/L (ref 21–32)
CREAT SERPL-MCNC: 1 MG/DL (ref 0.6–1.3)
GFR SERPL CREATININE-BSD FRML MDRD: 81 ML/MIN/1.73SQ M
GLUCOSE P FAST SERPL-MCNC: 95 MG/DL (ref 65–99)
POTASSIUM SERPL-SCNC: 4 MMOL/L (ref 3.5–5.3)
PROT SERPL-MCNC: 7.8 G/DL (ref 6.4–8.2)
SODIUM SERPL-SCNC: 140 MMOL/L (ref 136–145)

## 2022-02-28 PROCEDURE — 80053 COMPREHEN METABOLIC PANEL: CPT

## 2022-02-28 PROCEDURE — 36415 COLL VENOUS BLD VENIPUNCTURE: CPT

## 2022-03-02 ENCOUNTER — APPOINTMENT (OUTPATIENT)
Dept: LAB | Facility: CLINIC | Age: 62
End: 2022-03-02
Payer: COMMERCIAL

## 2022-03-02 ENCOUNTER — HOSPITAL ENCOUNTER (OUTPATIENT)
Dept: MRI IMAGING | Facility: CLINIC | Age: 62
Discharge: HOME | End: 2022-03-02
Payer: COMMERCIAL

## 2022-03-02 DIAGNOSIS — E78.5 HYPERLIPIDEMIA LDL GOAL <100: Chronic | ICD-10-CM

## 2022-03-02 DIAGNOSIS — E78.5 HYPERLIPIDEMIA, UNSPECIFIED HYPERLIPIDEMIA TYPE: ICD-10-CM

## 2022-03-02 DIAGNOSIS — K76.89 LIVER NODULE: ICD-10-CM

## 2022-03-02 DIAGNOSIS — Z12.5 SCREENING FOR PROSTATE CANCER: ICD-10-CM

## 2022-03-02 LAB
BASOPHILS # BLD AUTO: 0.05 THOUSANDS/ΜL (ref 0–0.1)
BASOPHILS NFR BLD AUTO: 1 % (ref 0–1)
CHOLEST SERPL-MCNC: 151 MG/DL
EOSINOPHIL # BLD AUTO: 0.09 THOUSAND/ΜL (ref 0–0.61)
EOSINOPHIL NFR BLD AUTO: 1 % (ref 0–6)
ERYTHROCYTE [DISTWIDTH] IN BLOOD BY AUTOMATED COUNT: 13.3 % (ref 11.6–15.1)
HCT VFR BLD AUTO: 46.2 % (ref 36.5–49.3)
HDLC SERPL-MCNC: 82 MG/DL
HGB BLD-MCNC: 15.8 G/DL (ref 12–17)
IMM GRANULOCYTES # BLD AUTO: 0.03 THOUSAND/UL (ref 0–0.2)
IMM GRANULOCYTES NFR BLD AUTO: 0 % (ref 0–2)
LDLC SERPL CALC-MCNC: 55 MG/DL (ref 0–100)
LYMPHOCYTES # BLD AUTO: 1.62 THOUSANDS/ΜL (ref 0.6–4.47)
LYMPHOCYTES NFR BLD AUTO: 19 % (ref 14–44)
MCH RBC QN AUTO: 30.2 PG (ref 26.8–34.3)
MCHC RBC AUTO-ENTMCNC: 34.2 G/DL (ref 31.4–37.4)
MCV RBC AUTO: 88 FL (ref 82–98)
MONOCYTES # BLD AUTO: 0.64 THOUSAND/ΜL (ref 0.17–1.22)
MONOCYTES NFR BLD AUTO: 8 % (ref 4–12)
NEUTROPHILS # BLD AUTO: 6.01 THOUSANDS/ΜL (ref 1.85–7.62)
NEUTS SEG NFR BLD AUTO: 71 % (ref 43–75)
NONHDLC SERPL-MCNC: 69 MG/DL
NRBC BLD AUTO-RTO: 0 /100 WBCS
PLATELET # BLD AUTO: 281 THOUSANDS/UL (ref 149–390)
PMV BLD AUTO: 10.5 FL (ref 8.9–12.7)
PSA SERPL-MCNC: 0.7 NG/ML (ref 0–4)
RBC # BLD AUTO: 5.23 MILLION/UL (ref 3.88–5.62)
TRIGL SERPL-MCNC: 72 MG/DL
TSH SERPL DL<=0.05 MIU/L-ACNC: 2.55 UIU/ML (ref 0.36–3.74)
WBC # BLD AUTO: 8.44 THOUSAND/UL (ref 4.31–10.16)

## 2022-03-02 PROCEDURE — G0103 PSA SCREENING: HCPCS

## 2022-03-02 PROCEDURE — 36415 COLL VENOUS BLD VENIPUNCTURE: CPT

## 2022-03-02 PROCEDURE — 80061 LIPID PANEL: CPT

## 2022-03-02 PROCEDURE — G1004 CDSM NDSC: HCPCS

## 2022-03-02 PROCEDURE — 84153 ASSAY OF PSA TOTAL: CPT

## 2022-03-02 PROCEDURE — 85025 COMPLETE CBC W/AUTO DIFF WBC: CPT

## 2022-03-02 PROCEDURE — 74183 MRI ABD W/O CNTR FLWD CNTR: CPT

## 2022-03-02 PROCEDURE — 84443 ASSAY THYROID STIM HORMONE: CPT

## 2022-03-02 PROCEDURE — A9585 GADOBUTROL INJECTION: HCPCS | Performed by: INTERNAL MEDICINE

## 2022-03-02 RX ORDER — ROSUVASTATIN CALCIUM 20 MG/1
TABLET, COATED ORAL
Qty: 90 TABLET | Refills: 3 | Status: SHIPPED | OUTPATIENT
Start: 2022-03-02

## 2022-03-02 RX ADMIN — GADOBUTROL 9 ML: 604.72 INJECTION INTRAVENOUS at 14:03

## 2022-03-08 ENCOUNTER — OFFICE VISIT (OUTPATIENT)
Dept: INTERNAL MEDICINE CLINIC | Facility: CLINIC | Age: 62
End: 2022-03-08
Payer: COMMERCIAL

## 2022-03-08 ENCOUNTER — TELEPHONE (OUTPATIENT)
Dept: INTERNAL MEDICINE CLINIC | Facility: CLINIC | Age: 62
End: 2022-03-08

## 2022-03-08 VITALS
SYSTOLIC BLOOD PRESSURE: 120 MMHG | BODY MASS INDEX: 31.96 KG/M2 | WEIGHT: 187.2 LBS | OXYGEN SATURATION: 98 % | HEIGHT: 64 IN | TEMPERATURE: 98.4 F | HEART RATE: 64 BPM | DIASTOLIC BLOOD PRESSURE: 80 MMHG

## 2022-03-08 DIAGNOSIS — E66.9 OBESITY (BMI 30.0-34.9): Chronic | ICD-10-CM

## 2022-03-08 DIAGNOSIS — I10 ESSENTIAL HYPERTENSION: Primary | Chronic | ICD-10-CM

## 2022-03-08 DIAGNOSIS — E78.5 HYPERLIPIDEMIA LDL GOAL <100: Chronic | ICD-10-CM

## 2022-03-08 DIAGNOSIS — K76.89 LIVER NODULE: ICD-10-CM

## 2022-03-08 DIAGNOSIS — R39.12 BENIGN PROSTATIC HYPERPLASIA WITH WEAK URINARY STREAM: ICD-10-CM

## 2022-03-08 DIAGNOSIS — I25.10 CORONARY ARTERIOSCLEROSIS IN NATIVE ARTERY: ICD-10-CM

## 2022-03-08 DIAGNOSIS — N40.1 BENIGN PROSTATIC HYPERPLASIA WITH WEAK URINARY STREAM: ICD-10-CM

## 2022-03-08 PROCEDURE — 3074F SYST BP LT 130 MM HG: CPT | Performed by: INTERNAL MEDICINE

## 2022-03-08 PROCEDURE — 3725F SCREEN DEPRESSION PERFORMED: CPT | Performed by: INTERNAL MEDICINE

## 2022-03-08 PROCEDURE — 99214 OFFICE O/P EST MOD 30 MIN: CPT | Performed by: INTERNAL MEDICINE

## 2022-03-08 PROCEDURE — 3079F DIAST BP 80-89 MM HG: CPT | Performed by: INTERNAL MEDICINE

## 2022-03-08 PROCEDURE — 3008F BODY MASS INDEX DOCD: CPT | Performed by: INTERNAL MEDICINE

## 2022-03-08 PROCEDURE — 1036F TOBACCO NON-USER: CPT | Performed by: INTERNAL MEDICINE

## 2022-03-08 NOTE — TELEPHONE ENCOUNTER
----- Message from Arin Lopez MD sent at 3/8/2022  5:34 PM EST -----   MRI of the abdomen shows hemangioma in the liver which is a benign tumor    Incidentally there is cyst in the left lobe, but no suspicious lesion seen

## 2022-03-08 NOTE — PROGRESS NOTES
INTERNAL MEDICINE OFFICE VISIT  [facility] AT [address]  Tel: [phone]      NAME: Juliane Catalan  AGE: 62 y o  SEX: male  : 1960   MRN: [id number]    DATE: 3/8/2022  TIME: 8:22 AM      Assessment and Plan:  1  Essential hypertension    Continue Bystolic    2  Hyperlipidemia LDL goal <100   continue Crestor and cut down on the fat intake  - CBC and differential; Future  - Comprehensive metabolic panel; Future  - Lipid panel; Future  - TSH, 3rd generation; Future    3  Coronary arteriosclerosis in native artery   follow-up with cardiology    4  Liver nodule   he had an MRI of the abdomen which is not reported yet  Will follow up with the results    5  Benign prostatic hyperplasia with weak urinary stream   follow-up with Urology and continue Flomax    6  Obesity (BMI 30 0-34  9)  BMI Counseling: Body mass index is 32 13 kg/m²  The BMI is above normal  Nutrition recommendations include decreasing portion sizes, encouraging healthy choices of fruits and vegetables and moderation in carbohydrate intake  Exercise recommendations include moderate physical activity 150 minutes/week  Rationale for BMI follow-up plan is due to patient being overweight or obese  Depression Screening and Follow-up Plan: Patient was screened for depression during today's encounter  They screened negative with a PHQ-2 score of 0           - Counseling Documentation: patient was counseled regarding: diagnostic results, instructions for management, risk factor reductions, prognosis, patient and family education, risks and benefits of treatment options and importance of compliance with treatment  - Medication Side Effects: Adverse side effects of medications were reviewed with the patient/guardian today  Return for follow up visit in  6 months or earlier, if needed        Chief Complaint:  Chief Complaint   Patient presents with    Follow-up History of Present Illness:    blood pressure is very well controlled on medication  Cholesterol is also within normal range   He follows up with cardiology regularly   He recently had a CT of the chest which showed a nodule in the liver and now he did MRI of the abdomen to follow up on the nodule  He had some urinary symptoms and went to the urologist and was started on Flomax, is feeling better now   Needs to lose some weight      Active Problem List:  Patient Active Problem List   Diagnosis    Coronary arteriosclerosis in native artery    Gastroesophageal reflux disease without esophagitis    Hyperlipidemia LDL goal <100    Essential hypertension    Obesity (BMI 30 0-34  9)    Liver nodule    Benign prostatic hyperplasia with weak urinary stream         Past Medical History:  Past Medical History:   Diagnosis Date    CAD (coronary artery disease) 2009    50% RCA stenosis on coronary angiogram     GERD (gastroesophageal reflux disease)     Hyperlipidemia     Hypertension     Obesity     Schatzki's ring 02/07/2014         Past Surgical History:  Past Surgical History:   Procedure Laterality Date    CARDIAC CATHETERIZATION  2009, 2018    COLONOSCOPY  10/13/2011    ESOPHAGOGASTRODUODENOSCOPY           Family History:  Family History   Problem Relation Age of Onset    Coronary artery disease Father     Heart attack Father 40    Arrhythmia Father     Cancer Maternal Aunt         GI         Social History:  Social History     Socioeconomic History    Marital status: /Civil Union     Spouse name: None    Number of children: None    Years of education: None    Highest education level: None   Occupational History    None   Tobacco Use    Smoking status: Never Smoker    Smokeless tobacco: Never Used   Vaping Use    Vaping Use: Never used   Substance and Sexual Activity    Alcohol use: Yes     Comment: socially    Drug use: No    Sexual activity: Yes     Partners: Female Other Topics Concern    None   Social History Narrative    None     Social Determinants of Health     Financial Resource Strain: Not on file   Food Insecurity: Not on file   Transportation Needs: Not on file   Physical Activity: Not on file   Stress: Not on file   Social Connections: Not on file   Intimate Partner Violence: Not on file   Housing Stability: Not on file         Allergies:   Allergies   Allergen Reactions    Dilaudid [Hydromorphone Hcl] Other (See Comments)     Pt states he had a bad experience, blood pressure dropped          Medications:    Current Outpatient Medications:     aspirin 81 MG tablet, Take by mouth daily , Disp: , Rfl:     Bystolic 5 MG tablet, TAKE 1 TABLET DAILY, Disp: 90 tablet, Rfl: 3    Coenzyme Q10 (CO Q 10) 10 MG CAPS, Take by mouth daily , Disp: , Rfl:     glucosamine-chondroitin 500-400 MG tablet, Take 1 tablet by mouth 2 (two) times a day  , Disp: , Rfl:     loratadine (CLARITIN) 10 mg tablet, Take 10 mg by mouth daily, Disp: , Rfl:     Misc Natural Products (TART CHERRY ADVANCED PO), Take by mouth, Disp: , Rfl:     multivitamin (THERAGRAN) TABS, Take 1 tablet by mouth daily, Disp: , Rfl:     Omega-3 Fatty Acids ([employer] FISH OIL) 1200 MG CAPS, CVS Fish Oil 1200 MG Oral Capsule; daily, Disp: , Rfl:     rosuvastatin (CRESTOR) 20 MG tablet, TAKE 1 TABLET DAILY (NEED APPOINTMENT FOR FURTHER REFILLS), Disp: 90 tablet, Rfl: 3    tamsulosin (FLOMAX) 0 4 mg, Take 1 capsule (0 4 mg total) by mouth daily with dinner, Disp: 90 capsule, Rfl: 3    TURMERIC PO, Take by mouth, Disp: , Rfl:       The following portions of the patient's history were reviewed and updated as appropriate: past medical history, past surgical history, family history, social history, allergies, current medications and active problem list       Review of Systems:  Constitutional: Denies fever, chills, weight gain, weight loss, fatigue  Eyes: Denies eye redness, eye discharge, double vision, change in visual acuity  ENT: Denies hearing loss, tinnitus, sneezing, nasal congestion, nasal discharge, sore throat   Respiratory: Denies cough, expectoration, hemoptysis, shortness of breath, wheezing  Cardiovascular: Denies chest pain, palpitations, lower extremity swelling, orthopnea, PND  Gastrointestinal: Denies abdominal pain, heartburn, nausea, vomiting, hematemesis, diarrhea, bloody stools  Genito-Urinary: Denies dysuria, frequency, difficulty in micturition, nocturia, incontinence  Musculoskeletal: Denies back pain, joint pain, muscle pain  Neurologic: Denies confusion, lightheadedness, syncope, headache, focal weakness, sensory changes, seizures  Endocrine: Denies polyuria, polydipsia, temperature intolerance  Allergy and Immunology: Denies hives, insect bite sensitivity  Hematological and Lymphatic: Denies bleeding problems, swollen glands   Psychological: Denies depression, suicidal ideation, anxiety, panic, mood swings  Dermatological: Denies pruritus, rash, skin lesion changes      Vitals:  Vitals:    03/08/22 0748   BP: 120/80   Pulse: 64   Temp: 98 4 °F (36 9 °C)   SpO2: 98%       Body mass index is 32 13 kg/m²  Weight (last 2 days)     Date/Time Weight    03/08/22 0748 84 9 (187 2)            Physical Examination:  General: Patient is not in acute distress  Awake, alert, responding to commands  No weight gain or loss  Head: Normocephalic  Atraumatic  Eyes: Conjunctiva and lids with no swelling, erythema or discharge  Both pupils normal sized, round and reactive to light  Sclera nonicteric  ENT: External examination of nose and ear normal  Otoscopic examination shows translucent tympanic membranes with patent canals without erythema  Oropharynx moist with no erythema, edema, exudate or lesions  Neck: Supple  JVP not raised  Trachea midline  No masses  No thyromegaly  Lungs: No signs of increased work of breathing or respiratory distress   Bilateral bronchovascular breath sounds with no crackles or rhonchi  Chest wall: No tenderness  Cardiovascular: Normal PMI  No thrills  Regular rate and rhythm  S1 and S2 normal  No murmur, rub or gallop  Gastrointestinal: Abdomen soft, nontender  No guarding or rigidity  Liver and spleen not palpable  Bowel sounds present  Neurologic: Cranial nerves II-XII intact   Cortical functions normal  Motor system - Reflexes 2+ and symmetrical  Sensations normal  Musculoskeletal: Gait normal  No joint tenderness  Integumentary: Skin normal with no rash or lesions  Lymphatic: No palpable lymph nodes in neck, axilla or groin  Extremities: No clubbing, cyanosis, edema or varicosities  Psychological: Judgement and insight normal  Mood and affect normal      Laboratory Results:  CBC with diff:   Lab Results   Component Value Date    WBC 8 44 03/02/2022    WBC 8 4 08/07/2014    RBC 5 23 03/02/2022    RBC 4 97 08/07/2014    HGB 15 8 03/02/2022    HGB 14 5 08/07/2014    HCT 46 2 03/02/2022    HCT 43 8 08/07/2014    MCV 88 03/02/2022    MCV 88 08/07/2014    MCH 30 2 03/02/2022    MCH 29 3 08/07/2014    RDW 13 3 03/02/2022    RDW 13 2 08/07/2014     03/02/2022     08/07/2014       CMP:  Lab Results   Component Value Date    CREATININE 1 00 02/28/2022    CREATININE 0 8 08/07/2014    BUN 17 02/28/2022    BUN 12 08/07/2014     08/07/2014    K 4 0 02/28/2022    K 3 8 08/07/2014     (H) 02/28/2022     08/07/2014    CO2 27 02/28/2022    CO2 24 2 08/07/2014    GLUCOSE 78 08/07/2014    PROT 7 0 08/07/2014    ALKPHOS 45 (L) 02/28/2022    ALKPHOS 83 08/07/2014    ALT 39 02/28/2022    ALT 28 08/07/2014    AST 20 02/28/2022    AST 21 08/07/2014       No results found for: HGBA1C, MG, PHOS    No results found for: TROPONINI, CKMB, CKTOTAL    Lipid Profile:   Lab Results   Component Value Date    CHOL 138 08/07/2014     Lab Results   Component Value Date    HDL 82 03/02/2022    HDL 74 08/25/2021     Lab Results   Component Value Date    LDLCALC 55 03/02/2022    LDLCALC 79 08/25/2021     Lab Results   Component Value Date    TRIG 72 03/02/2022    TRIG 70 08/25/2021       Imaging Results:  CTA chest wo w contrast  Narrative: CT ANGIOGRAM OF THE CHEST, WITH AND WITHOUT IV CONTRAST    INDICATION:   R07 89: Other chest pain  COMPARISON: None  TECHNIQUE:  CT angiogram examination of the chest was performed according to standard protocol  Contrast as well as noncontrast images were obtained  This examination, like all CT scans performed in the [facility], was performed   utilizing techniques to minimize radiation dose exposure, including the use of iterative reconstruction and automated exposure control  3D reconstructions were performed an independent workstation, and are supplied for review  Rad dose 1398 mGy-cm     IV Contrast:  100 mL of iohexol (OMNIPAQUE)     FINDINGS:     AORTA/ARTERIAL VASCULATURE:  There is no aortic dissection or intramural hematoma  There is no aortic aneurysm  The ascending aorta measures 30 mm  There is classic branching anatomy of the aortic arch without stenoses in the visualized great vessels  There is minimal atherosclerotic disease  The visualized abdominal aorta is unremarkable  The celiac artery, superior mesenteric artery, and bilateral renal arteries are patent  OTHER FINDINGS:    LUNGS:  Lungs are clear  There is no tracheal or endobronchial lesion  PLEURA:  Unremarkable  HEART/PULMONARY ARTERIAL TREE:  Unremarkable for patient's age  MEDIASTINUM AND DARION:  Unremarkable  CHEST WALL AND LOWER NECK:   Unremarkable  VISUALIZED STRUCTURES IN THE UPPER ABDOMEN:  1 0 x 1 0 cm hypodense nodule in the posterior right lobe of the liver on precontrast images with enhancement during the arterial phase which likely represents an atypical hemangioma  Triple phase imaging can   be performed if indicated      VISUALIZED OSSEOUS STRUCTURES:  Mild compression deformity of the L1 vertebral body of indeterminate age   Impression: Nonspecific hypervascular hepatic nodule likely representing an atypical hemangioma which can be confirmed with triple phase imaging  Age indeterminate L1 compression fracture  No significant vascular abnormality      Workstation performed: [id number]       Health Maintenance:  Health Maintenance   Topic Date Due    DTaP,Tdap,and Td Vaccines (1 - Tdap) Never done    Annual Physical  06/13/2020    Influenza Vaccine (1) 09/01/2021    COVID-19 Vaccine (3 - Booster for Moderna series) 09/15/2021    BMI: Followup Plan  02/25/2022    Depression Screening  03/08/2023    BMI: Adult  03/08/2023    Colorectal Cancer Screening  10/25/2026    Hepatitis C Screening  Completed    Pneumococcal Vaccine: Pediatrics (0 to 5 Years) and At-Risk Patients (6 to 59 Years)  Aged Out    HIB Vaccine  Aged Out    Hepatitis B Vaccine  Aged Out    IPV Vaccine  Aged Out    Hepatitis A Vaccine  Aged Out    Meningococcal ACWY Vaccine  Aged Out    HPV Vaccine  Aged Out    HIV Screening  Discontinued     Immunization History   Administered Date(s) Administered    COVID-19 MODERNA VACC 0 5 ML IM 03/17/2021, 04/15/2021    INFLUENZA 12/16/2008, 09/17/2018    Influenza, injectable, quadrivalent, preservative free 0 5 mL 09/17/2018, 09/23/2019    Influenza, seasonal, injectable 10/01/2017    Pneumococcal Polysaccharide PPV23 12/16/2008         Fortino Rutherford MD  3/8/2022,8:22 AM

## 2022-04-25 DIAGNOSIS — I10 ESSENTIAL HYPERTENSION: Chronic | ICD-10-CM

## 2022-04-25 RX ORDER — NEBIVOLOL 5 MG/1
TABLET ORAL
Qty: 90 TABLET | Refills: 3 | Status: SHIPPED | OUTPATIENT
Start: 2022-04-25

## 2022-06-20 ENCOUNTER — OFFICE VISIT (OUTPATIENT)
Dept: CARDIOLOGY CLINIC | Facility: CLINIC | Age: 62
End: 2022-06-20
Payer: COMMERCIAL

## 2022-06-20 VITALS
RESPIRATION RATE: 16 BRPM | WEIGHT: 186 LBS | BODY MASS INDEX: 31.76 KG/M2 | OXYGEN SATURATION: 97 % | DIASTOLIC BLOOD PRESSURE: 80 MMHG | SYSTOLIC BLOOD PRESSURE: 130 MMHG | HEIGHT: 64 IN | HEART RATE: 72 BPM

## 2022-06-20 DIAGNOSIS — E78.2 MIXED HYPERLIPIDEMIA: ICD-10-CM

## 2022-06-20 DIAGNOSIS — I10 ESSENTIAL HYPERTENSION: ICD-10-CM

## 2022-06-20 DIAGNOSIS — I25.10 CORONARY ARTERIOSCLEROSIS IN NATIVE ARTERY: Primary | ICD-10-CM

## 2022-06-20 PROCEDURE — 3075F SYST BP GE 130 - 139MM HG: CPT | Performed by: INTERNAL MEDICINE

## 2022-06-20 PROCEDURE — 3079F DIAST BP 80-89 MM HG: CPT | Performed by: INTERNAL MEDICINE

## 2022-06-20 PROCEDURE — 3008F BODY MASS INDEX DOCD: CPT | Performed by: INTERNAL MEDICINE

## 2022-06-20 PROCEDURE — 1036F TOBACCO NON-USER: CPT | Performed by: INTERNAL MEDICINE

## 2022-06-20 PROCEDURE — 99214 OFFICE O/P EST MOD 30 MIN: CPT | Performed by: INTERNAL MEDICINE

## 2022-06-20 NOTE — PROGRESS NOTES
Cardiology Office Note    Jessica Rivera 62 y o  male MRN: [id number]    06/20/22          Assessment:  1  Nonobstructive CAD  2  Family history of premature CAD  3  Hypertension  4  Hyperlipidemia  5  Positional chest/mediastinal pain- resolved      Plan:  · Continue aspirin, Bystolic and Crestor  Ambulatory blood pressure monitoring and maintaining a low sodium diet was advised  · He was advised to notify us with the onset of cardiac symptoms  Follow up: 6 months or sooner as needed    1  Coronary arteriosclerosis in native artery     2  Essential hypertension     3  Mixed hyperlipidemia         HPI: Jessica Rivera is a 62y o  year old male with history of nonobstructive CAD, family history of premature CAD, hypertension, hyperlipidemia who presents for routine follow-up  Past cardiac evaluation:   · Cardiac catheterization 4/2018: nonobstructive CAD  · TTE 2/2018: EF: 60% with no significant valvular heart disease  He has been doing very well from a cardiac standpoint since his last evaluation  His previously noted chest discomfort has resolved  He was evaluated with a CTA that was unrevealing from a cardiovascular standpoint  He was noted to have a hepatic nodule  MRI revealed hemangioma  His blood pressure has been well controlled on ambulatory monitoring  He has been very active without limitation  He has been tolerating his medications without side effect  Family History: father passed from MI at 40; paternal uncle with CAD s/p MI at 77; brother with CAD s/p PCI at 52years old    Social history: denies tobacco and recreational drug use  Social alcohol use  Patient Active Problem List   Diagnosis    Coronary arteriosclerosis in native artery    Gastroesophageal reflux disease without esophagitis    Hyperlipidemia LDL goal <100    Essential hypertension    Obesity (BMI 30 0-34  9)    Liver nodule    Benign prostatic hyperplasia with weak urinary stream       Allergies Allergen Reactions    Dilaudid [Hydromorphone Hcl] Other (See Comments)     Pt states he had a bad experience, blood pressure dropped          Current Outpatient Medications:     aspirin 81 MG tablet, Take by mouth daily , Disp: , Rfl:     Coenzyme Q10 (CO Q 10) 10 MG CAPS, Take by mouth daily , Disp: , Rfl:     glucosamine-chondroitin 500-400 MG tablet, Take 1 tablet by mouth 2 (two) times a day  , Disp: , Rfl:     loratadine (CLARITIN) 10 mg tablet, Take 10 mg by mouth daily, Disp: , Rfl:     Misc Natural Products (TART CHERRY ADVANCED PO), Take by mouth, Disp: , Rfl:     multivitamin (THERAGRAN) TABS, Take 1 tablet by mouth daily, Disp: , Rfl:     nebivolol (BYSTOLIC) 5 mg tablet, TAKE 1 TABLET DAILY, Disp: 90 tablet, Rfl: 3    Omega-3 Fatty Acids ([employer]S FISH OIL) 1200 MG CAPS, CVS Fish Oil 1200 MG Oral Capsule; daily, Disp: , Rfl:     rosuvastatin (CRESTOR) 20 MG tablet, TAKE 1 TABLET DAILY (NEED APPOINTMENT FOR FURTHER REFILLS), Disp: 90 tablet, Rfl: 3    tamsulosin (FLOMAX) 0 4 mg, Take 1 capsule (0 4 mg total) by mouth daily with dinner, Disp: 90 capsule, Rfl: 3    TURMERIC PO, Take by mouth, Disp: , Rfl:     Past Medical History:   Diagnosis Date    CAD (coronary artery disease) 2009    50% RCA stenosis on coronary angiogram     GERD (gastroesophageal reflux disease)     Hyperlipidemia     Hypertension     Obesity     Schatzki's ring 02/07/2014       Family History   Problem Relation Age of Onset    Coronary artery disease Father     Heart attack Father 40    Arrhythmia Father     Cancer Maternal Aunt         GI       Past Surgical History:   Procedure Laterality Date    CARDIAC CATHETERIZATION  2009, 2018    COLONOSCOPY  10/13/2011    ESOPHAGOGASTRODUODENOSCOPY         Social History     Socioeconomic History    Marital status: /Civil Union     Spouse name: Not on file    Number of children: Not on file    Years of education: Not on file    Highest education level: Not on file   Occupational History    Not on file   Tobacco Use    Smoking status: Never Smoker    Smokeless tobacco: Never Used   Vaping Use    Vaping Use: Never used   Substance and Sexual Activity    Alcohol use: Yes     Comment: socially    Drug use: No    Sexual activity: Yes     Partners: Female   Other Topics Concern    Not on file   Social History Narrative    Not on file     Social Determinants of Health     Financial Resource Strain: Not on file   Food Insecurity: Not on file   Transportation Needs: Not on file   Physical Activity: Not on file   Stress: Not on file   Social Connections: Not on file   Intimate Partner Violence: Not on file   Housing Stability: Not on file       Review of Systems   Constitutional: Negative for diaphoresis, weight gain and weight loss  HENT: Negative for congestion  Cardiovascular: Negative for chest pain, dyspnea on exertion, irregular heartbeat, leg swelling, near-syncope, orthopnea, palpitations, paroxysmal nocturnal dyspnea and syncope  Respiratory: Negative for shortness of breath, sleep disturbances due to breathing and snoring  Hematologic/Lymphatic: Does not bruise/bleed easily  Skin: Negative for rash  Musculoskeletal: Negative for myalgias  Gastrointestinal: Negative for nausea and vomiting  Neurological: Negative for excessive daytime sleepiness and light-headedness  Psychiatric/Behavioral: The patient is not nervous/anxious  Vitals: /80 (BP Location: Left arm, Patient Position: Sitting)   Pulse 72   Resp 16   Ht 5' 4" (1 626 m)   Wt 84 4 kg (186 lb)   SpO2 97%   BMI 31 93 kg/m²       Physical Exam:     GEN: Alert and oriented x 3, in no acute distress  Well appearing and well nourished  HEENT: Sclera anicteric, conjunctivae pink, mucous membranes moist  Oropharynx clear  NECK: Supple, no carotid bruits, no significant JVD  Trachea midline, no thyromegaly     HEART: Regular rhythm, normal S1 and S2, no murmurs, clicks, gallops or rubs  PMI nondisplaced, no thrills  LUNGS: Clear to auscultation bilaterally; no wheezes, rales, or rhonchi  No increased work of breathing or signs of respiratory distress  ABDOMEN: Soft, nontender, nondistended, normoactive bowel sounds  EXTREMITIES: Skin warm and well perfused, no clubbing, cyanosis, or edema  NEURO: No focal findings  Normal speech  Mood and affect normal    SKIN: Normal without suspicious lesions on exposed skin

## 2022-08-15 ENCOUNTER — TELEPHONE (OUTPATIENT)
Dept: UROLOGY | Facility: AMBULATORY SURGERY CENTER | Age: 62
End: 2022-08-15

## 2022-08-15 DIAGNOSIS — R39.12 BENIGN PROSTATIC HYPERPLASIA WITH WEAK URINARY STREAM: Primary | ICD-10-CM

## 2022-08-15 DIAGNOSIS — N40.1 BENIGN PROSTATIC HYPERPLASIA WITH WEAK URINARY STREAM: Primary | ICD-10-CM

## 2022-08-15 NOTE — TELEPHONE ENCOUNTER
I called and spoke with patient  Informed him orders were placed for him to have PSA done PTV in October

## 2022-09-13 ENCOUNTER — RA CDI HCC (OUTPATIENT)
Dept: OTHER | Facility: HOSPITAL | Age: 62
End: 2022-09-13

## 2022-09-13 ENCOUNTER — APPOINTMENT (OUTPATIENT)
Dept: LAB | Facility: CLINIC | Age: 62
End: 2022-09-13
Payer: COMMERCIAL

## 2022-09-13 DIAGNOSIS — E78.5 HYPERLIPIDEMIA LDL GOAL <100: Chronic | ICD-10-CM

## 2022-09-13 DIAGNOSIS — R39.12 BENIGN PROSTATIC HYPERPLASIA WITH WEAK URINARY STREAM: ICD-10-CM

## 2022-09-13 DIAGNOSIS — N40.1 BENIGN PROSTATIC HYPERPLASIA WITH WEAK URINARY STREAM: ICD-10-CM

## 2022-09-13 LAB
ALBUMIN SERPL BCP-MCNC: 3.7 G/DL (ref 3.5–5)
ALP SERPL-CCNC: 45 U/L (ref 46–116)
ALT SERPL W P-5'-P-CCNC: 55 U/L (ref 12–78)
ANION GAP SERPL CALCULATED.3IONS-SCNC: 5 MMOL/L (ref 4–13)
AST SERPL W P-5'-P-CCNC: 24 U/L (ref 5–45)
BASOPHILS # BLD AUTO: 0.07 THOUSANDS/ΜL (ref 0–0.1)
BASOPHILS NFR BLD AUTO: 1 % (ref 0–1)
BILIRUB SERPL-MCNC: 1.1 MG/DL (ref 0.2–1)
BUN SERPL-MCNC: 12 MG/DL (ref 5–25)
CALCIUM SERPL-MCNC: 8.7 MG/DL (ref 8.3–10.1)
CHLORIDE SERPL-SCNC: 110 MMOL/L (ref 96–108)
CHOLEST SERPL-MCNC: 139 MG/DL
CO2 SERPL-SCNC: 24 MMOL/L (ref 21–32)
CREAT SERPL-MCNC: 0.82 MG/DL (ref 0.6–1.3)
EOSINOPHIL # BLD AUTO: 0.49 THOUSAND/ΜL (ref 0–0.61)
EOSINOPHIL NFR BLD AUTO: 7 % (ref 0–6)
ERYTHROCYTE [DISTWIDTH] IN BLOOD BY AUTOMATED COUNT: 13.5 % (ref 11.6–15.1)
GFR SERPL CREATININE-BSD FRML MDRD: 95 ML/MIN/1.73SQ M
GLUCOSE P FAST SERPL-MCNC: 89 MG/DL (ref 65–99)
HCT VFR BLD AUTO: 47.7 % (ref 36.5–49.3)
HDLC SERPL-MCNC: 62 MG/DL
HGB BLD-MCNC: 15.7 G/DL (ref 12–17)
IMM GRANULOCYTES # BLD AUTO: 0.01 THOUSAND/UL (ref 0–0.2)
IMM GRANULOCYTES NFR BLD AUTO: 0 % (ref 0–2)
LDLC SERPL CALC-MCNC: 55 MG/DL (ref 0–100)
LYMPHOCYTES # BLD AUTO: 1.76 THOUSANDS/ΜL (ref 0.6–4.47)
LYMPHOCYTES NFR BLD AUTO: 26 % (ref 14–44)
MCH RBC QN AUTO: 30.4 PG (ref 26.8–34.3)
MCHC RBC AUTO-ENTMCNC: 32.9 G/DL (ref 31.4–37.4)
MCV RBC AUTO: 92 FL (ref 82–98)
MONOCYTES # BLD AUTO: 0.55 THOUSAND/ΜL (ref 0.17–1.22)
MONOCYTES NFR BLD AUTO: 8 % (ref 4–12)
NEUTROPHILS # BLD AUTO: 3.92 THOUSANDS/ΜL (ref 1.85–7.62)
NEUTS SEG NFR BLD AUTO: 58 % (ref 43–75)
NONHDLC SERPL-MCNC: 77 MG/DL
NRBC BLD AUTO-RTO: 0 /100 WBCS
PLATELET # BLD AUTO: 258 THOUSANDS/UL (ref 149–390)
PMV BLD AUTO: 10.7 FL (ref 8.9–12.7)
POTASSIUM SERPL-SCNC: 3.9 MMOL/L (ref 3.5–5.3)
PROT SERPL-MCNC: 6.9 G/DL (ref 6.4–8.4)
PSA SERPL-MCNC: 0.7 NG/ML (ref 0–4)
RBC # BLD AUTO: 5.17 MILLION/UL (ref 3.88–5.62)
SODIUM SERPL-SCNC: 139 MMOL/L (ref 135–147)
TRIGL SERPL-MCNC: 108 MG/DL
TSH SERPL DL<=0.05 MIU/L-ACNC: 2.91 UIU/ML (ref 0.45–4.5)
WBC # BLD AUTO: 6.8 THOUSAND/UL (ref 4.31–10.16)

## 2022-09-13 PROCEDURE — 84443 ASSAY THYROID STIM HORMONE: CPT

## 2022-09-13 PROCEDURE — 36415 COLL VENOUS BLD VENIPUNCTURE: CPT

## 2022-09-13 PROCEDURE — 85025 COMPLETE CBC W/AUTO DIFF WBC: CPT

## 2022-09-13 PROCEDURE — 80053 COMPREHEN METABOLIC PANEL: CPT

## 2022-09-13 PROCEDURE — 80061 LIPID PANEL: CPT

## 2022-09-13 PROCEDURE — 84153 ASSAY OF PSA TOTAL: CPT

## 2022-09-20 ENCOUNTER — OFFICE VISIT (OUTPATIENT)
Dept: INTERNAL MEDICINE CLINIC | Facility: CLINIC | Age: 62
End: 2022-09-20
Payer: COMMERCIAL

## 2022-09-20 VITALS
WEIGHT: 188.6 LBS | TEMPERATURE: 97.3 F | RESPIRATION RATE: 18 BRPM | HEART RATE: 61 BPM | BODY MASS INDEX: 32.2 KG/M2 | DIASTOLIC BLOOD PRESSURE: 80 MMHG | OXYGEN SATURATION: 97 % | HEIGHT: 64 IN | SYSTOLIC BLOOD PRESSURE: 122 MMHG

## 2022-09-20 DIAGNOSIS — R13.19 OTHER DYSPHAGIA: ICD-10-CM

## 2022-09-20 DIAGNOSIS — E78.5 HYPERLIPIDEMIA LDL GOAL <100: Chronic | ICD-10-CM

## 2022-09-20 DIAGNOSIS — I10 ESSENTIAL HYPERTENSION: Primary | Chronic | ICD-10-CM

## 2022-09-20 DIAGNOSIS — I25.10 CORONARY ARTERIOSCLEROSIS IN NATIVE ARTERY: ICD-10-CM

## 2022-09-20 DIAGNOSIS — R39.12 BENIGN PROSTATIC HYPERPLASIA WITH WEAK URINARY STREAM: ICD-10-CM

## 2022-09-20 DIAGNOSIS — N40.1 BENIGN PROSTATIC HYPERPLASIA WITH WEAK URINARY STREAM: ICD-10-CM

## 2022-09-20 PROBLEM — K76.89 LIVER NODULE: Status: RESOLVED | Noted: 2022-02-07 | Resolved: 2022-09-20

## 2022-09-20 PROCEDURE — 3074F SYST BP LT 130 MM HG: CPT | Performed by: INTERNAL MEDICINE

## 2022-09-20 PROCEDURE — 3725F SCREEN DEPRESSION PERFORMED: CPT | Performed by: INTERNAL MEDICINE

## 2022-09-20 PROCEDURE — 3079F DIAST BP 80-89 MM HG: CPT | Performed by: INTERNAL MEDICINE

## 2022-09-20 PROCEDURE — 99214 OFFICE O/P EST MOD 30 MIN: CPT | Performed by: INTERNAL MEDICINE

## 2022-09-20 NOTE — PROGRESS NOTES
INTERNAL MEDICINE OFFICE VISIT  [facility] AT [facility]  [facility], [address]  Tel: [phone]      NAME: Cathy Pickett  AGE: 65 y o  SEX: male  : 1960   MRN: [id number]    DATE: 2022  TIME: 9:13 AM      Assessment and Plan:  1  Essential hypertension   continue medications    2  Hyperlipidemia LDL goal <100   continue Crestor  - CBC and differential; Future  - Comprehensive metabolic panel; Future  - Lipid panel; Future  - TSH, 3rd generation; Future    3  Benign prostatic hyperplasia with weak urinary stream   continue Flomax and follow-up with Urology    4  Coronary arteriosclerosis in native artery   follow-up with cardiology    5  Other dysphagia   he was advised to make an appointment with GI in order to decide about doing an EGD  - Ambulatory Referral to Gastroenterology; Future      - Counseling Documentation: patient was counseled regarding: diagnostic results, instructions for management, risk factor reductions, prognosis, patient and family education, risks and benefits of treatment options and importance of compliance with treatment  - Medication Side Effects: Adverse side effects of medications were reviewed with the patient/guardian today  Return for follow up visit in   Six months or earlier, if needed  Chief Complaint:  Chief Complaint   Patient presents with    Follow-up     6 months/ labs completed  History of Present Illness:    blood pressure, cholesterol are stable on medications   BPH is also stable and he follows up with Urology regularly   He has noticed that he has problems swallowing at times and recently had a few episodes in quick succession which is concerning  In the past he remembers getting an EGD done and there was procedure done to dilate his esophagus at that time        Active Problem List:  Patient Active Problem List   Diagnosis    Coronary arteriosclerosis in native artery    Gastroesophageal reflux disease without esophagitis    Hyperlipidemia LDL goal <100    Essential hypertension    Obesity (BMI 30 0-34  9)    Benign prostatic hyperplasia with weak urinary stream    Other dysphagia         Past Medical History:  Past Medical History:   Diagnosis Date    CAD (coronary artery disease) 2009    50% RCA stenosis on coronary angiogram     GERD (gastroesophageal reflux disease)     Hyperlipidemia     Hypertension     Liver nodule 2/7/2022    Obesity     Schatzki's ring 02/07/2014         Past Surgical History:  Past Surgical History:   Procedure Laterality Date    CARDIAC CATHETERIZATION  2009, 2018    COLONOSCOPY  10/13/2011    ESOPHAGOGASTRODUODENOSCOPY           Family History:  Family History   Problem Relation Age of Onset    Coronary artery disease Father     Heart attack Father 40    Arrhythmia Father     Cancer Maternal Aunt         GI         Social History:  Social History     Socioeconomic History    Marital status: /Civil Union     Spouse name: None    Number of children: None    Years of education: None    Highest education level: None   Occupational History    None   Tobacco Use    Smoking status: Never Smoker    Smokeless tobacco: Never Used   Vaping Use    Vaping Use: Never used   Substance and Sexual Activity    Alcohol use: Yes     Comment: socially    Drug use: No    Sexual activity: Yes     Partners: Female   Other Topics Concern    None   Social History Narrative    None     Social Determinants of Health     Financial Resource Strain: Not on file   Food Insecurity: Not on file   Transportation Needs: Not on file   Physical Activity: Not on file   Stress: No Stress Concern Present    Feeling of Stress : Not at all   Social Connections: Not on file   Intimate Partner Violence: Not on file   Housing Stability: Not on file         Allergies:   Allergies   Allergen Reactions    Dilaudid [Hydromorphone Hcl] Other (See Comments)     Pt states he had a bad experience, blood pressure dropped          Medications:    Current Outpatient Medications:     aspirin 81 MG tablet, Take by mouth daily , Disp: , Rfl:     Coenzyme Q10 (CO Q 10) 10 MG CAPS, Take by mouth daily , Disp: , Rfl:     glucosamine-chondroitin 500-400 MG tablet, Take 1 tablet by mouth in the morning, Disp: , Rfl:     loratadine (CLARITIN) 10 mg tablet, Take 10 mg by mouth daily, Disp: , Rfl:     Misc Natural Products (TART CHERRY ADVANCED PO), Take by mouth, Disp: , Rfl:     multivitamin (THERAGRAN) TABS, Take 1 tablet by mouth daily, Disp: , Rfl:     nebivolol (BYSTOLIC) 5 mg tablet, TAKE 1 TABLET DAILY, Disp: 90 tablet, Rfl: 3    Omega-3 Fatty Acids ([employer] FISH OIL) 1200 MG CAPS, CVS Fish Oil 1200 MG Oral Capsule; daily, Disp: , Rfl:     rosuvastatin (CRESTOR) 20 MG tablet, TAKE 1 TABLET DAILY (NEED APPOINTMENT FOR FURTHER REFILLS), Disp: 90 tablet, Rfl: 3    tamsulosin (FLOMAX) 0 4 mg, Take 1 capsule (0 4 mg total) by mouth daily with dinner, Disp: 90 capsule, Rfl: 3    TURMERIC PO, Take by mouth, Disp: , Rfl:       The following portions of the patient's history were reviewed and updated as appropriate: past medical history, past surgical history, family history, social history, allergies, current medications and active problem list       Review of Systems:  Constitutional: Denies fever, chills, weight gain, weight loss, fatigue  Eyes: Denies eye redness, eye discharge, double vision, change in visual acuity  ENT: Denies hearing loss, tinnitus, sneezing, nasal congestion, nasal discharge, sore throat   Respiratory: Denies cough, expectoration, hemoptysis, shortness of breath, wheezing  Cardiovascular: Denies chest pain, palpitations, lower extremity swelling, orthopnea, PND  Gastrointestinal: Denies abdominal pain, heartburn, nausea, vomiting, hematemesis, diarrhea, bloody stools     Has difficulty swallowing  Genito-Urinary: Denies dysuria, frequency, difficulty in micturition, nocturia, incontinence  Musculoskeletal: Denies back pain, joint pain, muscle pain  Neurologic: Denies confusion, lightheadedness, syncope, headache, focal weakness, sensory changes, seizures  Endocrine: Denies polyuria, polydipsia, temperature intolerance  Allergy and Immunology: Denies hives, insect bite sensitivity  Hematological and Lymphatic: Denies bleeding problems, swollen glands   Psychological: Denies depression, suicidal ideation, anxiety, panic, mood swings  Dermatological: Denies pruritus, rash, skin lesion changes      Vitals:  Vitals:    09/20/22 0853   BP: 122/80   Pulse: 61   Resp: 18   Temp: (!) 97 3 °F (36 3 °C)   SpO2: 97%       Body mass index is 32 37 kg/m²  Weight (last 2 days)     Date/Time Weight    09/20/22 0853 85 5 (188 6)     Weight: with shoe son at 09/20/22 4525            Physical Examination:  General: Patient is not in acute distress  Awake, alert, responding to commands  No weight gain or loss  Head: Normocephalic  Atraumatic  Eyes: Conjunctiva and lids with no swelling, erythema or discharge  Both pupils normal sized, round and reactive to light  Sclera nonicteric  ENT: External examination of nose and ear normal  Otoscopic examination shows translucent tympanic membranes with patent canals without erythema  Oropharynx moist with no erythema, edema, exudate or lesions  Neck: Supple  JVP not raised  Trachea midline  No masses  No thyromegaly  Lungs: No signs of increased work of breathing or respiratory distress  Bilateral bronchovascular breath sounds with no crackles or rhonchi  Chest wall: No tenderness  Cardiovascular: Normal PMI  No thrills  Regular rate and rhythm  S1 and S2 normal  No murmur, rub or gallop  Gastrointestinal: Abdomen soft, nontender  No guarding or rigidity  Liver and spleen not palpable  Bowel sounds present  Neurologic: Cranial nerves II-XII intact   Cortical functions normal  Motor system - Reflexes 2+ and symmetrical  Sensations normal  Musculoskeletal: Gait normal  No joint tenderness  Integumentary: Skin normal with no rash or lesions  Lymphatic: No palpable lymph nodes in neck, axilla or groin  Extremities: No clubbing, cyanosis, edema or varicosities  Psychological: Judgement and insight normal  Mood and affect normal      Laboratory Results:  CBC with diff:   Lab Results   Component Value Date    WBC 6 80 09/13/2022    WBC 8 4 08/07/2014    RBC 5 17 09/13/2022    RBC 4 97 08/07/2014    HGB 15 7 09/13/2022    HGB 14 5 08/07/2014    HCT 47 7 09/13/2022    HCT 43 8 08/07/2014    MCV 92 09/13/2022    MCV 88 08/07/2014    MCH 30 4 09/13/2022    MCH 29 3 08/07/2014    RDW 13 5 09/13/2022    RDW 13 2 08/07/2014     09/13/2022     08/07/2014       CMP:  Lab Results   Component Value Date    CREATININE 0 82 09/13/2022    CREATININE 0 8 08/07/2014    BUN 12 09/13/2022    BUN 12 08/07/2014     08/07/2014    K 3 9 09/13/2022    K 3 8 08/07/2014     (H) 09/13/2022     08/07/2014    CO2 24 09/13/2022    CO2 24 2 08/07/2014    GLUCOSE 78 08/07/2014    PROT 7 0 08/07/2014    ALKPHOS 45 (L) 09/13/2022    ALKPHOS 83 08/07/2014    ALT 55 09/13/2022    ALT 28 08/07/2014    AST 24 09/13/2022    AST 21 08/07/2014       No results found for: HGBA1C, MG, PHOS    No results found for: TROPONINI, CKMB, CKTOTAL    Lipid Profile:   Lab Results   Component Value Date    CHOL 138 08/07/2014     Lab Results   Component Value Date    HDL 62 09/13/2022    HDL 82 03/02/2022     Lab Results   Component Value Date    LDLCALC 55 09/13/2022    LDLCALC 55 03/02/2022     Lab Results   Component Value Date    TRIG 108 09/13/2022    TRIG 72 03/02/2022       Imaging Results:  MRI abdomen w wo contrast  Narrative: MRI - ABDOMEN - WITH AND WITHOUT CONTRAST    INDICATION: 62 years / Male  K65 80: Other specified diseases of liver  COMPARISON: Reference is made to CT chest dated 2/2/2022      TECHNIQUE:  The following pulse sequences were obtained prior to and following the administration of intravenous contrast:     Coronal and axial T2 with TE of 90 and 180 respectively, axial T2 with fat saturation, axial FIESTA fat-sat, axial T1-weighted   in-and-out-of phase, axial DWI/ADC, precontrast axial T1 with fat saturation, post-contrast dynamic axial T1 with fat saturation at 20, 70, and 180 seconds, coronal T1  with fat saturation and 7 minute delayed axial T1 with fat saturation  IV Contrast:  9 mL of Gadobutrol injection (SINGLE-DOSE)     FINDINGS:    LOWER CHEST:   Unremarkable  LIVER:   Within normal limits of size and configuration  No suspicious mass  There is a 1 1 x 1 0 cm T2 hyperintense lesion in segment 7 of the liver which demonstrates early nodular arterial phase enhancement with gradual fill-in on delayed phase imaging suggestive of hemangioma  No washout is demonstrated  This correlates with the finding reported on recent chest CT  Multiple scattered cysts are also demonstrated measuring up to 1 7 cm, most of which are simple in appearance with the exception of a thinly septated cyst in the left hepatic lobe  The hepatic veins and portal veins are patent  BILE DUCTS: No intrahepatic or extrahepatic bile duct dilation  GALLBLADDER:  Unremarkable  PANCREAS:  Unremarkable  ADRENAL GLANDS:  Unremarkable  SPLEEN:  Unremarkable  KIDNEYS/PROXIMAL URETERS: No hydroureteronephrosis  No suspicious renal mass  BOWEL:   No dilated loops of bowel  PERITONEUM/RETROPERITONEUM: No mass  No ascites  LYMPH NODES: No abdominal lymphadenopathy  VASCULAR STRUCTURES:  No aneurysm  ABDOMINAL WALL:  Unremarkable  OSSEOUS STRUCTURES:  No suspicious osseous lesion  Impression: No suspicious liver mass   1 1 x 1 0 cm segment 7 liver lesion with MR characteristics suggestive of hemangioma correlates with the finding reported on recent CT chest  Multiple scattered liver cysts measuring up to 1 7 cm, most of which are simple in   appearance with the exception of a thinly septated left hepatic lobe cyst     Workstation performed: [id number]       Health Maintenance:  Health Maintenance   Topic Date Due    DTaP,Tdap,and Td Vaccines (1 - Tdap) Never done    Annual Physical  06/13/2020    COVID-19 Vaccine (3 - Booster for Moderna series) 09/15/2021    Influenza Vaccine (1) 09/01/2022    BMI: Followup Plan  03/08/2023    BMI: Adult  06/20/2023    Depression Screening  09/20/2023    Colorectal Cancer Screening  10/25/2026    Hepatitis C Screening  Completed    Pneumococcal Vaccine: Pediatrics (0 to 5 Years) and At-Risk Patients (6 to 59 Years)  Aged Out    HIB Vaccine  Aged Out    Hepatitis B Vaccine  Aged Out    IPV Vaccine  Aged Out    Hepatitis A Vaccine  Aged Out    Meningococcal ACWY Vaccine  Aged Out    HPV Vaccine  Aged Out    HIV Screening  Discontinued     Immunization History   Administered Date(s) Administered    COVID-19 MODERNA VACC 0 5 ML IM 03/17/2021, 04/15/2021    INFLUENZA 12/16/2008, 09/17/2018    Influenza, injectable, quadrivalent, preservative free 0 5 mL 09/17/2018, 09/23/2019    Influenza, seasonal, injectable 10/01/2017    Pneumococcal Polysaccharide PPV23 12/16/2008         Mirtha Long MD  9/20/2022,9:13 AM

## 2022-10-13 ENCOUNTER — OFFICE VISIT (OUTPATIENT)
Dept: GASTROENTEROLOGY | Facility: CLINIC | Age: 62
End: 2022-10-13
Payer: COMMERCIAL

## 2022-10-13 VITALS
BODY MASS INDEX: 33.09 KG/M2 | DIASTOLIC BLOOD PRESSURE: 84 MMHG | RESPIRATION RATE: 18 BRPM | HEIGHT: 64 IN | HEART RATE: 68 BPM | SYSTOLIC BLOOD PRESSURE: 122 MMHG | OXYGEN SATURATION: 97 % | WEIGHT: 193.8 LBS

## 2022-10-13 DIAGNOSIS — K22.2 SCHATZKI'S RING: Primary | ICD-10-CM

## 2022-10-13 DIAGNOSIS — R13.10 DYSPHAGIA, UNSPECIFIED TYPE: ICD-10-CM

## 2022-10-13 PROCEDURE — 99214 OFFICE O/P EST MOD 30 MIN: CPT | Performed by: PHYSICIAN ASSISTANT

## 2022-10-13 NOTE — PROGRESS NOTES
[facility] - Outpatient Follow-up Note  Estuardo Gardenia 65 y o  male MRN: [id number]  Encounter: [id number]          ASSESSMENT AND PLAN:      1  Dysphagia  2  Schatzki's ring    Patient presents with intermittent dysphagia over the past year  He has a history of a Schatzki's ring that was dilated in 2014  Will plan for EGD to investigate with possible dilation  ______________________________________________________________________    SUBJECTIVE:  Patient is a pleasant 65year old male with a PMH of CAD, HTN, hyperlipidemia who presents to the office for an evaluation of dysphagia  Patient reports intermittent episodes of dysphagia for solids over the past year  He will feel the food stick in his chest for a brief period of time and he will be unable to get any liquids down until it passes  He denies abdominal pain  No blood in the stool  No unintentional weight loss  He reports occasional heartburn (previously years ago, he did used to have more frequent heartburn but made lifestyle changes with improvement)  He also reports that sometimes he will choke on his saliva  He has a history of a Schatzki's ring that was dilated in 2014  No first degree family history of esophageal, stomach, or colon cancer  He reports an aunt that had some sort of metastatic GI cancer but the primary site is not known  He had a colonoscopy in October of 2021 and 1 <5mm adenoma was removed  REVIEW OF SYSTEMS IS OTHERWISE NEGATIVE        Historical Information   Past Medical History:   Diagnosis Date   • CAD (coronary artery disease) 2009    50% RCA stenosis on coronary angiogram    • GERD (gastroesophageal reflux disease)    • Hyperlipidemia    • Hypertension    • Liver nodule 2/7/2022   • Obesity    • Schatzki's ring 02/07/2014     Past Surgical History:   Procedure Laterality Date   • CARDIAC CATHETERIZATION  2009, 2018   • COLONOSCOPY  10/13/2011   • ESOPHAGOGASTRODUODENOSCOPY Social History   Social History     Substance and Sexual Activity   Alcohol Use Yes    Comment: socially     Social History     Substance and Sexual Activity   Drug Use No     Social History     Tobacco Use   Smoking Status Never Smoker   Smokeless Tobacco Never Used     Family History   Problem Relation Age of Onset   • Coronary artery disease Father    • Heart attack Father 40   • Arrhythmia Father    • Cancer Maternal Aunt         GI       Meds/Allergies       Current Outpatient Medications:   •  aspirin 81 MG tablet  •  Coenzyme Q10 (CO Q 10) 10 MG CAPS  •  glucosamine-chondroitin 500-400 MG tablet  •  loratadine (CLARITIN) 10 mg tablet  •  Misc Natural Products (TART CHERRY ADVANCED PO)  •  multivitamin (THERAGRAN) TABS  •  nebivolol (BYSTOLIC) 5 mg tablet  •  Omega-3 Fatty Acids ([employer] FISH OIL) 1200 MG CAPS  •  rosuvastatin (CRESTOR) 20 MG tablet  •  tamsulosin (FLOMAX) 0 4 mg  •  TURMERIC PO    Allergies   Allergen Reactions   • Dilaudid [Hydromorphone Hcl] Other (See Comments)     Pt states he had a bad experience, blood pressure dropped            Objective     Blood pressure 122/84, pulse 68, resp  rate 18, height 5' 4" (1 626 m), weight 87 9 kg (193 lb 12 8 oz), SpO2 97 %  Body mass index is 33 27 kg/m²  PHYSICAL EXAM:      General Appearance:   Alert, cooperative, no distress   HEENT:   Normocephalic, atraumatic, anicteric      Neck:  Supple, symmetrical, trachea midline   Lungs:   Clear to auscultation bilaterally; no rales, rhonchi or wheezing; respirations unlabored    Heart[de-identified]   Regular rate and rhythm; no murmur, rub, or gallop     Abdomen:   Soft, non-tender, non-distended; normal bowel sounds; no masses, no organomegaly    Genitalia:   Deferred    Rectal:   Deferred    Extremities:  No cyanosis, clubbing or edema    Pulses:  2+ and symmetric    Skin:  No jaundice, rashes, or lesions    Lymph nodes:  No palpable cervical lymphadenopathy        Lab Results:   No visits with results within 1 Day(s) from this visit  Latest known visit with results is:   Appointment on 09/13/2022   Component Date Value   • WBC 09/13/2022 6 80    • RBC 09/13/2022 5 17    • Hemoglobin 09/13/2022 15 7    • Hematocrit 09/13/2022 47 7    • MCV 09/13/2022 92    • MCH 09/13/2022 30 4    • MCHC 09/13/2022 32 9    • RDW 09/13/2022 13 5    • MPV 09/13/2022 10 7    • Platelets 70/88/5548 258    • nRBC 09/13/2022 0    • Neutrophils Relative 09/13/2022 58    • Immat GRANS % 09/13/2022 0    • Lymphocytes Relative 09/13/2022 26    • Monocytes Relative 09/13/2022 8    • Eosinophils Relative 09/13/2022 7 (A)   • Basophils Relative 09/13/2022 1    • Neutrophils Absolute 09/13/2022 3 92    • Immature Grans Absolute 09/13/2022 0 01    • Lymphocytes Absolute 09/13/2022 1 76    • Monocytes Absolute 09/13/2022 0 55    • Eosinophils Absolute 09/13/2022 0 49    • Basophils Absolute 09/13/2022 0 07    • Sodium 09/13/2022 139    • Potassium 09/13/2022 3 9    • Chloride 09/13/2022 110 (A)   • CO2 09/13/2022 24    • ANION GAP 09/13/2022 5    • BUN 09/13/2022 12    • Creatinine 09/13/2022 0 82    • Glucose, Fasting 09/13/2022 89    • Calcium 09/13/2022 8 7    • AST 09/13/2022 24    • ALT 09/13/2022 55    • Alkaline Phosphatase 09/13/2022 45 (A)   • Total Protein 09/13/2022 6 9    • Albumin 09/13/2022 3 7    • Total Bilirubin 09/13/2022 1 10 (A)   • eGFR 09/13/2022 95    • Cholesterol 09/13/2022 139    • Triglycerides 09/13/2022 108    • HDL, Direct 09/13/2022 62    • LDL Calculated 09/13/2022 55    • Non-HDL-Chol (CHOL-HDL) 09/13/2022 77    • TSH 3RD GENERATON 09/13/2022 2 910    • PSA, Diagnostic 09/13/2022 0 7          Radiology Results:   No results found

## 2022-10-13 NOTE — PATIENT INSTRUCTIONS
Scheduled date of EGD(as of today): 12/8/22  Physician performing EGD: Saud Alvarado  Location of EGD: [place]  Instructions reviewed with patient by: Serafin NICHOLS  Clearances:

## 2022-10-21 NOTE — PROGRESS NOTES
10/24/2022      Chief Complaint   Patient presents with   • Follow-up         Assessment and Plan    65 y o  male     1  BPH with lower urinary tract symptoms   -overall happy with urination with Flomax and saw palmetto     2  Prostate cancer screening  -most recent PSA 0 7, stable   -AL today benign  -PSA in 1 year      History of Present Illness  Lilliam Mccormick is a 65 y o  male patient with history of BPH with lower urinary tract symptoms here for follow up  Patient has been taking Flomax and saw palmetto with maximal benefit  Denies any new or worsening urinary symptoms today  Most recent PSA 0 7, stable  Denies family history of  malignancies  Review of Systems   Constitutional: Negative for activity change, appetite change, chills and fever  HENT: Negative for congestion and trouble swallowing  Respiratory: Negative for cough and shortness of breath  Cardiovascular: Negative for chest pain, palpitations and leg swelling  Gastrointestinal: Negative for abdominal pain, constipation, diarrhea, nausea and vomiting  Genitourinary: Negative for difficulty urinating, dysuria, flank pain, frequency, hematuria and urgency  Musculoskeletal: Negative for back pain and gait problem  Skin: Negative for wound  Allergic/Immunologic: Negative for immunocompromised state  Neurological: Negative for dizziness and syncope  Hematological: Does not bruise/bleed easily  Psychiatric/Behavioral: Negative for confusion  All other systems reviewed and are negative  Vitals  Vitals:    10/24/22 0813   BP: 122/84   Pulse: 71   Weight: 87 1 kg (192 lb)   Height: 5' 4" (1 626 m)       Physical Exam  Constitutional:       General: He is not in acute distress  Appearance: Normal appearance  He is not ill-appearing, toxic-appearing or diaphoretic  HENT:      Head: Normocephalic  Nose: No congestion  Eyes:      General: No scleral icterus  Right eye: No discharge           Left eye: No discharge  Conjunctiva/sclera: Conjunctivae normal       Pupils: Pupils are equal, round, and reactive to light  Pulmonary:      Effort: Pulmonary effort is normal    Genitourinary:     Comments: Prostate smooth, symmetric, no palpable nodules  Musculoskeletal:      Cervical back: Normal range of motion  Skin:     General: Skin is warm and dry  Coloration: Skin is not jaundiced or pale  Findings: No bruising, erythema, lesion or rash  Neurological:      General: No focal deficit present  Mental Status: He is alert and oriented to person, place, and time  Mental status is at baseline  Gait: Gait normal    Psychiatric:         Mood and Affect: Mood normal          Behavior: Behavior normal          Thought Content:  Thought content normal          Judgment: Judgment normal            Past History  Past Medical History:   Diagnosis Date   • CAD (coronary artery disease) 2009    50% RCA stenosis on coronary angiogram    • GERD (gastroesophageal reflux disease)    • Hyperlipidemia    • Hypertension    • Liver nodule 2/7/2022   • Obesity    • Schatzki's ring 02/07/2014     Social History     Socioeconomic History   • Marital status: /Civil Union     Spouse name: None   • Number of children: None   • Years of education: None   • Highest education level: None   Occupational History   • None   Tobacco Use   • Smoking status: Never Smoker   • Smokeless tobacco: Never Used   Vaping Use   • Vaping Use: Never used   Substance and Sexual Activity   • Alcohol use: Yes     Comment: socially   • Drug use: No   • Sexual activity: Yes     Partners: Female   Other Topics Concern   • None   Social History Narrative   • None     Social Determinants of Health     Financial Resource Strain: Not on file   Food Insecurity: Not on file   Transportation Needs: Not on file   Physical Activity: Not on file   Stress: No Stress Concern Present   • Feeling of Stress : Not at all   Social Connections: Not on file   Intimate Partner Violence: Not on file   Housing Stability: Not on file     Social History     Tobacco Use   Smoking Status Never Smoker   Smokeless Tobacco Never Used     Family History   Problem Relation Age of Onset   • Coronary artery disease Father    • Heart attack Father 40   • Arrhythmia Father    • Cancer Maternal Aunt         GI       The following portions of the patient's history were reviewed and updated as appropriate: allergies, current medications, past medical history, past social history, past surgical history and problem list     Results  No results found for this or any previous visit (from the past 1 hour(s)) ]  Lab Results   Component Value Date    PSA 0 7 09/13/2022    PSA 0 7 03/02/2022    PSA 0 7 09/02/2021    PSA 0 5 06/13/2019     Lab Results   Component Value Date    GLUCOSE 78 08/07/2014    CALCIUM 8 7 09/13/2022     08/07/2014    K 3 9 09/13/2022    CO2 24 09/13/2022     (H) 09/13/2022    BUN 12 09/13/2022    CREATININE 0 82 09/13/2022     Lab Results   Component Value Date    WBC 6 80 09/13/2022    HGB 15 7 09/13/2022    HCT 47 7 09/13/2022    MCV 92 09/13/2022     09/13/2022       Merline Maze

## 2022-10-24 ENCOUNTER — OFFICE VISIT (OUTPATIENT)
Dept: UROLOGY | Facility: CLINIC | Age: 62
End: 2022-10-24
Payer: COMMERCIAL

## 2022-10-24 VITALS
BODY MASS INDEX: 32.78 KG/M2 | WEIGHT: 192 LBS | SYSTOLIC BLOOD PRESSURE: 122 MMHG | HEART RATE: 71 BPM | DIASTOLIC BLOOD PRESSURE: 84 MMHG | HEIGHT: 64 IN

## 2022-10-24 DIAGNOSIS — N40.1 BENIGN PROSTATIC HYPERPLASIA WITH WEAK URINARY STREAM: ICD-10-CM

## 2022-10-24 DIAGNOSIS — Z12.5 SCREENING FOR PROSTATE CANCER: Primary | ICD-10-CM

## 2022-10-24 DIAGNOSIS — R39.12 BENIGN PROSTATIC HYPERPLASIA WITH WEAK URINARY STREAM: ICD-10-CM

## 2022-10-24 PROCEDURE — 99213 OFFICE O/P EST LOW 20 MIN: CPT | Performed by: PHYSICIAN ASSISTANT

## 2022-10-24 RX ORDER — TAMSULOSIN HYDROCHLORIDE 0.4 MG/1
0.4 CAPSULE ORAL
Qty: 90 CAPSULE | Refills: 3 | Status: SHIPPED | OUTPATIENT
Start: 2022-10-24

## 2022-11-21 DIAGNOSIS — N40.1 BENIGN PROSTATIC HYPERPLASIA WITH WEAK URINARY STREAM: ICD-10-CM

## 2022-11-21 DIAGNOSIS — R39.12 BENIGN PROSTATIC HYPERPLASIA WITH WEAK URINARY STREAM: ICD-10-CM

## 2022-11-21 RX ORDER — TAMSULOSIN HYDROCHLORIDE 0.4 MG/1
CAPSULE ORAL
Qty: 90 CAPSULE | Refills: 3 | Status: SHIPPED | OUTPATIENT
Start: 2022-11-21

## 2022-12-07 RX ORDER — SODIUM CHLORIDE, SODIUM LACTATE, POTASSIUM CHLORIDE, CALCIUM CHLORIDE 600; 310; 30; 20 MG/100ML; MG/100ML; MG/100ML; MG/100ML
125 INJECTION, SOLUTION INTRAVENOUS CONTINUOUS
Status: CANCELLED | OUTPATIENT
Start: 2022-12-07

## 2022-12-08 ENCOUNTER — ANESTHESIA (OUTPATIENT)
Dept: GASTROENTEROLOGY | Facility: HOSPITAL | Age: 62
End: 2022-12-08

## 2022-12-08 ENCOUNTER — HOSPITAL ENCOUNTER (OUTPATIENT)
Dept: GASTROENTEROLOGY | Facility: HOSPITAL | Age: 62
Setting detail: OUTPATIENT SURGERY
End: 2022-12-08

## 2022-12-08 ENCOUNTER — ANESTHESIA EVENT (OUTPATIENT)
Dept: GASTROENTEROLOGY | Facility: HOSPITAL | Age: 62
End: 2022-12-08

## 2022-12-08 VITALS
BODY MASS INDEX: 31.36 KG/M2 | TEMPERATURE: 97.4 F | HEIGHT: 66 IN | SYSTOLIC BLOOD PRESSURE: 118 MMHG | DIASTOLIC BLOOD PRESSURE: 75 MMHG | WEIGHT: 195.11 LBS | RESPIRATION RATE: 16 BRPM | HEART RATE: 65 BPM | OXYGEN SATURATION: 96 %

## 2022-12-08 DIAGNOSIS — K22.2 ESOPHAGEAL STRICTURE: Primary | ICD-10-CM

## 2022-12-08 DIAGNOSIS — R13.10 DYSPHAGIA, UNSPECIFIED TYPE: ICD-10-CM

## 2022-12-08 RX ORDER — PANTOPRAZOLE SODIUM 40 MG/1
40 TABLET, DELAYED RELEASE ORAL
Qty: 60 TABLET | Refills: 5 | Status: SHIPPED | OUTPATIENT
Start: 2022-12-08 | End: 2022-12-12 | Stop reason: SDUPTHER

## 2022-12-08 RX ORDER — LIDOCAINE HYDROCHLORIDE 20 MG/ML
INJECTION, SOLUTION EPIDURAL; INFILTRATION; INTRACAUDAL; PERINEURAL AS NEEDED
Status: DISCONTINUED | OUTPATIENT
Start: 2022-12-08 | End: 2022-12-08

## 2022-12-08 RX ORDER — SODIUM CHLORIDE, SODIUM LACTATE, POTASSIUM CHLORIDE, CALCIUM CHLORIDE 600; 310; 30; 20 MG/100ML; MG/100ML; MG/100ML; MG/100ML
125 INJECTION, SOLUTION INTRAVENOUS CONTINUOUS
Status: DISCONTINUED | OUTPATIENT
Start: 2022-12-08 | End: 2022-12-12 | Stop reason: HOSPADM

## 2022-12-08 RX ORDER — PROPOFOL 10 MG/ML
INJECTION, EMULSION INTRAVENOUS AS NEEDED
Status: DISCONTINUED | OUTPATIENT
Start: 2022-12-08 | End: 2022-12-08

## 2022-12-08 RX ADMIN — SODIUM CHLORIDE, SODIUM LACTATE, POTASSIUM CHLORIDE, AND CALCIUM CHLORIDE: .6; .31; .03; .02 INJECTION, SOLUTION INTRAVENOUS at 09:45

## 2022-12-08 RX ADMIN — PROPOFOL 100 MG: 10 INJECTION, EMULSION INTRAVENOUS at 10:22

## 2022-12-08 RX ADMIN — LIDOCAINE HYDROCHLORIDE 100 MG: 20 INJECTION, SOLUTION EPIDURAL; INFILTRATION; INTRACAUDAL; PERINEURAL at 10:22

## 2022-12-08 RX ADMIN — PROPOFOL 30 MG: 10 INJECTION, EMULSION INTRAVENOUS at 10:25

## 2022-12-08 NOTE — INTERVAL H&P NOTE
H&P reviewed  After examining the patient I find no changes in the patients condition since the H&P had been written      Vitals:    12/08/22 0936   BP: 124/69   Pulse: 66   Resp: (!) 10   Temp: (!) 97 1 °F (36 2 °C)   SpO2: 96%

## 2022-12-08 NOTE — H&P
History and Physical -  [facility]  Travis Nayak 65 y o  male MRN: [id number]                  HPI: Travis Nayak is a 65y o  year old male who presents for EGD for dysphagia  History of Avi      REVIEW OF SYSTEMS: Per the HPI, and otherwise unremarkable      Historical Information   Past Medical History:   Diagnosis Date   • CAD (coronary artery disease) 2009    50% RCA stenosis on coronary angiogram    • GERD (gastroesophageal reflux disease)    • Hyperlipidemia    • Hypertension    • Liver nodule 2/7/2022   • Obesity    • Schatzki's ring 02/07/2014     Past Surgical History:   Procedure Laterality Date   • CARDIAC CATHETERIZATION  2009, 2018   • COLONOSCOPY  10/13/2011   • ESOPHAGOGASTRODUODENOSCOPY       Social History   Social History     Substance and Sexual Activity   Alcohol Use Yes    Comment: socially     Social History     Substance and Sexual Activity   Drug Use No     Social History     Tobacco Use   Smoking Status Never   Smokeless Tobacco Never     Family History   Problem Relation Age of Onset   • Coronary artery disease Father    • Heart attack Father 40   • Arrhythmia Father    • Cancer Maternal Aunt         GI       Meds/Allergies     (Not in a hospital admission)      Allergies   Allergen Reactions   • Dilaudid [Hydromorphone Hcl] Other (See Comments)     Pt states he had a bad experience, blood pressure dropped        Objective     Temperature (!) 97 1 °F (36 2 °C), temperature source Temporal       PHYSICAL EXAM    Gen: NAD  CV: RRR  CHEST: Clear  ABD: soft, NT/ND  EXT: no edema  Neuro: AAO      ASSESSMENT/PLAN:  This is a 65y o  year old male here for dysphagia    PLAN:   Procedure: EGD

## 2022-12-08 NOTE — ANESTHESIA POSTPROCEDURE EVALUATION
Post-Op Assessment Note    CV Status:  Stable  Pain Score: 0    Pain management: adequate     Mental Status:  Alert and awake   Hydration Status:  Euvolemic   PONV Controlled:  Controlled   Airway Patency:  Patent      Post Op Vitals Reviewed: Yes      Staff: Anesthesiologist, CRNA         No notable events documented      BP   120/71   Temp  98 6   Pulse  76   Resp  16   SpO2   95%

## 2022-12-08 NOTE — ANESTHESIA PREPROCEDURE EVALUATION
Procedure:  EGD    Relevant Problems   CARDIO   (+) Coronary arteriosclerosis in native artery   (+) Essential hypertension   (+) Hyperlipidemia LDL goal <100      GI/HEPATIC   (+) Gastroesophageal reflux disease without esophagitis   (+) Other dysphagia      History Comments History Comments   Hypertension  Schatzki's ring    Hyperlipidemia  CAD (coronary artery disease) 50% RCA stenosis on coronary angiogram    GERD (gastroesophageal reflux disease)  Obesity    Liver nodule        Surgical History     Current as of 12/08/22 1002  COLONOSCOPY ESOPHAGOGASTRODUODENOSCOPY   CARDIAC CATHETERIZATION      Substance History     Current as of 12/08/22 1002  Smoking Status: Never   Smokeless Tobacco Status: Never   Alcohol use: Yes, unspecified volume   Drug use: No     Problem List     Current as of 12/08/22 1002  Coronary arteriosclerosis in native artery   Gastroesophageal reflux disease without esophagitis   Hyperlipidemia LDL goal <100   Essential hypertension   Obesity (BMI 30 0-34  9)   Benign prostatic hyperplasia with weak urinary stream   Other dysphagia       Physical Exam    Airway    Mallampati score: III  TM Distance: >3 FB  Neck ROM: full     Dental       Cardiovascular  Cardiovascular exam normal    Pulmonary  Pulmonary exam normal     Other Findings        Anesthesia Plan  ASA Score- 3     Anesthesia Type- IV sedation with anesthesia with ASA Monitors  Additional Monitors:   Airway Plan:           Plan Factors-    Chart reviewed  EKG reviewed  Existing labs reviewed  Patient summary reviewed  Patient is not a current smoker  Induction- intravenous  Postoperative Plan-     Informed Consent- Anesthetic plan and risks discussed with patient  I personally reviewed this patient with the CRNA  Discussed and agreed on the Anesthesia Plan with the CRNA            1  Dysphagia  2  Schatzki's ring     Patient presents with intermittent dysphagia over the past year    He has a history of a Schatzki's ring that was dilated in 2014      Will plan for EGD to investigate with possible dilation       ______________________________________________________________________     SUBJECTIVE:  Patient is a pleasant 65year old male with a PMH of CAD, HTN, hyperlipidemia who presents to the office for an evaluation of dysphagia  Patient reports intermittent episodes of dysphagia for solids over the past year  He will feel the food stick in his chest for a brief period of time and he will be unable to get any liquids down until it passes  He denies abdominal pain  No blood in the stool  No unintentional weight loss  He reports occasional heartburn (previously years ago, he did used to have more frequent heartburn but made lifestyle changes with improvement)  He also reports that sometimes he will choke on his saliva  He has a history of a Schatzki's ring that was dilated in 2014  No first degree family history of esophageal, stomach, or colon cancer  He reports an aunt that had some sort of metastatic GI cancer but the primary site is not known    He had a colonoscopy in October of 2021 and 1 <5mm adenoma was removed

## 2022-12-12 DIAGNOSIS — K22.2 ESOPHAGEAL STRICTURE: ICD-10-CM

## 2022-12-12 RX ORDER — PANTOPRAZOLE SODIUM 40 MG/1
40 TABLET, DELAYED RELEASE ORAL
Qty: 90 TABLET | Refills: 3 | Status: SHIPPED | OUTPATIENT
Start: 2022-12-12

## 2023-01-18 ENCOUNTER — TELEPHONE (OUTPATIENT)
Dept: GASTROENTEROLOGY | Facility: CLINIC | Age: 63
End: 2023-01-18

## 2023-01-18 NOTE — TELEPHONE ENCOUNTER
Please tell the patient the biopsy was benign and to call me in 2 weeks with a progress report, to keep taking the medication I prescribed him

## 2023-01-18 NOTE — TELEPHONE ENCOUNTER
Patients GI provider:  Dr Sheryl Cardenas    Number to return call: [phone]    Reason for call: Pt calling for biopsy results      Scheduled procedure/appointment date if applicable: no apts/proc

## 2023-02-01 ENCOUNTER — TELEPHONE (OUTPATIENT)
Dept: OTHER | Facility: OTHER | Age: 63
End: 2023-02-01

## 2023-02-16 ENCOUNTER — OFFICE VISIT (OUTPATIENT)
Dept: DERMATOLOGY | Facility: CLINIC | Age: 63
End: 2023-02-16

## 2023-02-16 VITALS — WEIGHT: 190 LBS | HEIGHT: 66 IN | BODY MASS INDEX: 30.53 KG/M2

## 2023-02-16 DIAGNOSIS — L82.1 SEBORRHEIC KERATOSIS: Primary | ICD-10-CM

## 2023-02-16 DIAGNOSIS — Z13.89 SCREENING FOR SKIN CONDITION: ICD-10-CM

## 2023-02-16 NOTE — PROGRESS NOTES
[facility] Note     Patient Name: Zach Goodwin  Encounter Date: February 16, 2023     Have you been cared for by a [place] [address] Dermatologist in the last 3 years and, if so, which description applies to you? Yes  I have been here within the last 3 years, and my medical history has NOT changed since that time  I am MALE/not capable of bearing children  REVIEW OF SYSTEMS:  Have you recently had or currently have any of the following? · No changes in my recent health  PAST MEDICAL HISTORY:  Have you personally ever had or currently have any of the following? If "YES," then please provide more detail  · No changes in my medical history  FAMILY HISTORY:  Any "first degree relatives" (parent, brother, sister, or child) with the following? • No changes in my family's known health  PATIENT EXPERIENCE:    • Do you want the Dermatologist to perform a COMPLETE skin exam today including a clinical examination under the "bra and underwear" areas? Yes  • If necessary, do we have your permission to call and leave a detailed message on your Preferred Phone number that includes your specific medical information?   Yes      Allergies   Allergen Reactions   • Dilaudid [Hydromorphone Hcl] Other (See Comments)     Pt states he had a bad experience, blood pressure dropped       Current Outpatient Medications:   •  aspirin 81 MG tablet, Take by mouth daily , Disp: , Rfl:   •  Coenzyme Q10 (CO Q 10) 10 MG CAPS, Take by mouth daily , Disp: , Rfl:   •  glucosamine-chondroitin 500-400 MG tablet, Take 1 tablet by mouth in the morning, Disp: , Rfl:   •  loratadine (CLARITIN) 10 mg tablet, Take 10 mg by mouth daily, Disp: , Rfl:   •  Misc Natural Products (TART CHERRY ADVANCED PO), Take by mouth, Disp: , Rfl:   •  multivitamin (THERAGRAN) TABS, Take 1 tablet by mouth daily, Disp: , Rfl:   •  nebivolol (BYSTOLIC) 5 mg tablet, TAKE 1 TABLET DAILY, Disp: 90 tablet, Rfl: 3  •  Omega-3 Fatty Acids ([employer] FISH OIL) 1200 MG CAPS, [employer]S Fish Oil 1200 MG Oral Capsule; daily, Disp: , Rfl:   •  pantoprazole (PROTONIX) 40 mg tablet, Take 1 tablet (40 mg total) by mouth daily before breakfast, Disp: 90 tablet, Rfl: 3  •  rosuvastatin (CRESTOR) 20 MG tablet, TAKE 1 TABLET DAILY (NEED APPOINTMENT FOR FURTHER REFILLS), Disp: 90 tablet, Rfl: 3  •  tamsulosin (FLOMAX) 0 4 mg, TAKE 1 CAPSULE DAILY WITH DINNER, Disp: 90 capsule, Rfl: 3  •  TURMERIC PO, Take by mouth, Disp: , Rfl:           • Whom besides the patient is providing clinical information about today's encounter?   o NO ADDITIONAL HISTORIAN (patient alone provided history)    Physical Exam and Assessment/Plan by Diagnosis:

## 2023-02-16 NOTE — PROGRESS NOTES
[facility] DERMATOLOGY  [address] [place] [address]  [place] [address]  [phone]  [phone]     MRN: [id number] : 1960  Encounter: [id number]  Patient Information: Reji Paez  Chief complaint: yearly check up    History of present illness 50-year-old male presents for overall skin check concern regarding potential skin cancer  Past Medical History:   Diagnosis Date   • CAD (coronary artery disease)     50% RCA stenosis on coronary angiogram    • GERD (gastroesophageal reflux disease)    • Hyperlipidemia    • Hypertension    • Liver nodule 2022   • Obesity    • Schatzki's ring 2014     Past Surgical History:   Procedure Laterality Date   • CARDIAC CATHETERIZATION  2018   • COLONOSCOPY  10/13/2011   • ESOPHAGOGASTRODUODENOSCOPY       Social History   Social History     Substance and Sexual Activity   Alcohol Use Yes    Comment: socially     Social History     Substance and Sexual Activity   Drug Use No     Social History     Tobacco Use   Smoking Status Never   Smokeless Tobacco Never     Family History   Problem Relation Age of Onset   • Coronary artery disease Father    • Heart attack Father 40   • Arrhythmia Father    • Cancer Maternal Aunt         GI     Meds/Allergies   Allergies   Allergen Reactions   • Dilaudid [Hydromorphone Hcl] Other (See Comments)     Pt states he had a bad experience, blood pressure dropped        Meds:  Prior to Admission medications    Medication Sig Start Date End Date Taking?  Authorizing Provider   aspirin 81 MG tablet Take by mouth daily    Yes Historical Provider, MD   Coenzyme Q10 (CO Q 10) 10 MG CAPS Take by mouth daily    Yes Historical Provider, MD   glucosamine-chondroitin 500-400 MG tablet Take 1 tablet by mouth in the morning   Yes Historical Provider, MD   loratadine (CLARITIN) 10 mg tablet Take 10 mg by mouth daily   Yes Historical Provider, MD   Misc Natural Products (TART CHERRY ADVANCED PO) Take by mouth   Yes Historical Provider, MD   multivitamin (THERAGRAN) TABS Take 1 tablet by mouth daily   Yes Historical Provider, MD   nebivolol (BYSTOLIC) 5 mg tablet TAKE 1 TABLET DAILY 4/25/22  Yes Annalise Llamas MD   Omega-3 Fatty Acids ([employer]S FISH OIL) 1200 MG CAPS CVS Fish Oil 1200 MG Oral Capsule; daily   Yes Historical Provider, MD   pantoprazole (PROTONIX) 40 mg tablet Take 1 tablet (40 mg total) by mouth daily before breakfast 12/12/22  Yes Broderick Pruitt MD   rosuvastatin (CRESTOR) 20 MG tablet TAKE 1 TABLET DAILY (NEED APPOINTMENT FOR FURTHER REFILLS) 3/2/22  Yes Eric Mooney MD   tamsulosin (FLOMAX) 0 4 mg TAKE 1 CAPSULE DAILY WITH DINNER 11/21/22  Yes Sebastian Kerr PA-C   TURMERIC PO Take by mouth   Yes Historical Provider, MD       Subjective:     Review of Systems:    General: negative for - chills, fatigue, fever,  weight gain or weight loss  Psychological: negative for - anxiety, behavioral disorder, concentration difficulties, decreased libido, depression, irritability, memory difficulties, mood swings, sleep disturbances or suicidal ideation  ENT: negative for - hearing difficulties , nasal congestion, nasal discharge, oral lesions, sinus pain, sneezing, sore throat  Allergy and Immunology: negative for - hives, insect bite sensitivity,  Hematological and Lymphatic: negative for - bleeding problems, blood clots,bruising, swollen lymph nodes  Endocrine: negative for - hair pattern changes, hot flashes, malaise/lethargy, mood swings, palpitations, polydipsia/polyuria, skin changes, temperature intolerance or unexpected weight change  Respiratory: negative for - cough, hemoptysis, orthopnea, shortness of breath, or wheezing  Cardiovascular: negative for - chest pain, dyspnea on exertion, edema,  Gastrointestinal: negative for - abdominal pain, nausea/vomiting  Genito-Urinary: negative for - dysuria, incontinence, irregular/heavy menses or urinary frequency/urgency  Musculoskeletal: negative for - gait disturbance, joint pain, joint stiffness, joint swelling, muscle pain, muscular weakness  Dermatological:  As in HPI  Neurological: negative for confusion, dizziness, headaches, impaired coordination/balance, memory loss, numbness/tingling, seizures, speech problems, tremors or weakness       Objective:   Ht 5' 6" (1 676 m)   Wt 86 2 kg (190 lb)   BMI 30 67 kg/m²     Physical Exam:    General Appearance:    Alert, cooperative, no distress   Head:    Normocephalic, without obvious abnormality, atraumatic           Skin:   A full skin exam was performed including scalp, head scalp, eyes, ears, nose, lips, neck, chest, axilla, abdomen, back, buttocks, bilateral upper extremities, bilateral lower extremities, hands, feet, fingers, toes, fingernails, and toenails keratotic papules with greasy stuck on appearance nothing else remarkable noted on complete exam     Assessment:     1  Seborrheic keratosis        2  Screening for skin condition              Plan:   Seborrheic Keratosis  Patient reasurred these are normal growths we acquire with age no treatment needed  Screening for Dermatologic Disorders: Nothing else of concern noted on complete exam follow up in 1 year       Miguel Ángel Choi MD  2/16/2023,8:03 AM    Portions of the record may have been created with voice recognition software   Occasional wrong word or "sound a like" substitutions may have occurred due to the inherent limitations of voice recognition software   Read the chart carefully and recognize, using context, where substitutions have occurred

## 2023-02-27 DIAGNOSIS — E78.5 HYPERLIPIDEMIA, UNSPECIFIED HYPERLIPIDEMIA TYPE: ICD-10-CM

## 2023-02-27 RX ORDER — ROSUVASTATIN CALCIUM 20 MG/1
TABLET, COATED ORAL
Qty: 90 TABLET | Refills: 3 | Status: SHIPPED | OUTPATIENT
Start: 2023-02-27

## 2023-03-09 ENCOUNTER — OFFICE VISIT (OUTPATIENT)
Dept: CARDIOLOGY CLINIC | Facility: CLINIC | Age: 63
End: 2023-03-09

## 2023-03-09 VITALS
HEART RATE: 63 BPM | OXYGEN SATURATION: 97 % | RESPIRATION RATE: 16 BRPM | DIASTOLIC BLOOD PRESSURE: 82 MMHG | HEIGHT: 66 IN | WEIGHT: 189 LBS | BODY MASS INDEX: 30.37 KG/M2 | SYSTOLIC BLOOD PRESSURE: 130 MMHG

## 2023-03-09 DIAGNOSIS — I25.10 CORONARY ARTERIOSCLEROSIS IN NATIVE ARTERY: ICD-10-CM

## 2023-03-09 DIAGNOSIS — I10 ESSENTIAL HYPERTENSION: Primary | ICD-10-CM

## 2023-03-09 NOTE — PROGRESS NOTES
Cardiology Office Note    Zach Goodwin 65 y o  male MRN: [id number]    03/09/23          Assessment:  1  Nonobstructive CAD  2  Family history of premature CAD  3  Hypertension  4  Hyperlipidemia        Plan:  · Continue aspirin and Crestor  · BP at goal   Continue Bystolic  Ambulatory blood pressure monitoring and maintaining a low sodium diet was advised  · He was advised to notify us with the onset of cardiac symptoms  Follow up: 1 year or sooner as needed    1  Essential hypertension        2  Coronary arteriosclerosis in native artery            HPI: Zach Goodwin is a 65y o  year old male with history of nonobstructive CAD, family history of premature CAD, hypertension, hyperlipidemia who presents for routine follow-up  Past cardiac evaluation:   · Cardiac catheterization 4/2018: nonobstructive CAD  · TTE 2/2018: EF: 60% with no significant valvular heart disease  He has been doing very well from a cardiac standpoint since his last evaluation  He has been active without limitation  His blood pressure has been at goal   He is tolerating his medications without side effect  He denies chest pain, palpitations, dyspnea exertion or any other cardiac concerns at this time  Family History: father passed from MI at 40; paternal uncle with CAD s/p MI at 77; brother with CAD s/p PCI at 52years old    Social history: denies tobacco and recreational drug use  Social alcohol use  Patient Active Problem List   Diagnosis   • Coronary arteriosclerosis in native artery   • Gastroesophageal reflux disease without esophagitis   • Hyperlipidemia LDL goal <100   • Essential hypertension   • Obesity (BMI 30 0-34  9)   • Benign prostatic hyperplasia with weak urinary stream   • Other dysphagia       Allergies   Allergen Reactions   • Dilaudid [Hydromorphone Hcl] Other (See Comments)     Pt states he had a bad experience, blood pressure dropped          Current Outpatient Medications:   •  aspirin 81 MG tablet, Take by mouth daily , Disp: , Rfl:   •  Coenzyme Q10 (CO Q 10) 10 MG CAPS, Take by mouth daily , Disp: , Rfl:   •  glucosamine-chondroitin 500-400 MG tablet, Take 1 tablet by mouth in the morning, Disp: , Rfl:   •  loratadine (CLARITIN) 10 mg tablet, Take 10 mg by mouth daily, Disp: , Rfl:   •  Misc Natural Products (TART CHERRY ADVANCED PO), Take by mouth, Disp: , Rfl:   •  multivitamin (THERAGRAN) TABS, Take 1 tablet by mouth daily, Disp: , Rfl:   •  nebivolol (BYSTOLIC) 5 mg tablet, TAKE 1 TABLET DAILY, Disp: 90 tablet, Rfl: 3  •  Omega-3 Fatty Acids ([employer]S FISH OIL) 1200 MG CAPS, CVS Fish Oil 1200 MG Oral Capsule; daily, Disp: , Rfl:   •  pantoprazole (PROTONIX) 40 mg tablet, Take 1 tablet (40 mg total) by mouth daily before breakfast, Disp: 90 tablet, Rfl: 3  •  rosuvastatin (CRESTOR) 20 MG tablet, TAKE 1 TABLET DAILY (NEED APPOINTMENT FOR FURTHER REFILLS), Disp: 90 tablet, Rfl: 3  •  tamsulosin (FLOMAX) 0 4 mg, TAKE 1 CAPSULE DAILY WITH DINNER, Disp: 90 capsule, Rfl: 3  •  TURMERIC PO, Take by mouth, Disp: , Rfl:     Past Medical History:   Diagnosis Date   • CAD (coronary artery disease) 2009    50% RCA stenosis on coronary angiogram    • GERD (gastroesophageal reflux disease)    • Hyperlipidemia    • Hypertension    • Liver nodule 2/7/2022   • Obesity    • Schatzki's ring 02/07/2014       Family History   Problem Relation Age of Onset   • Coronary artery disease Father    • Heart attack Father 40   • Arrhythmia Father    • Cancer Maternal Aunt         GI       Past Surgical History:   Procedure Laterality Date   • CARDIAC CATHETERIZATION  2009, 2018   • COLONOSCOPY  10/13/2011   • ESOPHAGOGASTRODUODENOSCOPY         Social History     Socioeconomic History   • Marital status: /Civil Union     Spouse name: Not on file   • Number of children: Not on file   • Years of education: Not on file   • Highest education level: Not on file   Occupational History   • Not on file   Tobacco Use   • Smoking status: Never   • Smokeless tobacco: Never   Vaping Use   • Vaping Use: Never used   Substance and Sexual Activity   • Alcohol use: Yes     Comment: socially   • Drug use: No   • Sexual activity: Yes     Partners: Female   Other Topics Concern   • Not on file   Social History Narrative   • Not on file     Social Determinants of Health     Financial Resource Strain: Not on file   Food Insecurity: Not on file   Transportation Needs: Not on file   Physical Activity: Not on file   Stress: No Stress Concern Present   • Feeling of Stress : Not at all   Social Connections: Not on file   Intimate Partner Violence: Not on file   Housing Stability: Not on file       Review of Systems   Constitutional: Negative for diaphoresis, weight gain and weight loss  HENT: Negative for congestion  Cardiovascular: Negative for chest pain, dyspnea on exertion, irregular heartbeat, leg swelling, near-syncope, orthopnea, palpitations, paroxysmal nocturnal dyspnea and syncope  Respiratory: Negative for shortness of breath, sleep disturbances due to breathing and snoring  Hematologic/Lymphatic: Does not bruise/bleed easily  Skin: Negative for rash  Musculoskeletal: Negative for myalgias  Gastrointestinal: Negative for nausea and vomiting  Neurological: Negative for excessive daytime sleepiness and light-headedness  Psychiatric/Behavioral: The patient is not nervous/anxious  Vitals: /82 (BP Location: Left arm, Patient Position: Sitting, Cuff Size: Standard)   Pulse 63   Resp 16   Ht 5' 6" (1 676 m)   Wt 85 7 kg (189 lb)   SpO2 97%   BMI 30 51 kg/m²       Physical Exam:     GEN: Alert and oriented x 3, in no acute distress  Well appearing and well nourished  HEENT: Sclera anicteric, conjunctivae pink, mucous membranes moist  Oropharynx clear  NECK: Supple, no carotid bruits, no significant JVD  Trachea midline, no thyromegaly     HEART: Regular rhythm, normal S1 and S2, no murmurs, clicks, gallops or rubs  PMI nondisplaced, no thrills  LUNGS: Clear to auscultation bilaterally; no wheezes, rales, or rhonchi  No increased work of breathing or signs of respiratory distress  ABDOMEN: Soft, nontender, nondistended, normoactive bowel sounds  EXTREMITIES: Skin warm and well perfused, no clubbing, cyanosis, or edema  NEURO: No focal findings  Normal speech  Mood and affect normal    SKIN: Normal without suspicious lesions on exposed skin

## 2023-03-21 ENCOUNTER — APPOINTMENT (OUTPATIENT)
Dept: LAB | Facility: CLINIC | Age: 63
End: 2023-03-21

## 2023-03-21 ENCOUNTER — OFFICE VISIT (OUTPATIENT)
Dept: INTERNAL MEDICINE CLINIC | Facility: CLINIC | Age: 63
End: 2023-03-21

## 2023-03-21 VITALS
RESPIRATION RATE: 16 BRPM | WEIGHT: 189.6 LBS | TEMPERATURE: 98.2 F | SYSTOLIC BLOOD PRESSURE: 126 MMHG | BODY MASS INDEX: 30.47 KG/M2 | DIASTOLIC BLOOD PRESSURE: 84 MMHG | HEART RATE: 63 BPM | OXYGEN SATURATION: 97 % | HEIGHT: 66 IN

## 2023-03-21 DIAGNOSIS — R09.82 PND (POST-NASAL DRIP): ICD-10-CM

## 2023-03-21 DIAGNOSIS — I25.10 CORONARY ARTERIOSCLEROSIS IN NATIVE ARTERY: ICD-10-CM

## 2023-03-21 DIAGNOSIS — K21.9 GASTROESOPHAGEAL REFLUX DISEASE WITHOUT ESOPHAGITIS: Chronic | ICD-10-CM

## 2023-03-21 DIAGNOSIS — E66.9 OBESITY (BMI 30.0-34.9): Chronic | ICD-10-CM

## 2023-03-21 DIAGNOSIS — I10 ESSENTIAL HYPERTENSION: Primary | Chronic | ICD-10-CM

## 2023-03-21 DIAGNOSIS — E78.5 HYPERLIPIDEMIA LDL GOAL <100: Chronic | ICD-10-CM

## 2023-03-21 LAB
ALBUMIN SERPL BCP-MCNC: 4.2 G/DL (ref 3.5–5)
ALP SERPL-CCNC: 42 U/L (ref 46–116)
ALT SERPL W P-5'-P-CCNC: 29 U/L (ref 12–78)
ANION GAP SERPL CALCULATED.3IONS-SCNC: 3 MMOL/L (ref 4–13)
AST SERPL W P-5'-P-CCNC: 20 U/L (ref 5–45)
BASOPHILS # BLD AUTO: 0.05 THOUSANDS/ÂΜL (ref 0–0.1)
BASOPHILS NFR BLD AUTO: 1 % (ref 0–1)
BILIRUB SERPL-MCNC: 1.12 MG/DL (ref 0.2–1)
BUN SERPL-MCNC: 17 MG/DL (ref 5–25)
CALCIUM SERPL-MCNC: 8.9 MG/DL (ref 8.3–10.1)
CHLORIDE SERPL-SCNC: 111 MMOL/L (ref 96–108)
CHOLEST SERPL-MCNC: 152 MG/DL
CO2 SERPL-SCNC: 26 MMOL/L (ref 21–32)
CREAT SERPL-MCNC: 0.85 MG/DL (ref 0.6–1.3)
EOSINOPHIL # BLD AUTO: 0.21 THOUSAND/ÂΜL (ref 0–0.61)
EOSINOPHIL NFR BLD AUTO: 4 % (ref 0–6)
ERYTHROCYTE [DISTWIDTH] IN BLOOD BY AUTOMATED COUNT: 13 % (ref 11.6–15.1)
GFR SERPL CREATININE-BSD FRML MDRD: 94 ML/MIN/1.73SQ M
GLUCOSE P FAST SERPL-MCNC: 93 MG/DL (ref 65–99)
HCT VFR BLD AUTO: 49.2 % (ref 36.5–49.3)
HDLC SERPL-MCNC: 56 MG/DL
HGB BLD-MCNC: 16.5 G/DL (ref 12–17)
IMM GRANULOCYTES # BLD AUTO: 0.01 THOUSAND/UL (ref 0–0.2)
IMM GRANULOCYTES NFR BLD AUTO: 0 % (ref 0–2)
LDLC SERPL CALC-MCNC: 81 MG/DL (ref 0–100)
LYMPHOCYTES # BLD AUTO: 1.62 THOUSANDS/ÂΜL (ref 0.6–4.47)
LYMPHOCYTES NFR BLD AUTO: 27 % (ref 14–44)
MCH RBC QN AUTO: 30.5 PG (ref 26.8–34.3)
MCHC RBC AUTO-ENTMCNC: 33.5 G/DL (ref 31.4–37.4)
MCV RBC AUTO: 91 FL (ref 82–98)
MONOCYTES # BLD AUTO: 0.46 THOUSAND/ÂΜL (ref 0.17–1.22)
MONOCYTES NFR BLD AUTO: 8 % (ref 4–12)
NEUTROPHILS # BLD AUTO: 3.72 THOUSANDS/ÂΜL (ref 1.85–7.62)
NEUTS SEG NFR BLD AUTO: 60 % (ref 43–75)
NONHDLC SERPL-MCNC: 96 MG/DL
NRBC BLD AUTO-RTO: 0 /100 WBCS
PLATELET # BLD AUTO: 293 THOUSANDS/UL (ref 149–390)
PMV BLD AUTO: 11.2 FL (ref 8.9–12.7)
POTASSIUM SERPL-SCNC: 4 MMOL/L (ref 3.5–5.3)
PROT SERPL-MCNC: 7.4 G/DL (ref 6.4–8.4)
RBC # BLD AUTO: 5.41 MILLION/UL (ref 3.88–5.62)
SODIUM SERPL-SCNC: 140 MMOL/L (ref 135–147)
TRIGL SERPL-MCNC: 76 MG/DL
TSH SERPL DL<=0.05 MIU/L-ACNC: 3.93 UIU/ML (ref 0.45–4.5)
WBC # BLD AUTO: 6.07 THOUSAND/UL (ref 4.31–10.16)

## 2023-03-21 NOTE — PROGRESS NOTES
INTERNAL MEDICINE OFFICE VISIT  [facility] AT [address], 0 [facility]  Tel: [phone]      NAME: Berta Spatz  AGE: 65 y o  SEX: male  : 1960   MRN: [id number]    DATE: 3/21/2023  TIME: 9:28 AM      Assessment and Plan:  1  Essential hypertension  Continue present medication    2  Hyperlipidemia LDL goal <100  Continue Crestor  - CBC and differential; Future  - Comprehensive metabolic panel; Future  - Lipid panel; Future  - TSH, 3rd generation; Future    3  Coronary arteriosclerosis in native artery  Follow-up with cardiology    4  Gastroesophageal reflux disease without esophagitis  Continue pantoprazole    5  PND (post-nasal drip)  Was advised to try taking the Flonase nasal spray along with the Claritin to see if it helps  Otherwise he will need to see ENT  6  Obesity (BMI 30 0-34  9)  BMI Counseling: Body mass index is 30 6 kg/m²  The BMI is above normal  Nutrition recommendations include decreasing portion sizes, encouraging healthy choices of fruits and vegetables and moderation in carbohydrate intake  Exercise recommendations include moderate physical activity 150 minutes/week  Rationale for BMI follow-up plan is due to patient being overweight or obese  - Counseling Documentation: patient was counseled regarding: diagnostic results, instructions for management, risk factor reductions, prognosis, patient and family education, risks and benefits of treatment options and importance of compliance with treatment  - Medication Side Effects: Adverse side effects of medications were reviewed with the patient/guardian today  Return for follow up visit in 6 months or earlier, if needed        Chief Complaint:  Chief Complaint   Patient presents with   • Follow-up     6 month          History of Present Illness:   Blood pressure stable on present medication  Takes Crestor regularly  GERD is stable on the pantoprazole  He has a postnasal drip and mucus production off-and-on during the day which he feels started after he had his stricture dilated during his EGD  Active Problem List:  Patient Active Problem List   Diagnosis   • Coronary arteriosclerosis in native artery   • Gastroesophageal reflux disease without esophagitis   • Hyperlipidemia LDL goal <100   • Essential hypertension   • Obesity (BMI 30 0-34  9)   • Benign prostatic hyperplasia with weak urinary stream   • Other dysphagia   • PND (post-nasal drip)         Past Medical History:  Past Medical History:   Diagnosis Date   • CAD (coronary artery disease) 2009    50% RCA stenosis on coronary angiogram    • GERD (gastroesophageal reflux disease)    • Hyperlipidemia    • Hypertension    • Liver nodule 2/7/2022   • Obesity    • Schatzki's ring 02/07/2014         Past Surgical History:  Past Surgical History:   Procedure Laterality Date   • CARDIAC CATHETERIZATION  2009, 2018   • COLONOSCOPY  10/13/2011   • ESOPHAGOGASTRODUODENOSCOPY           Family History:  Family History   Problem Relation Age of Onset   • Coronary artery disease Father    • Heart attack Father 40   • Arrhythmia Father    • Cancer Maternal Aunt         GI         Social History:  Social History     Socioeconomic History   • Marital status: /Civil Union     Spouse name: None   • Number of children: None   • Years of education: None   • Highest education level: None   Occupational History   • None   Tobacco Use   • Smoking status: Never   • Smokeless tobacco: Never   Vaping Use   • Vaping Use: Never used   Substance and Sexual Activity   • Alcohol use: Yes     Comment: socially   • Drug use: No   • Sexual activity: Yes     Partners: Female   Other Topics Concern   • None   Social History Narrative   • None     Social Determinants of Health     Financial Resource Strain: Not on file   Food Insecurity: Not on file   Transportation Needs: Not on file   Physical Activity: Not on file   Stress: No Stress Concern Present   • Feeling of Stress : Not at all   Social Connections: Not on file   Intimate Partner Violence: Not on file   Housing Stability: Not on file         Allergies:   Allergies   Allergen Reactions   • Dilaudid [Hydromorphone Hcl] Other (See Comments)     Pt states he had a bad experience, blood pressure dropped          Medications:    Current Outpatient Medications:   •  aspirin 81 MG tablet, Take by mouth daily , Disp: , Rfl:   •  Coenzyme Q10 (CO Q 10) 10 MG CAPS, Take by mouth daily , Disp: , Rfl:   •  glucosamine-chondroitin 500-400 MG tablet, Take 1 tablet by mouth in the morning, Disp: , Rfl:   •  loratadine (CLARITIN) 10 mg tablet, Take 10 mg by mouth daily, Disp: , Rfl:   •  Misc Natural Products (TART CHERRY ADVANCED PO), Take by mouth, Disp: , Rfl:   •  multivitamin (THERAGRAN) TABS, Take 1 tablet by mouth daily, Disp: , Rfl:   •  nebivolol (BYSTOLIC) 5 mg tablet, TAKE 1 TABLET DAILY, Disp: 90 tablet, Rfl: 3  •  Omega-3 Fatty Acids ([employer] FISH OIL) 1200 MG CAPS, [employer] Fish Oil 1200 MG Oral Capsule; daily, Disp: , Rfl:   •  pantoprazole (PROTONIX) 40 mg tablet, Take 1 tablet (40 mg total) by mouth daily before breakfast, Disp: 90 tablet, Rfl: 3  •  rosuvastatin (CRESTOR) 20 MG tablet, TAKE 1 TABLET DAILY (NEED APPOINTMENT FOR FURTHER REFILLS), Disp: 90 tablet, Rfl: 3  •  tamsulosin (FLOMAX) 0 4 mg, TAKE 1 CAPSULE DAILY WITH DINNER, Disp: 90 capsule, Rfl: 3  •  TURMERIC PO, Take by mouth, Disp: , Rfl:       The following portions of the patient's history were reviewed and updated as appropriate: past medical history, past surgical history, family history, social history, allergies, current medications and active problem list       Review of Systems:  Constitutional: Denies fever, chills, weight gain, weight loss, fatigue  Eyes: Denies eye redness, eye discharge, double vision, change in visual acuity  ENT: Denies hearing loss, tinnitus, sneezing, nasal congestion, nasal discharge, sore throat Respiratory: Denies cough, expectoration, hemoptysis, shortness of breath, wheezing  Cardiovascular: Denies chest pain, palpitations, lower extremity swelling, orthopnea, PND  Gastrointestinal: Denies abdominal pain, heartburn, nausea, vomiting, hematemesis, diarrhea, bloody stools  Genito-Urinary: Denies dysuria, frequency, difficulty in micturition, nocturia, incontinence  Musculoskeletal: Denies back pain, joint pain, muscle pain  Neurologic: Denies confusion, lightheadedness, syncope, headache, focal weakness, sensory changes, seizures  Endocrine: Denies polyuria, polydipsia, temperature intolerance  Allergy and Immunology: Denies hives, insect bite sensitivity  Hematological and Lymphatic: Denies bleeding problems, swollen glands   Psychological: Denies depression, suicidal ideation, anxiety, panic, mood swings  Dermatological: Denies pruritus, rash, skin lesion changes      Vitals:  Vitals:    03/21/23 0906   BP: 126/84   Pulse: 63   Resp: 16   Temp: 98 2 °F (36 8 °C)   SpO2: 97%       Body mass index is 30 6 kg/m²  Weight (last 2 days)     Date/Time Weight    03/21/23 0906 86 (189 6)            Physical Examination:  General: Patient is not in acute distress  Awake, alert, responding to commands  No weight gain or loss  Head: Normocephalic  Atraumatic  Eyes: Conjunctiva and lids with no swelling, erythema or discharge  Both pupils normal sized, round and reactive to light  Sclera nonicteric  ENT: External examination of nose and ear normal  Otoscopic examination shows translucent tympanic membranes with patent canals without erythema  Oropharynx moist with no erythema, edema, exudate or lesions  Neck: Supple  JVP not raised  Trachea midline  No masses  No thyromegaly  Lungs: No signs of increased work of breathing or respiratory distress  Bilateral bronchovascular breath sounds with no crackles or rhonchi  Chest wall: No tenderness  Cardiovascular: Normal PMI  No thrills  Regular rate and rhythm   S1 and S2 normal  No murmur, rub or gallop  Gastrointestinal: Abdomen soft, nontender  No guarding or rigidity  Liver and spleen not palpable  Bowel sounds present  Neurologic: Cranial nerves II-XII intact   Cortical functions normal  Motor system - Reflexes 2+ and symmetrical  Sensations normal  Musculoskeletal: Gait normal  No joint tenderness  Integumentary: Skin normal with no rash or lesions  Lymphatic: No palpable lymph nodes in neck, axilla or groin  Extremities: No clubbing, cyanosis, edema or varicosities  Psychological: Judgement and insight normal  Mood and affect normal      Laboratory Results:  CBC with diff:   Lab Results   Component Value Date    WBC 6 80 09/13/2022    WBC 8 4 08/07/2014    RBC 5 17 09/13/2022    RBC 4 97 08/07/2014    HGB 15 7 09/13/2022    HGB 14 5 08/07/2014    HCT 47 7 09/13/2022    HCT 43 8 08/07/2014    MCV 92 09/13/2022    MCV 88 08/07/2014    MCH 30 4 09/13/2022    MCH 29 3 08/07/2014    RDW 13 5 09/13/2022    RDW 13 2 08/07/2014     09/13/2022     08/07/2014       CMP:  Lab Results   Component Value Date    CREATININE 0 82 09/13/2022    CREATININE 0 8 08/07/2014    BUN 12 09/13/2022    BUN 12 08/07/2014     08/07/2014    K 3 9 09/13/2022    K 3 8 08/07/2014     (H) 09/13/2022     08/07/2014    CO2 24 09/13/2022    CO2 24 2 08/07/2014    GLUCOSE 78 08/07/2014    PROT 7 0 08/07/2014    ALKPHOS 45 (L) 09/13/2022    ALKPHOS 83 08/07/2014    ALT 55 09/13/2022    ALT 28 08/07/2014    AST 24 09/13/2022    AST 21 08/07/2014       No results found for: HGBA1C, MG, PHOS    No results found for: TROPONINI, CKMB, CKTOTAL    Lipid Profile:   Lab Results   Component Value Date    CHOL 138 08/07/2014     Lab Results   Component Value Date    HDL 62 09/13/2022    HDL 82 03/02/2022     Lab Results   Component Value Date    LDLCALC 55 09/13/2022    LDLCALC 55 03/02/2022     Lab Results   Component Value Date    TRIG 108 09/13/2022    TRIG 72 03/02/2022       Imaging Results:  EGD  Narrative: Table formatting from the original result was not included  [address] [facility] [address]  [phone]    DATE OF SERVICE:  12/08/22    PHYSICIAN(S):  Attending:   Arnulfo Lao MD     Fellow:   No Staff Documented     INDICATION:  Dysphagia, unspecified type    POST-OP DIAGNOSIS:  See the impression below  PREPROCEDURE:  Informed consent was obtained for the procedure, including sedation  Risks of perforation, hemorrhage, adverse drug reaction and aspiration   were discussed  The patient was placed in the left lateral decubitus   position  Patient was explained about the risks and benefits of the procedure  Risks   including but not limited to bleeding, infection, and perforation were   explained in detail  Also explained about less than 100% sensitivity with   the exam and other alternatives  DETAILS OF PROCEDURE:  Patient was taken to the procedure room where a time out was performed to   confirm correct patient and correct procedure  The patient underwent   monitored anesthesia care, which was administered by an anesthesia   professional  The patient's blood pressure, heart rate, level of   consciousness, respirations and oxygen were monitored throughout the   procedure  The scope was advanced to the second part of the duodenum  Retroflexion was performed in the cardia, fundus and incisura  The   patient's estimated blood loss was minimal (<5 mL)  The procedure was not   difficult  The patient tolerated the procedure well  There were no   apparent complications       ANESTHESIA INFORMATION:  ASA: III  Anesthesia Type: IV Sedation with Anesthesia    MEDICATIONS:  No administrations occurring from 1020 to 1027 on 12/08/22     FINDINGS:  Inflamed intrinsic stricture (traversable) with length of less than 1 cm   in the GE junction and Z-line (35 cm from the incisors); performed cold   forceps biopsy; dilated with Scott-Candice dilator using guidewire to 47   Fr ending size  The upper third of the esophagus, middle third of the esophagus, lower   third of the esophagus, cardia, fundus of the stomach, body of the   stomach, incisura, antrum, prepyloric region, pylorus, duodenal bulb, 2nd   part of the duodenum and 3rd part of the duodenum appeared normal     SPECIMENS:  ID Type Source Tests Collected by Time Destination   A :   Esophagus   12/8/2022 10:26 AM    Impression: Mild stricture with inflammation at the GE junction status postbiopsy and   47 Portuguese savory dilation over guidewire    RECOMMENDATION:  Protonix 40 mg p o  every morning half hour AC breakfast  Follow-up biopsy results in 3 weeks          Clary Ng MD        Health Maintenance:  Health Maintenance   Topic Date Due   • DTaP,Tdap,and Td Vaccines (1 - Tdap) Never done   • Annual Physical  06/13/2020   • COVID-19 Vaccine (3 - Booster for Moderna series) 06/10/2021   • Influenza Vaccine (1) 09/01/2022   • BMI: Followup Plan  03/08/2023   • Depression Screening  09/20/2023   • BMI: Adult  03/21/2024   • Colorectal Cancer Screening  10/25/2026   • Hepatitis C Screening  Completed   • Pneumococcal Vaccine: Pediatrics (0 to 5 Years) and At-Risk Patients (6 to 59 Years)  Aged Out   • HIB Vaccine  Aged Out   • IPV Vaccine  Aged Out   • Hepatitis A Vaccine  Aged Out   • Meningococcal ACWY Vaccine  Aged Out   • HPV Vaccine  Aged Out   • HIV Screening  Discontinued     Immunization History   Administered Date(s) Administered   • COVID-19 MODERNA VACC 0 5 ML IM 03/17/2021, 04/15/2021   • INFLUENZA 12/16/2008, 09/17/2018   • Influenza, injectable, quadrivalent, preservative free 0 5 mL 09/17/2018, 09/23/2019   • Influenza, seasonal, injectable 10/01/2017   • Pneumococcal Polysaccharide PPV23 12/16/2008         Liya Louis MD  3/21/2023,9:28 AM

## 2023-09-15 ENCOUNTER — RA CDI HCC (OUTPATIENT)
Dept: OTHER | Facility: HOSPITAL | Age: 63
End: 2023-09-15

## 2023-09-15 NOTE — PROGRESS NOTES
[address] [facility] coding opportunities       Chart reviewed, no opportunity found: CHART REVIEWED, NO OPPORTUNITY FOUND        Patients Insurance        Commercial Insurance: [employer]

## 2023-09-19 ENCOUNTER — APPOINTMENT (OUTPATIENT)
Age: 63
End: 2023-09-19
Payer: COMMERCIAL

## 2023-09-19 DIAGNOSIS — E78.5 HYPERLIPIDEMIA LDL GOAL <100: Chronic | ICD-10-CM

## 2023-09-19 LAB
ALBUMIN SERPL BCP-MCNC: 4.5 G/DL (ref 3.5–5)
ALP SERPL-CCNC: 44 U/L (ref 34–104)
ALT SERPL W P-5'-P-CCNC: 35 U/L (ref 7–52)
ANION GAP SERPL CALCULATED.3IONS-SCNC: 8 MMOL/L
AST SERPL W P-5'-P-CCNC: 26 U/L (ref 13–39)
BASOPHILS # BLD AUTO: 0.04 THOUSANDS/ÂΜL (ref 0–0.1)
BASOPHILS NFR BLD AUTO: 1 % (ref 0–1)
BILIRUB SERPL-MCNC: 1.54 MG/DL (ref 0.2–1)
BUN SERPL-MCNC: 19 MG/DL (ref 5–25)
CALCIUM SERPL-MCNC: 9.3 MG/DL (ref 8.4–10.2)
CHLORIDE SERPL-SCNC: 107 MMOL/L (ref 96–108)
CHOLEST SERPL-MCNC: 158 MG/DL
CO2 SERPL-SCNC: 24 MMOL/L (ref 21–32)
CREAT SERPL-MCNC: 0.88 MG/DL (ref 0.6–1.3)
EOSINOPHIL # BLD AUTO: 0.15 THOUSAND/ÂΜL (ref 0–0.61)
EOSINOPHIL NFR BLD AUTO: 2 % (ref 0–6)
ERYTHROCYTE [DISTWIDTH] IN BLOOD BY AUTOMATED COUNT: 13.2 % (ref 11.6–15.1)
GFR SERPL CREATININE-BSD FRML MDRD: 92 ML/MIN/1.73SQ M
GLUCOSE P FAST SERPL-MCNC: 84 MG/DL (ref 65–99)
HCT VFR BLD AUTO: 46.8 % (ref 36.5–49.3)
HDLC SERPL-MCNC: 58 MG/DL
HGB BLD-MCNC: 15.6 G/DL (ref 12–17)
IMM GRANULOCYTES # BLD AUTO: 0.02 THOUSAND/UL (ref 0–0.2)
IMM GRANULOCYTES NFR BLD AUTO: 0 % (ref 0–2)
LDLC SERPL CALC-MCNC: 72 MG/DL (ref 0–100)
LYMPHOCYTES # BLD AUTO: 1.63 THOUSANDS/ÂΜL (ref 0.6–4.47)
LYMPHOCYTES NFR BLD AUTO: 24 % (ref 14–44)
MCH RBC QN AUTO: 30.6 PG (ref 26.8–34.3)
MCHC RBC AUTO-ENTMCNC: 33.3 G/DL (ref 31.4–37.4)
MCV RBC AUTO: 92 FL (ref 82–98)
MONOCYTES # BLD AUTO: 0.45 THOUSAND/ÂΜL (ref 0.17–1.22)
MONOCYTES NFR BLD AUTO: 7 % (ref 4–12)
NEUTROPHILS # BLD AUTO: 4.66 THOUSANDS/ÂΜL (ref 1.85–7.62)
NEUTS SEG NFR BLD AUTO: 66 % (ref 43–75)
NONHDLC SERPL-MCNC: 100 MG/DL
NRBC BLD AUTO-RTO: 0 /100 WBCS
PLATELET # BLD AUTO: 268 THOUSANDS/UL (ref 149–390)
PMV BLD AUTO: 10.8 FL (ref 8.9–12.7)
POTASSIUM SERPL-SCNC: 3.7 MMOL/L (ref 3.5–5.3)
PROT SERPL-MCNC: 6.7 G/DL (ref 6.4–8.4)
RBC # BLD AUTO: 5.09 MILLION/UL (ref 3.88–5.62)
SODIUM SERPL-SCNC: 139 MMOL/L (ref 135–147)
TRIGL SERPL-MCNC: 142 MG/DL
TSH SERPL DL<=0.05 MIU/L-ACNC: 3.93 UIU/ML (ref 0.45–4.5)
WBC # BLD AUTO: 6.95 THOUSAND/UL (ref 4.31–10.16)

## 2023-09-19 PROCEDURE — 36415 COLL VENOUS BLD VENIPUNCTURE: CPT

## 2023-09-19 PROCEDURE — 85025 COMPLETE CBC W/AUTO DIFF WBC: CPT

## 2023-09-19 PROCEDURE — 80053 COMPREHEN METABOLIC PANEL: CPT

## 2023-09-19 PROCEDURE — 80061 LIPID PANEL: CPT

## 2023-09-19 PROCEDURE — 84443 ASSAY THYROID STIM HORMONE: CPT

## 2023-10-17 ENCOUNTER — APPOINTMENT (OUTPATIENT)
Age: 63
End: 2023-10-17
Payer: COMMERCIAL

## 2023-10-17 DIAGNOSIS — Z12.5 SCREENING FOR PROSTATE CANCER: ICD-10-CM

## 2023-10-17 LAB — PSA SERPL-MCNC: 0.73 NG/ML (ref 0–4)

## 2023-10-17 PROCEDURE — 36415 COLL VENOUS BLD VENIPUNCTURE: CPT

## 2023-10-17 PROCEDURE — 84153 ASSAY OF PSA TOTAL: CPT

## 2023-10-23 NOTE — PROGRESS NOTES
10/24/2023      Chief Complaint   Patient presents with    Follow-up     BPH with LUTS  Prostate cancer         Assessment and Plan    59 y.o. male     1. BPH with lower urinary tract symptoms   -overall happy with urination with Flomax and saw palmetto      2. Prostate cancer screening  -most recent PSA (10/17/23) 0.73, stable  - AL today benign   - PSA in 1 year. Discussed routine follow up with PCP for refills and routine prostate cancer screening. Patient agreeable. Follow up with urology on an as needed basis  - Call with any questions or concerns in the meantime  - All questions answered; patient understands and agrees with plan       History of Present Illness  Mary Oviedo is a 59 y.o. male patient with history of BPH with LUTS here for follow up. Patient has been taking Flomax and saw palmetto with maximal benefit. Denies any new or worsening urinary symptoms today. Most recent PSA 0.73, stable. Denies family history of  malignancies. Review of Systems   Constitutional:  Negative for activity change, appetite change, chills and fever. HENT:  Negative for congestion and trouble swallowing. Respiratory:  Negative for cough and shortness of breath. Cardiovascular:  Negative for chest pain, palpitations and leg swelling. Gastrointestinal:  Negative for abdominal pain, constipation, diarrhea, nausea and vomiting. Genitourinary:  Negative for difficulty urinating, dysuria, flank pain, frequency, hematuria and urgency. Musculoskeletal:  Negative for back pain and gait problem. Skin:  Negative for wound. Allergic/Immunologic: Negative for immunocompromised state. Neurological:  Negative for dizziness and syncope. Hematological:  Does not bruise/bleed easily. Psychiatric/Behavioral:  Negative for confusion. All other systems reviewed and are negative.           AUA SYMPTOM SCORE      Flowsheet Row Most Recent Value   AUA SYMPTOM SCORE    How often have you had a sensation of not emptying your bladder completely after you finished urinating? 0 (P)     How often have you had to urinate again less than two hours after you finished urinating? 4 (P)     How often have you found you stopped and started again several times when you urinate? 3 (P)     How often have you found it difficult to postpone urination? 2 (P)     How often have you had a weak urinary stream? 4 (P)     How often have you had to push or strain to begin urination? 0 (P)     How many times did you most typically get up to urinate from the time you went to bed at night until the time you got up in the morning? 3 (P)     Quality of Life: If you were to spend the rest of your life with your urinary condition just the way it is now, how would you feel about that? 3 (P)     AUA SYMPTOM SCORE 16 (P)              Vitals  Vitals:    10/24/23 0738   BP: 122/66   Pulse: 61   SpO2: 98%   Weight: 91.9 kg (202 lb 9.6 oz)   Height: 5' 6" (1.676 m)       Physical Exam  Constitutional:       General: He is not in acute distress. Appearance: Normal appearance. He is not ill-appearing, toxic-appearing or diaphoretic. HENT:      Head: Normocephalic. Nose: No congestion. Eyes:      General: No scleral icterus. Right eye: No discharge. Left eye: No discharge. Conjunctiva/sclera: Conjunctivae normal.      Pupils: Pupils are equal, round, and reactive to light. Pulmonary:      Effort: Pulmonary effort is normal.   Genitourinary:     Comments: Prostate smooth, symmetrical, no palpable nodules    Musculoskeletal:      Cervical back: Normal range of motion. Skin:     General: Skin is warm and dry. Coloration: Skin is not jaundiced or pale. Findings: No bruising, erythema, lesion or rash. Neurological:      General: No focal deficit present. Mental Status: He is alert and oriented to person, place, and time. Mental status is at baseline.       Gait: Gait normal.   Psychiatric:         Mood and Affect: Mood normal.         Behavior: Behavior normal.         Thought Content:  Thought content normal.         Judgment: Judgment normal.           Past History  Past Medical History:   Diagnosis Date    CAD (coronary artery disease) 2009    50% RCA stenosis on coronary angiogram     GERD (gastroesophageal reflux disease)     Hyperlipidemia     Hypertension     Liver nodule 2/7/2022    Obesity     Schatzki's ring 02/07/2014     Social History     Socioeconomic History    Marital status: /Civil Union     Spouse name: None    Number of children: None    Years of education: None    Highest education level: None   Occupational History    None   Tobacco Use    Smoking status: Never    Smokeless tobacco: Never   Vaping Use    Vaping Use: Never used   Substance and Sexual Activity    Alcohol use: Yes     Comment: socially    Drug use: No    Sexual activity: Yes     Partners: Female   Other Topics Concern    None   Social History Narrative    None     Social Determinants of Health     Financial Resource Strain: Not on file   Food Insecurity: Not on file   Transportation Needs: Not on file   Physical Activity: Inactive (2/25/2021)    Exercise Vital Sign     Days of Exercise per Week: 0 days     Minutes of Exercise per Session: 0 min   Stress: No Stress Concern Present (9/20/2022)    [address] [facility]     Feeling of Stress : Not at all   Social Connections: Not on file   Intimate Partner Violence: Not on file   Housing Stability: Not on file     Social History     Tobacco Use   Smoking Status Never   Smokeless Tobacco Never     Family History   Problem Relation Age of Onset    Coronary artery disease Father     Heart attack Father 40    Arrhythmia Father     Cancer Maternal Aunt         GI       The following portions of the patient's history were reviewed and updated as appropriate: allergies, current medications, past medical history, past social history, past surgical history and problem list.    Results  No results found for this or any previous visit (from the past 1 hour(s)).]  Lab Results   Component Value Date    PSA 0.73 10/17/2023    PSA 0.7 09/13/2022    PSA 0.7 03/02/2022    PSA 0.7 09/02/2021     Lab Results   Component Value Date    GLUCOSE 78 08/07/2014    CALCIUM 9.3 09/19/2023     08/07/2014    K 3.7 09/19/2023    CO2 24 09/19/2023     09/19/2023    BUN 19 09/19/2023    CREATININE 0.88 09/19/2023     Lab Results   Component Value Date    WBC 6.95 09/19/2023    HGB 15.6 09/19/2023    HCT 46.8 09/19/2023    MCV 92 09/19/2023     09/19/2023       Ena Hargrove PA-C

## 2023-10-24 ENCOUNTER — OFFICE VISIT (OUTPATIENT)
Dept: UROLOGY | Facility: CLINIC | Age: 63
End: 2023-10-24
Payer: COMMERCIAL

## 2023-10-24 VITALS
OXYGEN SATURATION: 98 % | HEART RATE: 61 BPM | SYSTOLIC BLOOD PRESSURE: 122 MMHG | HEIGHT: 66 IN | DIASTOLIC BLOOD PRESSURE: 66 MMHG | WEIGHT: 202.6 LBS | BODY MASS INDEX: 32.56 KG/M2

## 2023-10-24 DIAGNOSIS — N40.1 BENIGN PROSTATIC HYPERPLASIA WITH WEAK URINARY STREAM: Primary | ICD-10-CM

## 2023-10-24 DIAGNOSIS — Z12.5 SCREENING FOR PROSTATE CANCER: ICD-10-CM

## 2023-10-24 DIAGNOSIS — R39.12 BENIGN PROSTATIC HYPERPLASIA WITH WEAK URINARY STREAM: Primary | ICD-10-CM

## 2023-10-24 PROCEDURE — 99213 OFFICE O/P EST LOW 20 MIN: CPT | Performed by: PHYSICIAN ASSISTANT

## 2023-10-24 RX ORDER — TAMSULOSIN HYDROCHLORIDE 0.4 MG/1
0.4 CAPSULE ORAL
Qty: 90 CAPSULE | Refills: 3 | Status: SHIPPED | OUTPATIENT
Start: 2023-10-24

## 2023-11-16 DIAGNOSIS — K22.2 ESOPHAGEAL STRICTURE: ICD-10-CM

## 2023-11-16 RX ORDER — PANTOPRAZOLE SODIUM 40 MG/1
40 TABLET, DELAYED RELEASE ORAL
Qty: 90 TABLET | Refills: 1 | Status: SHIPPED | OUTPATIENT
Start: 2023-11-16

## 2023-12-14 ENCOUNTER — OFFICE VISIT (OUTPATIENT)
Age: 63
End: 2023-12-14
Payer: COMMERCIAL

## 2023-12-14 VITALS
TEMPERATURE: 97.2 F | WEIGHT: 201 LBS | HEART RATE: 64 BPM | OXYGEN SATURATION: 97 % | DIASTOLIC BLOOD PRESSURE: 76 MMHG | BODY MASS INDEX: 32.3 KG/M2 | SYSTOLIC BLOOD PRESSURE: 112 MMHG | HEIGHT: 66 IN

## 2023-12-14 DIAGNOSIS — K22.2 ESOPHAGEAL STRICTURE: ICD-10-CM

## 2023-12-14 DIAGNOSIS — R39.12 BENIGN PROSTATIC HYPERPLASIA WITH WEAK URINARY STREAM: ICD-10-CM

## 2023-12-14 DIAGNOSIS — Z23 ENCOUNTER FOR IMMUNIZATION: ICD-10-CM

## 2023-12-14 DIAGNOSIS — N40.1 BENIGN PROSTATIC HYPERPLASIA WITH WEAK URINARY STREAM: ICD-10-CM

## 2023-12-14 DIAGNOSIS — I10 ESSENTIAL HYPERTENSION: Primary | Chronic | ICD-10-CM

## 2023-12-14 DIAGNOSIS — E78.5 HYPERLIPIDEMIA LDL GOAL <100: Chronic | ICD-10-CM

## 2023-12-14 DIAGNOSIS — I25.10 CORONARY ARTERIOSCLEROSIS IN NATIVE ARTERY: ICD-10-CM

## 2023-12-14 PROCEDURE — 99214 OFFICE O/P EST MOD 30 MIN: CPT | Performed by: INTERNAL MEDICINE

## 2023-12-14 PROCEDURE — 90686 IIV4 VACC NO PRSV 0.5 ML IM: CPT | Performed by: INTERNAL MEDICINE

## 2023-12-14 PROCEDURE — 90471 IMMUNIZATION ADMIN: CPT | Performed by: INTERNAL MEDICINE

## 2023-12-14 NOTE — PROGRESS NOTES
INTERNAL MEDICINE OFFICE VISIT  [facility] AT [facility], [address] [place] [address]  Tel: [phone]      NAME: Mary Kate Metzger  AGE: 59 y.o. SEX: male  : 1960   MRN: [id number]    DATE: 2023  TIME: 8:44 AM    Assessment and Plan:  1. Essential hypertension  Blood pressure is well-controlled, continue the Bystolic    - CBC and differential; Future  - Comprehensive metabolic panel; Future  - Lipid panel; Future  - TSH, 3rd generation; Future    2. Hyperlipidemia LDL goal <100  Continue Crestor    3. Benign prostatic hyperplasia with weak urinary stream  Continue tamsulosin and follow-up with urology    4. Coronary arteriosclerosis in native artery  Follow-up with cardiology    5. Esophageal stricture  Recently had EGD done and a stricture was found at the GE junction which was dilated. Symptoms are better now. Continue to take pantoprazole and follow-up with GI if symptoms recur. Was advised to elevate his head at night if acid regurgitation is present and also to avoid late night meals. 6. Encounter for immunization    - influenza vaccine, quadrivalent, 0.5 mL, preservative-free, for adult and pediatric patients 6 mos+ (SEGUNDO, [address], FLULAVAL, FLUZONE)      - Counseling Documentation: patient was counseled regarding: diagnostic results, instructions for management, risk factor reductions, prognosis, patient and family education, risks and benefits of treatment options, and importance of compliance with treatment  - Medication Side Effects: Adverse side effects of medications were reviewed with the patient/guardian today. Return for follow up visit in 6 months or earlier, if needed.       Chief Complaint:  Chief Complaint   Patient presents with    Annual Exam    Follow-up         History of Present Illness:   Blood pressure is well-controlled  Cholesterol is stable on the Crestor  No symptoms of chest pain or shortness of breath but he follows up with cardiology regularly  BPH is controlled with the tamsulosin  He was recently treated for the esophageal stricture after he had dysphagia. Now the symptoms are better but he does have regurgitation of food and acid at times when he lays down. Active Problem List:  Patient Active Problem List   Diagnosis    Coronary arteriosclerosis in native artery    Gastroesophageal reflux disease without esophagitis    Hyperlipidemia LDL goal <100    Essential hypertension    Obesity (BMI 30.0-34. 9)    Benign prostatic hyperplasia with weak urinary stream    Other dysphagia    PND (post-nasal drip)    Esophageal stricture         Past Medical History:  Past Medical History:   Diagnosis Date    CAD (coronary artery disease) 2009    50% RCA stenosis on coronary angiogram     GERD (gastroesophageal reflux disease)     Hyperlipidemia     Hypertension     Liver nodule 2/7/2022    Obesity     Schatzki's ring 02/07/2014         Past Surgical History:  Past Surgical History:   Procedure Laterality Date    CARDIAC CATHETERIZATION  2009, 2018    COLONOSCOPY  10/13/2011    ESOPHAGOGASTRODUODENOSCOPY           Family History:  Family History   Problem Relation Age of Onset    Coronary artery disease Father     Heart attack Father 40    Arrhythmia Father     Cancer Maternal Aunt         GI         Social History:  Social History     Socioeconomic History    Marital status: /Civil Union     Spouse name: None    Number of children: None    Years of education: None    Highest education level: None   Occupational History    None   Tobacco Use    Smoking status: Never    Smokeless tobacco: Never   Vaping Use    Vaping status: Never Used   Substance and Sexual Activity    Alcohol use: Yes     Comment: socially    Drug use: No    Sexual activity: Yes     Partners: Female   Other Topics Concern    None   Social History Narrative    None     Social Determinants of Health     Financial Resource Strain: Not on file   Food Insecurity: Not on file   Transportation Needs: Not on file   Physical Activity: Inactive (2/25/2021)    Exercise Vital Sign     Days of Exercise per Week: 0 days     Minutes of Exercise per Session: 0 min   Stress: No Stress Concern Present (9/20/2022)    [facility]     Feeling of Stress : Not at all   Social Connections: Not on file   Intimate Partner Violence: Not on file   Housing Stability: Not on file         Allergies:   Allergies   Allergen Reactions    Dilaudid [Hydromorphone Hcl] Other (See Comments)     Pt states he had a bad experience, blood pressure dropped          Medications:    Current Outpatient Medications:     aspirin 81 MG tablet, Take by mouth daily , Disp: , Rfl:     Coenzyme Q10 (CO Q 10) 10 MG CAPS, Take by mouth daily , Disp: , Rfl:     glucosamine-chondroitin 500-400 MG tablet, Take 1 tablet by mouth in the morning, Disp: , Rfl:     loratadine (CLARITIN) 10 mg tablet, Take 10 mg by mouth daily, Disp: , Rfl:     Misc Natural Products (TART CHERRY ADVANCED PO), Take by mouth, Disp: , Rfl:     multivitamin (THERAGRAN) TABS, Take 1 tablet by mouth daily, Disp: , Rfl:     nebivolol (BYSTOLIC) 5 mg tablet, TAKE 1 TABLET DAILY, Disp: 90 tablet, Rfl: 3    Omega-3 Fatty Acids ([employer]S FISH OIL) 1200 MG CAPS, CVS Fish Oil 1200 MG Oral Capsule; daily, Disp: , Rfl:     pantoprazole (PROTONIX) 40 mg tablet, TAKE 1 TABLET DAILY BEFORE BREAKFAST, Disp: 90 tablet, Rfl: 1    rosuvastatin (CRESTOR) 20 MG tablet, TAKE 1 TABLET DAILY (NEED APPOINTMENT FOR FURTHER REFILLS), Disp: 90 tablet, Rfl: 3    tamsulosin (FLOMAX) 0.4 mg, Take 1 capsule (0.4 mg total) by mouth daily with dinner, Disp: 90 capsule, Rfl: 3    TURMERIC PO, Take by mouth, Disp: , Rfl:       The following portions of the patient's history were reviewed and updated as appropriate: past medical history, past surgical history, family history, social history, allergies, current medications and active problem list.      Review of Systems:  Constitutional: Denies fever, chills, weight gain, weight loss, fatigue  Eyes: Denies eye redness, eye discharge, double vision, change in visual acuity  ENT: Denies hearing loss, tinnitus, sneezing, nasal congestion, nasal discharge, sore throat   Respiratory: Denies cough, expectoration, hemoptysis, shortness of breath, wheezing  Cardiovascular: Denies chest pain, palpitations, lower extremity swelling, orthopnea, PND  Gastrointestinal: Denies abdominal pain, heartburn, nausea, vomiting, hematemesis, diarrhea, bloody stools  Genito-Urinary: Denies dysuria, frequency, difficulty in micturition, nocturia, incontinence  Musculoskeletal: Denies back pain, joint pain, muscle pain  Neurologic: Denies confusion, lightheadedness, syncope, headache, focal weakness, sensory changes, seizures  Endocrine: Denies polyuria, polydipsia, temperature intolerance  Allergy and Immunology: Denies hives, insect bite sensitivity  Hematological and Lymphatic: Denies bleeding problems, swollen glands   Psychological: Denies depression, suicidal ideation, anxiety, panic, mood swings  Dermatological: Denies pruritus, rash, skin lesion changes      Vitals:  Vitals:    12/14/23 0825   BP: 112/76   Pulse: 64   Temp: (!) 97.2 °F (36.2 °C)   SpO2: 97%       Body mass index is 32.44 kg/m². Weight (last 2 days)       Date/Time Weight    12/14/23 0825 91.2 (201)              Physical Examination:  General: Patient is not in acute distress. Awake, alert, responding to commands. No weight gain or loss  Head: Normocephalic. Atraumatic  Eyes: Conjunctiva and lids with no swelling, erythema or discharge. Both pupils normal sized, round and reactive to light. Sclera nonicteric  ENT: External examination of nose and ear normal. Otoscopic examination shows translucent tympanic membranes with patent canals without erythema.  Oropharynx moist with no erythema, edema, exudate or lesions  Neck: Supple. JVP not raised. Trachea midline. No masses. No thyromegaly  Lungs: No signs of increased work of breathing or respiratory distress. Bilateral bronchovascular breath sounds with no crackles or rhonchi  Chest wall: No tenderness  Cardiovascular: Normal PMI. No thrills. Regular rate and rhythm. S1 and S2 normal. No murmur, rub or gallop  Gastrointestinal: Abdomen soft, nontender. No guarding or rigidity. Liver and spleen not palpable. Bowel sounds present  Neurologic: Cranial nerves II-XII intact.  Cortical functions normal. Motor system - Reflexes 2+ and symmetrical. Sensations normal  Musculoskeletal: Gait normal. No joint tenderness  Integumentary: Skin normal with no rash or lesions  Lymphatic: No palpable lymph nodes in neck, axilla or groin  Extremities: No clubbing, cyanosis, edema or varicosities  Psychological: Judgement and insight normal. Mood and affect normal      Laboratory Results:  CBC with diff:   Lab Results   Component Value Date    WBC 6.95 09/19/2023    WBC 8.4 08/07/2014    RBC 5.09 09/19/2023    RBC 4.97 08/07/2014    HGB 15.6 09/19/2023    HGB 14.5 08/07/2014    HCT 46.8 09/19/2023    HCT 43.8 08/07/2014    MCV 92 09/19/2023    MCV 88 08/07/2014    MCH 30.6 09/19/2023    MCH 29.3 08/07/2014    RDW 13.2 09/19/2023    RDW 13.2 08/07/2014     09/19/2023     08/07/2014       CMP:  Lab Results   Component Value Date    CREATININE 0.88 09/19/2023    CREATININE 0.8 08/07/2014    BUN 19 09/19/2023    BUN 12 08/07/2014     08/07/2014    K 3.7 09/19/2023    K 3.8 08/07/2014     09/19/2023     08/07/2014    CO2 24 09/19/2023    CO2 24.2 08/07/2014    GLUCOSE 78 08/07/2014    PROT 7.0 08/07/2014    ALKPHOS 44 09/19/2023    ALKPHOS 83 08/07/2014    ALT 35 09/19/2023    ALT 28 08/07/2014    AST 26 09/19/2023    AST 21 08/07/2014       No results found for: "HGBA1C", "MG", "PHOS"    No results found for: "TROPONINI", "CKMB", "CKTOTAL"    Lipid Profile:   Lab Results Component Value Date    CHOL 138 08/07/2014     Lab Results   Component Value Date    HDL 58 09/19/2023    HDL 56 03/21/2023     Lab Results   Component Value Date    LDLCALC 72 09/19/2023    LDLCALC 81 03/21/2023     Lab Results   Component Value Date    TRIG 142 09/19/2023    TRIG 76 03/21/2023       Imaging Results:  EGD  Narrative: Table formatting from the original result was not included. [address] [place] [address] Endoscopy  [place] [id number]  [phone]    DATE OF SERVICE:  12/08/22    PHYSICIAN(S):  Attending:   Obinna Acevedo MD     Fellow:   No Staff Documented     INDICATION:  Dysphagia, unspecified type    POST-OP DIAGNOSIS:  See the impression below. PREPROCEDURE:  Informed consent was obtained for the procedure, including sedation. Risks of perforation, hemorrhage, adverse drug reaction and aspiration   were discussed. The patient was placed in the left lateral decubitus   position. Patient was explained about the risks and benefits of the procedure. Risks   including but not limited to bleeding, infection, and perforation were   explained in detail. Also explained about less than 100% sensitivity with   the exam and other alternatives. DETAILS OF PROCEDURE:  Patient was taken to the procedure room where a time out was performed to   confirm correct patient and correct procedure. The patient underwent   monitored anesthesia care, which was administered by an anesthesia   professional. The patient's blood pressure, heart rate, level of   consciousness, respirations and oxygen were monitored throughout the   procedure. The scope was advanced to the second part of the duodenum. Retroflexion was performed in the cardia, fundus and incisura. The   patient's estimated blood loss was minimal (<5 mL). The procedure was not   difficult. The patient tolerated the procedure well. There were no   apparent complications.      ANESTHESIA INFORMATION:  ASA: III  Anesthesia Type: IV Sedation with Anesthesia    MEDICATIONS:  No administrations occurring from 1020 to 1027 on 12/08/22     FINDINGS:  Inflamed intrinsic stricture (traversable) with length of less than 1 cm   in the GE junction and Z-line (35 cm from the incisors); performed cold   forceps biopsy; dilated with Saverica-Candice dilator using guidewire to 47   Fr ending size  The upper third of the esophagus, middle third of the esophagus, lower   third of the esophagus, cardia, fundus of the stomach, body of the   stomach, incisura, antrum, prepyloric region, pylorus, duodenal bulb, 2nd   part of the duodenum and 3rd part of the duodenum appeared normal.    SPECIMENS:  ID Type Source Tests Collected by Time Destination   A :   Esophagus   12/8/2022 10:26 AM    Impression: Mild stricture with inflammation at the GE junction status postbiopsy and   47 Serbian savory dilation over guidewire    RECOMMENDATION:  Protonix 40 mg p.o. every morning half hour AC breakfast  Follow-up biopsy results in 3 weeks          Yeny Ferraro MD        Health Maintenance:  Health Maintenance   Topic Date Due    DTaP,Tdap,and Td Vaccines (1 - Tdap) Never done    Annual Physical  06/13/2020    COVID-19 Vaccine (3 - 2023-24 season) 09/01/2023    BMI: Followup Plan  03/21/2024    BMI: Adult  10/24/2024    Depression Screening  12/14/2024    Colorectal Cancer Screening  10/25/2026    Hepatitis C Screening  Completed    Influenza Vaccine  Completed    Pneumococcal Vaccine: Pediatrics (0 to 5 Years) and At-Risk Patients (6 to 59 Years)  Aged Out    HIB Vaccine  Aged Out    IPV Vaccine  Aged Out    Hepatitis A Vaccine  Aged Out    Meningococcal ACWY Vaccine  Aged Out    HPV Vaccine  Aged Out    HIV Screening  Discontinued     Immunization History   Administered Date(s) Administered    COVID-19 MODERNA VACC 0.5 ML IM 03/17/2021, 04/15/2021    INFLUENZA 12/16/2008, 09/17/2018    Influenza, injectable, quadrivalent, preservative free 0.5 mL 09/17/2018, 09/23/2019, 12/14/2023    Influenza, seasonal, injectable 10/01/2017    Pneumococcal Polysaccharide PPV23 12/16/2008         Jewels Elam MD  12/14/2023,8:44 AM

## 2024-02-20 DIAGNOSIS — E78.5 HYPERLIPIDEMIA, UNSPECIFIED HYPERLIPIDEMIA TYPE: ICD-10-CM

## 2024-02-20 RX ORDER — ROSUVASTATIN CALCIUM 20 MG/1
TABLET, COATED ORAL
Qty: 90 TABLET | Refills: 3 | Status: SHIPPED | OUTPATIENT
Start: 2024-02-20

## 2024-02-22 ENCOUNTER — OFFICE VISIT (OUTPATIENT)
Age: 64
End: 2024-02-22
Payer: COMMERCIAL

## 2024-02-22 VITALS — BODY MASS INDEX: 32.3 KG/M2 | WEIGHT: 201 LBS | HEIGHT: 66 IN

## 2024-02-22 DIAGNOSIS — D22.5 MULTIPLE BENIGN MELANOCYTIC NEVI OF BOTH UPPER EXTREMITIES, BOTH LOWER EXTREMITIES, AND TRUNK: Primary | ICD-10-CM

## 2024-02-22 DIAGNOSIS — L82.1 SEBORRHEIC KERATOSIS: ICD-10-CM

## 2024-02-22 DIAGNOSIS — D22.62 MULTIPLE BENIGN MELANOCYTIC NEVI OF BOTH UPPER EXTREMITIES, BOTH LOWER EXTREMITIES, AND TRUNK: Primary | ICD-10-CM

## 2024-02-22 DIAGNOSIS — D22.72 MULTIPLE BENIGN MELANOCYTIC NEVI OF BOTH UPPER EXTREMITIES, BOTH LOWER EXTREMITIES, AND TRUNK: Primary | ICD-10-CM

## 2024-02-22 DIAGNOSIS — D22.61 MULTIPLE BENIGN MELANOCYTIC NEVI OF BOTH UPPER EXTREMITIES, BOTH LOWER EXTREMITIES, AND TRUNK: Primary | ICD-10-CM

## 2024-02-22 DIAGNOSIS — D22.71 MULTIPLE BENIGN MELANOCYTIC NEVI OF BOTH UPPER EXTREMITIES, BOTH LOWER EXTREMITIES, AND TRUNK: Primary | ICD-10-CM

## 2024-02-22 DIAGNOSIS — D18.01 CHERRY ANGIOMA: ICD-10-CM

## 2024-02-22 PROCEDURE — 99213 OFFICE O/P EST LOW 20 MIN: CPT | Performed by: DERMATOLOGY

## 2024-02-22 NOTE — PATIENT INSTRUCTIONS
What is skin cancer?  Skin cancer is unfortunately very common. That's why we are here to help you on your journey to healthy happy skin! There are two main types of skin cancer: melanoma and non-melanoma skin cancer. Melanoma is a form of skin cancer that often arises within an existing nevus or mole. However, this is not always the case. Melanoma can arise anywhere (not only where you have moles right now). Melanoma can run in families, so letting us know about your family history is important. Non-melanoma skin cancer is the most common type of cancer in the [place]. The two main types of non-melanoma skin cancers are basal cell carcinomas (BCC) and squamous cell carcinoma (SCC). These cancers tend to be less aggressive than melanomas but are still important to look for and treat.    What can I do to prevent skin cancer?  One of the largest risk factors for skin cancer is sun exposure or UV radiation. Therefore, sun protection is sosa! Here are some great tips for protecting yourself!  Try to avoid direct sun exposure during peak sun hours (10 AM to 2 PM)  Remember you get A LOT of sun even under cloud coverage and through care windows!  When choosing a sunscreen, look for one that says “broad spectrum” sunscreen. This means it protects you from more of the harmful UV rays.   Choose a sunscreen that is SPF 30 or greater for best protection.   Apply sunscreen to all sun-exposed skin and reapply every 2 hours.   Consider sun protective clothing! Great additions to your sun protective clothing wardrobe include broad brimmed hats, sunglasses, UPF clothing.  Avoid tanning salons. These have been shown to be very harmful in terms of your risk of skin cancer.   Avoid “base tans”. We now know that tans are dangerous (not just sun burns). If you want to have a tan for a trip, consider a spray tan!    Should I check my skin at home between my dermatology appointments?  Yes! It's always a great idea to look at your  skin on a regular basis. Here are some things to look for when monitoring your skin.   For melanoma, look for the ABCDE's!  A = Asymmetry. Look for a spot where one half does not match the other!  B = Borders. Look for a spot that has jagged, ragged or irregular borders.  C = Color. Look for a spot that is not evenly colored and often includes multiple colors, especially true black, red, white, blue, grey.   D = Diameter. Look for a spot that is larger than the size of a pencil eraser.  E = Evolution. If you ever have a spot that is changing in shape, color, size or symptoms (becomes itchy, painful or starts to bleed), always call us!  For non-melanoma skin cancers, look for a new, pink spot that is not going away, especially one that is itchy, painful or bleeding.     What should I do if I see a spot that is concerning for melanoma or non-melanoma skin cancer?  If you are ever concerned, call us! Do not wait for your next appointment. We want to help!

## 2024-02-22 NOTE — PROGRESS NOTES
"[facility] Dermatology Clinic Note     Patient Name: Ramon Johnson  Encounter Date: 2/22/24     Have you been cared for by a [facility] Dermatologist in the last 3 years and, if so, which description applies to you?    Yes.  I have been here within the last 3 years, and my medical history has NOT changed since that time.  I am MALE/not capable of bearing children.    REVIEW OF SYSTEMS:  Have you recently had or currently have any of the following? No changes in my recent health.   PAST MEDICAL HISTORY:  Have you personally ever had or currently have any of the following?  If \"YES,\" then please provide more detail. No changes in my medical history.   HISTORY OF IMMUNOSUPPRESSION: Do you have a history of any of the following:  Systemic Immunosuppression such as Diabetes, Biologic or Immunotherapy, Chemotherapy, Organ Transplantation, Bone Marrow Transplantation?  No     Answering \"YES\" requires the addition of the dotphrase \"IMMUNOSUPPRESSED\" as the first diagnosis of the patient's visit.   FAMILY HISTORY:  Any \"first degree relatives\" (parent, brother, sister, or child) with the following?    No changes in my family's known health.   PATIENT EXPERIENCE:    Do you want the Dermatologist to perform a COMPLETE skin exam today including a clinical examination under the \"bra and underwear\" areas?  Yes  If necessary, do we have your permission to call and leave a detailed message on your Preferred Phone number that includes your specific medical information?  Yes      Allergies   Allergen Reactions    Dilaudid [Hydromorphone Hcl] Other (See Comments)     Pt states he had a bad experience, blood pressure dropped       Current Outpatient Medications:     aspirin 81 MG tablet, Take by mouth daily , Disp: , Rfl:     Coenzyme Q10 (CO Q 10) 10 MG CAPS, Take by mouth daily , Disp: , Rfl:     glucosamine-chondroitin 500-400 MG tablet, Take 1 tablet by mouth in the morning, Disp: , Rfl:     loratadine (CLARITIN) 10 mg tablet, " "Take 10 mg by mouth daily, Disp: , Rfl:     Misc Natural Products (TART CHERRY ADVANCED PO), Take by mouth, Disp: , Rfl:     multivitamin (THERAGRAN) TABS, Take 1 tablet by mouth daily, Disp: , Rfl:     nebivolol (BYSTOLIC) 5 mg tablet, TAKE 1 TABLET DAILY, Disp: 90 tablet, Rfl: 3    Omega-3 Fatty Acids ([employer]S FISH OIL) 1200 MG CAPS, CVS Fish Oil 1200 MG Oral Capsule; daily, Disp: , Rfl:     pantoprazole (PROTONIX) 40 mg tablet, TAKE 1 TABLET DAILY BEFORE BREAKFAST, Disp: 90 tablet, Rfl: 1    rosuvastatin (CRESTOR) 20 MG tablet, TAKE 1 TABLET DAILY (NEED APPOINTMENT FOR FURTHER REFILLS), Disp: 90 tablet, Rfl: 3    tamsulosin (FLOMAX) 0.4 mg, Take 1 capsule (0.4 mg total) by mouth daily with dinner, Disp: 90 capsule, Rfl: 3    TURMERIC PO, Take by mouth, Disp: , Rfl:           Whom besides the patient is providing clinical information about today's encounter?   NO ADDITIONAL HISTORIAN (patient alone provided history)    Physical Exam and Assessment/Plan by Diagnosis:  MELANOCYTIC NEVI (\"Moles\")    Physical Exam:  Anatomic Location Affected: Mostly on sun-exposed areas of the upper and lower trunks  Morphological Description:  Scattered, 1-4mm round to ovoid, symmetrical-appearing, even bordered, skin colored to dark brown macules/papules, mostly in sun-exposed areas  Pertinent Positives:  Pertinent Negatives:    Additional History of Present Condition:  found on exam    Assessment and Plan:  Based on a thorough discussion of this condition and the management approach to it (including a comprehensive discussion of the known risks, side effects and potential benefits of treatment), the patient (family) agrees to implement the following specific plan:  Provided handout with information regarding the ABCDE's of moles   Recommend routine skin exams every 1 year follow up   Sun avoidance, protective clothing (known as UPF clothing), and the use of at least SPF 30 sunscreens is advised. Sunscreen should be reapplied every two " "hours when outside.   Assured benign    SEBORRHEIC KERATOSIS; NON-INFLAMED    Physical Exam:  Anatomic Location Affected:  scattered across trunk, extremities,  face  Morphological Description:  Flat and raised, waxy, smooth to warty textured, yellow to brownish-grey to dark brown to blackish, discrete, \"stuck-on\" appearing papules.  Pertinent Positives:  Pertinent Negatives:    Additional History of Present Condition:  Patient reports new bumps on the skin.  Denies itch, burn, pain, bleeding or ulceration.  Present constantly; nothing seems to make it worse or better.  No prior treatment.      Assessment and Plan:  Based on a thorough discussion of this condition and the management approach to it (including a comprehensive discussion of the known risks, side effects and potential benefits of treatment), the patient (family) agrees to implement the following specific plan:  Reassured benign        ANGIOMA (\"CHERRY ANGIOMA\")    Physical Exam:  Anatomic Location: scattered across sun exposed areas of the trunk and extremities   Morphologic Description: Firm red to reddish-blue discrete papules  Pertinent Positives:  Pertinent Negatives:    Additional History of Present Condition:  Present on exam.     Assessment and Plan:  Reassured benign         Scribe Attestation      I,:  Omaira Bhatia MA am acting as a scribe while in the presence of the attending physician.:       I,:  Harvey Haynes MD personally performed the services described in this documentation    as scribed in my presence.:            "

## 2024-04-15 DIAGNOSIS — I10 ESSENTIAL HYPERTENSION: Chronic | ICD-10-CM

## 2024-04-17 RX ORDER — NEBIVOLOL 5 MG/1
TABLET ORAL
Qty: 90 TABLET | Refills: 3 | Status: SHIPPED | OUTPATIENT
Start: 2024-04-17

## 2024-05-17 DIAGNOSIS — K22.2 ESOPHAGEAL STRICTURE: ICD-10-CM

## 2024-05-17 RX ORDER — PANTOPRAZOLE SODIUM 40 MG/1
40 TABLET, DELAYED RELEASE ORAL
Qty: 90 TABLET | Refills: 1 | Status: SHIPPED | OUTPATIENT
Start: 2024-05-17

## 2024-06-13 ENCOUNTER — APPOINTMENT (OUTPATIENT)
Age: 64
End: 2024-06-13
Payer: COMMERCIAL

## 2024-06-13 DIAGNOSIS — I10 ESSENTIAL HYPERTENSION: Chronic | ICD-10-CM

## 2024-06-13 LAB
ALBUMIN SERPL BCP-MCNC: 4.4 G/DL (ref 3.5–5)
ALP SERPL-CCNC: 42 U/L (ref 34–104)
ALT SERPL W P-5'-P-CCNC: 19 U/L (ref 7–52)
ANION GAP SERPL CALCULATED.3IONS-SCNC: 10 MMOL/L (ref 4–13)
AST SERPL W P-5'-P-CCNC: 17 U/L (ref 13–39)
BASOPHILS # BLD AUTO: 0.05 THOUSANDS/ÂΜL (ref 0–0.1)
BASOPHILS NFR BLD AUTO: 1 % (ref 0–1)
BILIRUB SERPL-MCNC: 1.22 MG/DL (ref 0.2–1)
BUN SERPL-MCNC: 17 MG/DL (ref 5–25)
CALCIUM SERPL-MCNC: 9.2 MG/DL (ref 8.4–10.2)
CHLORIDE SERPL-SCNC: 104 MMOL/L (ref 96–108)
CHOLEST SERPL-MCNC: 154 MG/DL
CO2 SERPL-SCNC: 26 MMOL/L (ref 21–32)
CREAT SERPL-MCNC: 0.9 MG/DL (ref 0.6–1.3)
EOSINOPHIL # BLD AUTO: 0.27 THOUSAND/ÂΜL (ref 0–0.61)
EOSINOPHIL NFR BLD AUTO: 4 % (ref 0–6)
ERYTHROCYTE [DISTWIDTH] IN BLOOD BY AUTOMATED COUNT: 13.4 % (ref 11.6–15.1)
GFR SERPL CREATININE-BSD FRML MDRD: 91 ML/MIN/1.73SQ M
GLUCOSE P FAST SERPL-MCNC: 98 MG/DL (ref 65–99)
HCT VFR BLD AUTO: 50.7 % (ref 36.5–49.3)
HDLC SERPL-MCNC: 60 MG/DL
HGB BLD-MCNC: 16.6 G/DL (ref 12–17)
IMM GRANULOCYTES # BLD AUTO: 0.01 THOUSAND/UL (ref 0–0.2)
IMM GRANULOCYTES NFR BLD AUTO: 0 % (ref 0–2)
LDLC SERPL CALC-MCNC: 72 MG/DL (ref 0–100)
LYMPHOCYTES # BLD AUTO: 1.69 THOUSANDS/ÂΜL (ref 0.6–4.47)
LYMPHOCYTES NFR BLD AUTO: 26 % (ref 14–44)
MCH RBC QN AUTO: 30.5 PG (ref 26.8–34.3)
MCHC RBC AUTO-ENTMCNC: 32.7 G/DL (ref 31.4–37.4)
MCV RBC AUTO: 93 FL (ref 82–98)
MONOCYTES # BLD AUTO: 0.56 THOUSAND/ÂΜL (ref 0.17–1.22)
MONOCYTES NFR BLD AUTO: 9 % (ref 4–12)
NEUTROPHILS # BLD AUTO: 3.93 THOUSANDS/ÂΜL (ref 1.85–7.62)
NEUTS SEG NFR BLD AUTO: 60 % (ref 43–75)
NONHDLC SERPL-MCNC: 94 MG/DL
NRBC BLD AUTO-RTO: 0 /100 WBCS
PLATELET # BLD AUTO: 251 THOUSANDS/UL (ref 149–390)
PMV BLD AUTO: 11.3 FL (ref 8.9–12.7)
POTASSIUM SERPL-SCNC: 4.2 MMOL/L (ref 3.5–5.3)
PROT SERPL-MCNC: 6.8 G/DL (ref 6.4–8.4)
RBC # BLD AUTO: 5.45 MILLION/UL (ref 3.88–5.62)
SODIUM SERPL-SCNC: 140 MMOL/L (ref 135–147)
TRIGL SERPL-MCNC: 112 MG/DL
TSH SERPL DL<=0.05 MIU/L-ACNC: 3.77 UIU/ML (ref 0.45–4.5)
WBC # BLD AUTO: 6.51 THOUSAND/UL (ref 4.31–10.16)

## 2024-06-13 PROCEDURE — 85025 COMPLETE CBC W/AUTO DIFF WBC: CPT

## 2024-06-13 PROCEDURE — 84443 ASSAY THYROID STIM HORMONE: CPT

## 2024-06-13 PROCEDURE — 80061 LIPID PANEL: CPT

## 2024-06-13 PROCEDURE — 80053 COMPREHEN METABOLIC PANEL: CPT

## 2024-06-13 PROCEDURE — 36415 COLL VENOUS BLD VENIPUNCTURE: CPT

## 2024-06-17 ENCOUNTER — TELEPHONE (OUTPATIENT)
Age: 64
End: 2024-06-17

## 2024-06-17 ENCOUNTER — OFFICE VISIT (OUTPATIENT)
Age: 64
End: 2024-06-17
Payer: COMMERCIAL

## 2024-06-17 VITALS
DIASTOLIC BLOOD PRESSURE: 74 MMHG | BODY MASS INDEX: 31.47 KG/M2 | TEMPERATURE: 97.1 F | SYSTOLIC BLOOD PRESSURE: 116 MMHG | WEIGHT: 195.8 LBS | HEART RATE: 59 BPM | RESPIRATION RATE: 18 BRPM | HEIGHT: 66 IN | OXYGEN SATURATION: 98 %

## 2024-06-17 DIAGNOSIS — Z12.5 SCREENING FOR MALIGNANT NEOPLASM OF PROSTATE: ICD-10-CM

## 2024-06-17 DIAGNOSIS — E66.9 OBESITY (BMI 30.0-34.9): Chronic | ICD-10-CM

## 2024-06-17 DIAGNOSIS — E78.5 HYPERLIPIDEMIA LDL GOAL <100: Chronic | ICD-10-CM

## 2024-06-17 DIAGNOSIS — I10 ESSENTIAL HYPERTENSION: Primary | Chronic | ICD-10-CM

## 2024-06-17 DIAGNOSIS — N40.1 BENIGN PROSTATIC HYPERPLASIA WITH WEAK URINARY STREAM: ICD-10-CM

## 2024-06-17 DIAGNOSIS — I25.10 CORONARY ARTERIOSCLEROSIS IN NATIVE ARTERY: ICD-10-CM

## 2024-06-17 DIAGNOSIS — K21.9 GASTROESOPHAGEAL REFLUX DISEASE WITHOUT ESOPHAGITIS: Chronic | ICD-10-CM

## 2024-06-17 DIAGNOSIS — R39.12 BENIGN PROSTATIC HYPERPLASIA WITH WEAK URINARY STREAM: ICD-10-CM

## 2024-06-17 PROCEDURE — 99214 OFFICE O/P EST MOD 30 MIN: CPT | Performed by: INTERNAL MEDICINE

## 2024-06-17 NOTE — PROGRESS NOTES
INTERNAL MEDICINE OFFICE VISIT  [facility] [place] [address]  Tel: [phone]      NAME: Ramon Johnson  AGE: 63 y.o.  SEX: male  : 1960   MRN: [id number]    DATE: 2024  TIME: 8:28 AM      Assessment and Plan:  1. Essential hypertension  Blood pressure is stable, continue the same medication  - CBC and differential; Future  - Comprehensive metabolic panel; Future  - Lipid panel; Future  - TSH, 3rd generation; Future    2. Hyperlipidemia LDL goal <100  Continue Crestor, lipid profile is stable    3. Coronary arteriosclerosis in native artery  Follow-up with cardiology    4. Benign prostatic hyperplasia with weak urinary stream  Continue tamsulosin, is no longer following up with urology.    5. Gastroesophageal reflux disease without esophagitis  Continue pantoprazole, has an esophageal stricture for which he is following up with GI    6. Obesity (BMI 30.0-34.9)  Was advised to lose weight    7. Screening for malignant neoplasm of prostate    - PSA, Total Screen; Future      - Counseling Documentation: patient was counseled regarding: diagnostic results, instructions for management, risk factor reductions, prognosis, patient and family education, risks and benefits of treatment options, and importance of compliance with treatment  - Medication Side Effects: Adverse side effects of medications were reviewed with the patient/guardian today.      Return for follow up visit in 6 months or earlier, if needed.      Chief Complaint:  Chief Complaint   Patient presents with    Follow-up     Jaw pain         History of Present Illness:   Blood pressure, cholesterol is stable with medications  Follows up with cardiology for the CAD.  Symptoms of BPH are stable on tamsulosin  GERD is stable and did not need to go back for the esophageal stricture      Active Problem List:  Patient Active Problem List   Diagnosis    Coronary arteriosclerosis in  native artery    Gastroesophageal reflux disease without esophagitis    Hyperlipidemia LDL goal <100    Essential hypertension    Obesity (BMI 30.0-34.9)    Benign prostatic hyperplasia with weak urinary stream    Other dysphagia    PND (post-nasal drip)    Esophageal stricture         Past Medical History:  Past Medical History:   Diagnosis Date    CAD (coronary artery disease) 2009    50% RCA stenosis on coronary angiogram     Coronary artery disease     30% blockage rca    GERD (gastroesophageal reflux disease)     Hyperlipidemia     Hypertension     Liver nodule 02/07/2022    Obesity     Schatzki's ring 02/07/2014         Past Surgical History:  Past Surgical History:   Procedure Laterality Date    CARDIAC CATHETERIZATION  2009, 2018    COLONOSCOPY  10/13/2011    ESOPHAGOGASTRODUODENOSCOPY           Family History:  Family History   Problem Relation Age of Onset    Coronary artery disease Father     Heart attack Father 44    Arrhythmia Father     Heart disease Father     Cancer Maternal Aunt         GI    Cancer Maternal Aunt          Social History:  Social History     Socioeconomic History    Marital status: /Civil Union     Spouse name: None    Number of children: None    Years of education: None    Highest education level: None   Occupational History    None   Tobacco Use    Smoking status: Never    Smokeless tobacco: Never   Vaping Use    Vaping status: Never Used   Substance and Sexual Activity    Alcohol use: Yes     Comment: socially    Drug use: No    Sexual activity: Yes     Partners: Female     Birth control/protection: None   Other Topics Concern    None   Social History Narrative    None     Social Determinants of Health     Financial Resource Strain: Not on file   Food Insecurity: Not on file   Transportation Needs: Not on file   Physical Activity: Inactive (2/25/2021)    Exercise Vital Sign     Days of Exercise per Week: 0 days     Minutes of Exercise per Session: 0 min   Stress: No Stress  Concern Present (9/20/2022)    [employer] - Occupational Stress Questionnaire     Feeling of Stress : Not at all   Social Connections: Not on file   Intimate Partner Violence: Not on file   Housing Stability: Not on file         Allergies:  Allergies   Allergen Reactions    Dilaudid [Hydromorphone Hcl] Other (See Comments)     Pt states he had a bad experience, blood pressure dropped          Medications:    Current Outpatient Medications:     aspirin 81 MG tablet, Take by mouth daily , Disp: , Rfl:     Coenzyme Q10 (CO Q 10) 10 MG CAPS, Take by mouth daily , Disp: , Rfl:     glucosamine-chondroitin 500-400 MG tablet, Take 1 tablet by mouth in the morning, Disp: , Rfl:     loratadine (CLARITIN) 10 mg tablet, Take 10 mg by mouth daily, Disp: , Rfl:     Misc Natural Products (TART CHERRY ADVANCED PO), Take by mouth, Disp: , Rfl:     multivitamin (THERAGRAN) TABS, Take 1 tablet by mouth daily, Disp: , Rfl:     nebivolol (BYSTOLIC) 5 mg tablet, TAKE 1 TABLET DAILY, Disp: 90 tablet, Rfl: 3    pantoprazole (PROTONIX) 40 mg tablet, TAKE 1 TABLET DAILY BEFORE BREAKFAST, Disp: 90 tablet, Rfl: 1    rosuvastatin (CRESTOR) 20 MG tablet, TAKE 1 TABLET DAILY (NEED APPOINTMENT FOR FURTHER REFILLS), Disp: 90 tablet, Rfl: 3    tamsulosin (FLOMAX) 0.4 mg, Take 1 capsule (0.4 mg total) by mouth daily with dinner, Disp: 90 capsule, Rfl: 3    TURMERIC PO, Take by mouth, Disp: , Rfl:     Omega-3 Fatty Acids ([employer] FISH OIL) 1200 MG CAPS, [employer] Fish Oil 1200 MG Oral Capsule; daily, Disp: , Rfl:       The following portions of the patient's history were reviewed and updated as appropriate: past medical history, past surgical history, family history, social history, allergies, current medications and active problem list.      Review of Systems:  Constitutional: Denies fever, chills, weight gain, weight loss, fatigue  Eyes: Denies eye redness, eye discharge, double vision, change in visual acuity  ENT: Denies  hearing loss, tinnitus, sneezing, nasal congestion, nasal discharge, sore throat   Respiratory: Denies cough, expectoration, hemoptysis, shortness of breath, wheezing  Cardiovascular: Denies chest pain, palpitations, lower extremity swelling, orthopnea, PND  Gastrointestinal: Denies abdominal pain, heartburn, nausea, vomiting, hematemesis, diarrhea, bloody stools  Genito-Urinary: Denies dysuria, frequency, difficulty in micturition, nocturia, incontinence  Musculoskeletal: Denies back pain, joint pain, muscle pain  Neurologic: Denies confusion, lightheadedness, syncope, headache, focal weakness, sensory changes, seizures  Endocrine: Denies polyuria, polydipsia, temperature intolerance  Allergy and Immunology: Denies hives, insect bite sensitivity  Hematological and Lymphatic: Denies bleeding problems, swollen glands   Psychological: Denies depression, suicidal ideation, anxiety, panic, mood swings  Dermatological: Denies pruritus, rash, skin lesion changes      Vitals:  Vitals:    06/17/24 0750   BP: 116/74   Pulse: 59   Resp: 18   Temp: (!) 97.1 °F (36.2 °C)   SpO2: 98%       Body mass index is 31.6 kg/m².    Weight (last 2 days)       Date/Time Weight    06/17/24 0750 88.8 (195.8)              Physical Examination:  General: Patient is not in acute distress. Awake, alert, responding to commands. No weight gain or loss  Head: Normocephalic. Atraumatic  Eyes: Conjunctiva and lids with no swelling, erythema or discharge. Both pupils normal sized, round and reactive to light. Sclera nonicteric  ENT: External examination of nose and ear normal. Otoscopic examination shows translucent tympanic membranes with patent canals without erythema. Oropharynx moist with no erythema, edema, exudate or lesions  Neck: Supple. JVP not raised. Trachea midline. No masses. No thyromegaly  Lungs: No signs of increased work of breathing or respiratory distress. Bilateral bronchovascular breath sounds with no crackles or rhonchi  Chest  "wall: No tenderness  Cardiovascular: Normal PMI. No thrills. Regular rate and rhythm. S1 and S2 normal. No murmur, rub or gallop  Gastrointestinal: Abdomen soft, nontender. No guarding or rigidity. Liver and spleen not palpable. Bowel sounds present  Neurologic: Cranial nerves II-XII intact. Cortical functions normal. Motor system - Reflexes 2+ and symmetrical. Sensations normal  Musculoskeletal: Gait normal. No joint tenderness  Integumentary: Skin normal with no rash or lesions  Lymphatic: No palpable lymph nodes in neck, axilla or groin  Extremities: No clubbing, cyanosis, edema or varicosities  Psychological: Judgement and insight normal. Mood and affect normal      Laboratory Results:  CBC with diff:   Lab Results   Component Value Date    WBC 6.51 06/13/2024    WBC 8.4 08/07/2014    RBC 5.45 06/13/2024    RBC 4.97 08/07/2014    HGB 16.6 06/13/2024    HGB 14.5 08/07/2014    HCT 50.7 (H) 06/13/2024    HCT 43.8 08/07/2014    MCV 93 06/13/2024    MCV 88 08/07/2014    MCH 30.5 06/13/2024    MCH 29.3 08/07/2014    RDW 13.4 06/13/2024    RDW 13.2 08/07/2014     06/13/2024     08/07/2014       CMP:  Lab Results   Component Value Date    CREATININE 0.90 06/13/2024    CREATININE 0.8 08/07/2014    BUN 17 06/13/2024    BUN 12 08/07/2014     08/07/2014    K 4.2 06/13/2024    K 3.8 08/07/2014     06/13/2024     08/07/2014    CO2 26 06/13/2024    CO2 24.2 08/07/2014    GLUCOSE 78 08/07/2014    PROT 7.0 08/07/2014    ALKPHOS 42 06/13/2024    ALKPHOS 83 08/07/2014    ALT 19 06/13/2024    ALT 28 08/07/2014    AST 17 06/13/2024    AST 21 08/07/2014       No results found for: \"HGBA1C\", \"MG\", \"PHOS\"    No results found for: \"TROPONINI\", \"CKMB\", \"CKTOTAL\"    Lipid Profile:   Lab Results   Component Value Date    CHOL 138 08/07/2014     Lab Results   Component Value Date    HDL 60 06/13/2024    HDL 58 09/19/2023     Lab Results   Component Value Date    LDLCALC 72 06/13/2024    LDLCALC 72 09/19/2023 "     Lab Results   Component Value Date    TRIG 112 06/13/2024    TRIG 142 09/19/2023       Imaging Results:  EGD  Narrative: Table formatting from the original result was not included.   Formerly [facility]  [address]  [place] [address]  [phone]    DATE OF SERVICE:  12/08/22    PHYSICIAN(S):  Attending:   Moises Torres MD     Fellow:   No Staff Documented     INDICATION:  Dysphagia, unspecified type    POST-OP DIAGNOSIS:  See the impression below.    PREPROCEDURE:  Informed consent was obtained for the procedure, including sedation.    Risks of perforation, hemorrhage, adverse drug reaction and aspiration   were discussed. The patient was placed in the left lateral decubitus   position.    Patient was explained about the risks and benefits of the procedure. Risks   including but not limited to bleeding, infection, and perforation were   explained in detail. Also explained about less than 100% sensitivity with   the exam and other alternatives.    DETAILS OF PROCEDURE:  Patient was taken to the procedure room where a time out was performed to   confirm correct patient and correct procedure. The patient underwent   monitored anesthesia care, which was administered by an anesthesia   professional. The patient's blood pressure, heart rate, level of   consciousness, respirations and oxygen were monitored throughout the   procedure. The scope was advanced to the second part of the duodenum.   Retroflexion was performed in the cardia, fundus and incisura. The   patient's estimated blood loss was minimal (<5 mL). The procedure was not   difficult. The patient tolerated the procedure well. There were no   apparent complications.     ANESTHESIA INFORMATION:  ASA: III  Anesthesia Type: IV Sedation with Anesthesia    MEDICATIONS:  No administrations occurring from 1020 to 1027 on 12/08/22     FINDINGS:  Inflamed intrinsic stricture (traversable) with length of less than 1 cm   in the GE  junction and Z-line (35 cm from the incisors); performed cold   forceps biopsy; dilated with Savary-Candice dilator using guidewire to 54   Fr ending size  The upper third of the esophagus, middle third of the esophagus, lower   third of the esophagus, cardia, fundus of the stomach, body of the   stomach, incisura, antrum, prepyloric region, pylorus, duodenal bulb, 2nd   part of the duodenum and 3rd part of the duodenum appeared normal.    SPECIMENS:  ID Type Source Tests Collected by Time Destination   A :   Esophagus   12/8/2022 10:26 AM    Impression: Mild stricture with inflammation at the GE junction status postbiopsy and   54 Upper sorbian savory dilation over guidewire    RECOMMENDATION:  Protonix 40 mg p.o. every morning half hour AC breakfast  Follow-up biopsy results in 3 weeks          Moises Torres MD        Health Maintenance:  Health Maintenance   Topic Date Due    Zoster Vaccine (1 of 2) Never done    Annual Physical  06/13/2020    RSV Vaccine Age 60+ Years (1 - 1-dose 60+ series) Never done    COVID-19 Vaccine (4 - 2023-24 season) 09/01/2023    Depression Screening  06/17/2025    Colorectal Cancer Screening  10/25/2026    DTaP,Tdap,and Td Vaccines (2 - Td or Tdap) 02/27/2034    Hepatitis C Screening  Completed    Influenza Vaccine  Completed    RSV Vaccine age 0-20 Months  Aged Out    Pneumococcal Vaccine: Pediatrics (0 to 5 Years) and At-Risk Patients (6 to 64 Years)  Aged Out    HIB Vaccine  Aged Out    IPV Vaccine  Aged Out    Hepatitis A Vaccine  Aged Out    Meningococcal ACWY Vaccine  Aged Out    HPV Vaccine  Aged Out    HIV Screening  Discontinued     Immunization History   Administered Date(s) Administered    COVID-19 MODERNA VACC 0.5 ML IM 03/17/2021, 04/15/2021, 11/01/2021    INFLUENZA 12/16/2008, 09/17/2018, 11/01/2021    Influenza, injectable, quadrivalent, preservative free 0.5 mL 09/17/2018, 09/23/2019, 12/14/2023    Influenza, seasonal, injectable 10/01/2017    Pneumococcal  Polysaccharide PPV23 12/16/2008    Tdap 02/27/2024         Arthur Salomon MD  6/17/2024,8:28 AM

## 2024-06-21 ENCOUNTER — OFFICE VISIT (OUTPATIENT)
Age: 64
End: 2024-06-21
Payer: COMMERCIAL

## 2024-06-21 ENCOUNTER — TELEPHONE (OUTPATIENT)
Age: 64
End: 2024-06-21

## 2024-06-21 ENCOUNTER — APPOINTMENT (OUTPATIENT)
Age: 64
End: 2024-06-21
Payer: COMMERCIAL

## 2024-06-21 VITALS
RESPIRATION RATE: 18 BRPM | BODY MASS INDEX: 31.34 KG/M2 | HEIGHT: 66 IN | DIASTOLIC BLOOD PRESSURE: 82 MMHG | SYSTOLIC BLOOD PRESSURE: 120 MMHG | WEIGHT: 195 LBS

## 2024-06-21 DIAGNOSIS — K21.9 GASTROESOPHAGEAL REFLUX DISEASE WITHOUT ESOPHAGITIS: Chronic | ICD-10-CM

## 2024-06-21 DIAGNOSIS — K21.9 GASTROESOPHAGEAL REFLUX DISEASE WITHOUT ESOPHAGITIS: Primary | Chronic | ICD-10-CM

## 2024-06-21 DIAGNOSIS — K22.2 ESOPHAGEAL STRICTURE: ICD-10-CM

## 2024-06-21 LAB — MAGNESIUM SERPL-MCNC: 1.9 MG/DL (ref 1.9–2.7)

## 2024-06-21 PROCEDURE — 99213 OFFICE O/P EST LOW 20 MIN: CPT | Performed by: INTERNAL MEDICINE

## 2024-06-21 PROCEDURE — 83735 ASSAY OF MAGNESIUM: CPT

## 2024-06-21 PROCEDURE — 36415 COLL VENOUS BLD VENIPUNCTURE: CPT

## 2024-06-21 NOTE — PROGRESS NOTES
[facility]      Chief Complaint: History of reflux, stricture    HPI:  Ramon Johnson is a 63 y.o.  male who presents with history of EGD with dilation of an inflamed stricture in 2022.  Patient had severe GERD.  He has been on Protonix with excellent results.  He only has an issue if he overeats late at night.  This is a rare event.  Patient has no melena.  No weight loss.  No chest pain.  No shortness of breath.  No other significant GI issues.  He has not had a recent magnesium level.  We discuss also tapering.  He would like to see if he can get away with taking less drug..      Review of Systems:   Constitutional: No fever or chills, feels well, no tiredness, no recent weight gain or weight loss.   HENT: No complaints of earache, no hearing loss, no nosebleeds, no nasal discharge, no sore throat, no hoarseness.    Eyes: No complaints of eye pain, no red eyes, no discharge from eyes, no itchy eyes.  Cardiovascular: No complaints of slow heart rate, no fast heart rate, no chest pain, no palpitations, no leg claudication, no lower extremity edema.   Respiratory: No complaints of shortness of breath, no wheezing, no cough, no SOB on exertion, no orthopnea.   Gastrointestinal: As noted in HPI  Genitourinary: No complaints of dysuria, no incontinence, no hesitancy, no nocturia.   Musculoskeletal: No complaints of arthralgia, no myalgias, no joint swelling or stiffness, no limb pain or swelling.   Neurological: No complaints of headache, no confusion, no convulsions, no numbness or tingling, no dizziness or fainting, no limb weakness, no difficulty walking.    Skin: No complaints of skin rash or skin lesions, no itching, no skin wound, no dry skin.    Hematological/Lymphatic: No complaints of swollen glands, does not bleed easy.   Allergic/Immunologic: No immunocompromised state.  Endocrine:  No complaints of polyuria, no polydipsia.   Psychiatric/Behavioral: is not suicidal, no sleep  "disturbances, no anxiety or depression, no change in personality, no emotional problems.       Historical Information   Past Medical History:   Diagnosis Date    CAD (coronary artery disease) 2009    50% RCA stenosis on coronary angiogram     Coronary artery disease     30% blockage rca    GERD (gastroesophageal reflux disease)     Hyperlipidemia     Hypertension     Liver nodule 02/07/2022    Obesity     Schatzki's ring 02/07/2014     Past Surgical History:   Procedure Laterality Date    CARDIAC CATHETERIZATION  2009, 2018    COLONOSCOPY  10/13/2011    ESOPHAGOGASTRODUODENOSCOPY       Social History   Social History     Substance and Sexual Activity   Alcohol Use Yes    Comment: socially     Social History     Substance and Sexual Activity   Drug Use No     Social History     Tobacco Use   Smoking Status Never   Smokeless Tobacco Never     Family History   Problem Relation Age of Onset    Coronary artery disease Father     Heart attack Father 44    Arrhythmia Father     Heart disease Father     Cancer Maternal Aunt         GI    Cancer Maternal Aunt          Current Medications: has a current medication list which includes the following prescription(s): aspirin, co q 10, glucosamine-chondroitin, loratadine, misc natural products, multivitamin, nebivolol, [employer] fish oil, pantoprazole, rosuvastatin, tamsulosin, and turmeric.        Vital Signs: /82   Resp 18   Ht 5' 6\" (1.676 m)   Wt 88.5 kg (195 lb)   BMI 31.47 kg/m²       Physical Exam:   Constitutional  General Appearance: No acute distress, well appearing and well nourished  Head  Normocephalic  Eyes  Conjunctivae and lids: No swelling, erythema, or discharge.    Pupils and irises: Equal, round and reactive to light.   Ears, Nose, Mouth, and Throat  External inspection of ears and nose: Normal  Nasal mucosa, septum and turbinates: Normal without edema or erythema/   Oropharynx: Normal with no erythema, edema, exudate or lesions.   Neck  Normal range of " motion. Neck supple.   Cardiovascular  Auscultation of the heart: Normal rate and rhythm, normal S1 and S2 without murmurs.  Examination of the extremities for edema and/or varicosities: Normal  Pulmonary/Chest  Respiratory effort: No increased work of breathing or signs of respiratory distress.   Auscultation of lungs: Clear to auscultation, equal breath sounds bilaterally, no wheezes, rales, no rhonchi.   Abdomen  Abdomen: Non-tender, no masses.   Liver and spleen: No hepatomegaly or splenomegaly.   Musculoskeletal  Gait and station: normal.  Digits and Nails: normal without clubbing or cyanosis.  Inspection/palpation of joints, bones, and muscles: Normal  Neurological  No nystagmus or asterixis.   Skin  Skin and subcutaneous tissue: Normal without rashes or lesions.   Lymphatic  Palpation of the lymph nodes in neck: No lymphadenopathy.   Psychiatric  Orientation to person, place and time: Normal.  Mood and affect: Normal.         Labs:  Lab Results   Component Value Date    ALT 19 06/13/2024    AST 17 06/13/2024    BUN 17 06/13/2024    CALCIUM 9.2 06/13/2024     06/13/2024    CHOL 138 08/07/2014    CO2 26 06/13/2024    CREATININE 0.90 06/13/2024    HDL 60 06/13/2024    HCT 50.7 (H) 06/13/2024    HGB 16.6 06/13/2024     06/13/2024    K 4.2 06/13/2024    PSA 0.73 10/17/2023     08/07/2014    TRIG 112 06/13/2024    WBC 6.51 06/13/2024         X-Rays & Procedures:   No orders to display           ______________________________________________________________________      Assessment & Plan:     Diagnoses and all orders for this visit:    Gastroesophageal reflux disease without esophagitis  -     Magnesium; Future    Esophageal stricture      Patient is doing extremely well.  He is taking the Protonix every day.  His only problem occurs rarely if he overeats late at night.  We will obtain a magnesium level.  He will try to taper the Protonix to every other day.  Further recommendations will depend on  his progress.  He will see me in 1 year.  He will call if the tapering does not work.

## 2024-06-21 NOTE — TELEPHONE ENCOUNTER
Patients GI provider:  Dr. Torres    Number to return call: [phone]    Reason for call: Pt calling to sched 1yr f/u for next yr, advised pt that unfortunately sched does not go that far out. Pt will call next yr to sched.    Scheduled procedure/appointment date if applicable: N/A

## 2024-10-18 DIAGNOSIS — R39.12 BENIGN PROSTATIC HYPERPLASIA WITH WEAK URINARY STREAM: ICD-10-CM

## 2024-10-18 DIAGNOSIS — N40.1 BENIGN PROSTATIC HYPERPLASIA WITH WEAK URINARY STREAM: ICD-10-CM

## 2024-10-18 RX ORDER — TAMSULOSIN HYDROCHLORIDE 0.4 MG/1
CAPSULE ORAL
Qty: 90 CAPSULE | Refills: 1 | Status: SHIPPED | OUTPATIENT
Start: 2024-10-18

## 2024-11-13 DIAGNOSIS — K22.2 ESOPHAGEAL STRICTURE: ICD-10-CM

## 2024-11-13 RX ORDER — PANTOPRAZOLE SODIUM 40 MG/1
40 TABLET, DELAYED RELEASE ORAL
Qty: 90 TABLET | Refills: 1 | Status: SHIPPED | OUTPATIENT
Start: 2024-11-13

## 2025-01-02 ENCOUNTER — RA CDI HCC (OUTPATIENT)
Dept: OTHER | Facility: HOSPITAL | Age: 65
End: 2025-01-02

## 2025-01-06 ENCOUNTER — APPOINTMENT (OUTPATIENT)
Age: 65
End: 2025-01-06
Payer: COMMERCIAL

## 2025-01-06 DIAGNOSIS — Z12.5 SCREENING FOR MALIGNANT NEOPLASM OF PROSTATE: ICD-10-CM

## 2025-01-06 DIAGNOSIS — I10 ESSENTIAL HYPERTENSION: Chronic | ICD-10-CM

## 2025-01-06 LAB
ALBUMIN SERPL BCG-MCNC: 4.2 G/DL (ref 3.5–5)
ALP SERPL-CCNC: 48 U/L (ref 34–104)
ALT SERPL W P-5'-P-CCNC: 23 U/L (ref 7–52)
ANION GAP SERPL CALCULATED.3IONS-SCNC: 8 MMOL/L (ref 4–13)
AST SERPL W P-5'-P-CCNC: 19 U/L (ref 13–39)
BASOPHILS # BLD AUTO: 0.07 THOUSANDS/ΜL (ref 0–0.1)
BASOPHILS NFR BLD AUTO: 1 % (ref 0–1)
BILIRUB SERPL-MCNC: 1.12 MG/DL (ref 0.2–1)
BUN SERPL-MCNC: 12 MG/DL (ref 5–25)
CALCIUM SERPL-MCNC: 9 MG/DL (ref 8.4–10.2)
CHLORIDE SERPL-SCNC: 106 MMOL/L (ref 96–108)
CHOLEST SERPL-MCNC: 155 MG/DL (ref ?–200)
CO2 SERPL-SCNC: 28 MMOL/L (ref 21–32)
CREAT SERPL-MCNC: 0.93 MG/DL (ref 0.6–1.3)
EOSINOPHIL # BLD AUTO: 0.55 THOUSAND/ΜL (ref 0–0.61)
EOSINOPHIL NFR BLD AUTO: 8 % (ref 0–6)
ERYTHROCYTE [DISTWIDTH] IN BLOOD BY AUTOMATED COUNT: 13.5 % (ref 11.6–15.1)
GFR SERPL CREATININE-BSD FRML MDRD: 87 ML/MIN/1.73SQ M
GLUCOSE P FAST SERPL-MCNC: 90 MG/DL (ref 65–99)
HCT VFR BLD AUTO: 47.9 % (ref 36.5–49.3)
HDLC SERPL-MCNC: 59 MG/DL
HGB BLD-MCNC: 15.7 G/DL (ref 12–17)
IMM GRANULOCYTES # BLD AUTO: 0.01 THOUSAND/UL (ref 0–0.2)
IMM GRANULOCYTES NFR BLD AUTO: 0 % (ref 0–2)
LDLC SERPL CALC-MCNC: 70 MG/DL (ref 0–100)
LYMPHOCYTES # BLD AUTO: 1.93 THOUSANDS/ΜL (ref 0.6–4.47)
LYMPHOCYTES NFR BLD AUTO: 27 % (ref 14–44)
MCH RBC QN AUTO: 30.3 PG (ref 26.8–34.3)
MCHC RBC AUTO-ENTMCNC: 32.8 G/DL (ref 31.4–37.4)
MCV RBC AUTO: 92 FL (ref 82–98)
MONOCYTES # BLD AUTO: 0.57 THOUSAND/ΜL (ref 0.17–1.22)
MONOCYTES NFR BLD AUTO: 8 % (ref 4–12)
NEUTROPHILS # BLD AUTO: 4.16 THOUSANDS/ΜL (ref 1.85–7.62)
NEUTS SEG NFR BLD AUTO: 56 % (ref 43–75)
NONHDLC SERPL-MCNC: 96 MG/DL
NRBC BLD AUTO-RTO: 0 /100 WBCS
PLATELET # BLD AUTO: 265 THOUSANDS/UL (ref 149–390)
PMV BLD AUTO: 10.5 FL (ref 8.9–12.7)
POTASSIUM SERPL-SCNC: 4.3 MMOL/L (ref 3.5–5.3)
PROT SERPL-MCNC: 6.7 G/DL (ref 6.4–8.4)
PSA SERPL-MCNC: 1.32 NG/ML (ref 0–4)
RBC # BLD AUTO: 5.19 MILLION/UL (ref 3.88–5.62)
SODIUM SERPL-SCNC: 142 MMOL/L (ref 135–147)
TRIGL SERPL-MCNC: 128 MG/DL (ref ?–150)
TSH SERPL DL<=0.05 MIU/L-ACNC: 4.71 UIU/ML (ref 0.45–4.5)
WBC # BLD AUTO: 7.29 THOUSAND/UL (ref 4.31–10.16)

## 2025-01-06 PROCEDURE — 36415 COLL VENOUS BLD VENIPUNCTURE: CPT

## 2025-01-06 PROCEDURE — 80053 COMPREHEN METABOLIC PANEL: CPT

## 2025-01-06 PROCEDURE — 80061 LIPID PANEL: CPT

## 2025-01-06 PROCEDURE — G0103 PSA SCREENING: HCPCS

## 2025-01-06 PROCEDURE — 85025 COMPLETE CBC W/AUTO DIFF WBC: CPT

## 2025-01-06 PROCEDURE — 84443 ASSAY THYROID STIM HORMONE: CPT

## 2025-01-09 ENCOUNTER — OFFICE VISIT (OUTPATIENT)
Dept: FAMILY MEDICINE CLINIC | Facility: MEDICAL CENTER | Age: 65
End: 2025-01-09
Payer: COMMERCIAL

## 2025-01-09 VITALS
DIASTOLIC BLOOD PRESSURE: 76 MMHG | RESPIRATION RATE: 18 BRPM | HEART RATE: 72 BPM | OXYGEN SATURATION: 96 % | SYSTOLIC BLOOD PRESSURE: 120 MMHG | TEMPERATURE: 97.7 F | HEIGHT: 66 IN | BODY MASS INDEX: 32.33 KG/M2 | WEIGHT: 201.2 LBS

## 2025-01-09 DIAGNOSIS — I10 ESSENTIAL HYPERTENSION: Primary | Chronic | ICD-10-CM

## 2025-01-09 DIAGNOSIS — H90.3 SENSORINEURAL HEARING LOSS (SNHL) OF BOTH EARS: ICD-10-CM

## 2025-01-09 DIAGNOSIS — K22.2 ESOPHAGEAL STRICTURE: ICD-10-CM

## 2025-01-09 DIAGNOSIS — I25.10 CORONARY ARTERIOSCLEROSIS IN NATIVE ARTERY: ICD-10-CM

## 2025-01-09 DIAGNOSIS — E78.5 HYPERLIPIDEMIA LDL GOAL <100: Chronic | ICD-10-CM

## 2025-01-09 DIAGNOSIS — Z00.00 ENCOUNTER FOR WELLNESS EXAMINATION IN ADULT: ICD-10-CM

## 2025-01-09 DIAGNOSIS — E66.811 OBESITY (BMI 30.0-34.9): Chronic | ICD-10-CM

## 2025-01-09 PROBLEM — R13.19 OTHER DYSPHAGIA: Status: RESOLVED | Noted: 2022-09-20 | Resolved: 2025-01-09

## 2025-01-09 PROCEDURE — 99396 PREV VISIT EST AGE 40-64: CPT | Performed by: INTERNAL MEDICINE

## 2025-01-09 PROCEDURE — 99214 OFFICE O/P EST MOD 30 MIN: CPT | Performed by: INTERNAL MEDICINE

## 2025-01-09 NOTE — PROGRESS NOTES
Adult Annual Physical  Name: Ramon Johnson      : 1960      MRN: [id number]  Encounter Provider: Arthur Salomon MD  Encounter Date: 2025   Encounter department: [facility] [place]    Assessment & Plan  Essential hypertension    Orders:    CBC and differential; Future    Comprehensive metabolic panel; Future    Lipid panel; Future    TSH, 3rd generation; Future  Blood pressure is stable, continue the same medication  Hyperlipidemia LDL goal <100  Continue Crestor       Coronary arteriosclerosis in native artery  Follow-up with cardiology       Esophageal stricture  Has not had further symptoms pertaining to difficulty swallowing.  Continue the PPI       Sensorineural hearing loss (SNHL) of both ears    Orders:    Ambulatory Referral to Audiology; Future    Obesity (BMI 30.0-34.9)    Was told to try to lose weight       Encounter for wellness examination in adult         Immunizations and preventive care screenings were discussed with patient today. Appropriate education was printed on patient's after visit summary.        Counseling:  Alcohol/drug use: discussed moderation in alcohol intake, the recommendations for healthy alcohol use, and avoidance of illicit drug use.  Dental Health: discussed importance of regular tooth brushing, flossing, and dental visits.  Injury prevention: discussed safety/seat belts, safety helmets, smoke detectors, carbon monoxide detectors, and smoking near bedding or upholstery.  Sexual health: discussed sexually transmitted diseases, partner selection, use of condoms, avoidance of unintended pregnancy, and contraceptive alternatives.  Exercise: the importance of regular exercise/physical activity was discussed. Recommend exercise 3-5 times per week for at least 30 minutes.          History of Present Illness   Patient is here for follow-up of hypertension, hyperlipidemia, hearing loss and obesity.  Follows up with cardiology for the cardiac symptoms.  Has  been having increasing difficulty in hearing and would like to do a hearing test and was given a referral.      Adult Annual Physical:  Patient presents for annual physical.     Diet and Physical Activity:  - Diet/Nutrition: frequent junk food.  - Exercise: no formal exercise.    Depression Screening:  - PHQ-2 Score: 0    General Health:  - Sleep: 4-6 hours of sleep on average.  - Hearing: decreased hearing bilateral ears.  - Vision: no vision problems and wears glasses.  - Dental: regular dental visits.     Health:    - Urinary symptoms: none.     Advanced Care Planning:  - Has an advanced directive?: no      Review of Systems   Constitutional:  Negative for chills, diaphoresis, fatigue and fever.   HENT:  Positive for hearing loss. Negative for congestion, ear discharge, ear pain, postnasal drip, rhinorrhea, sinus pressure, sinus pain, sneezing, sore throat and voice change.    Eyes:  Negative for pain, discharge, redness and visual disturbance.   Respiratory:  Negative for cough, chest tightness, shortness of breath and wheezing.    Cardiovascular:  Negative for chest pain, palpitations and leg swelling.   Gastrointestinal:  Negative for abdominal distention, abdominal pain, blood in stool, constipation, diarrhea, nausea and vomiting.   Endocrine: Negative for cold intolerance, heat intolerance, polydipsia, polyphagia and polyuria.   Genitourinary:  Negative for dysuria, flank pain, frequency, hematuria and urgency.   Musculoskeletal:  Negative for arthralgias, back pain, gait problem, joint swelling, myalgias, neck pain and neck stiffness.   Skin:  Negative for rash.   Neurological:  Negative for dizziness, tremors, syncope, facial asymmetry, speech difficulty, weakness, light-headedness, numbness and headaches.   Hematological:  Does not bruise/bleed easily.   Psychiatric/Behavioral:  Negative for behavioral problems, confusion and sleep disturbance. The patient is not nervous/anxious.      Medical History  "Reviewed by provider this encounter:  Tobacco  Allergies  Meds  Problems  Med Hx  Surg Hx  Fam Hx     .    Objective   /76 (BP Location: Left arm, Patient Position: Sitting, Cuff Size: Large)   Pulse 72   Temp 97.7 °F (36.5 °C) (Temporal)   Resp 18   Ht 5' 6\" (1.676 m)   Wt 91.3 kg (201 lb 3.2 oz)   SpO2 96%   BMI 32.47 kg/m²     Physical Exam  Constitutional:       General: He is not in acute distress.     Appearance: He is well-developed. He is not diaphoretic.   HENT:      Head: Normocephalic and atraumatic.      Right Ear: External ear normal.      Left Ear: External ear normal.      Nose: Nose normal.   Eyes:      General: No scleral icterus.        Right eye: No discharge.         Left eye: No discharge.      Conjunctiva/sclera: Conjunctivae normal.   Cardiovascular:      Rate and Rhythm: Normal rate and regular rhythm.      Heart sounds: Normal heart sounds. No murmur heard.     No friction rub. No gallop.   Pulmonary:      Effort: Pulmonary effort is normal. No respiratory distress.      Breath sounds: Normal breath sounds. No wheezing or rales.   Abdominal:      General: Bowel sounds are normal. There is no distension.      Palpations: Abdomen is soft.      Tenderness: There is no abdominal tenderness. There is no guarding or rebound.   Musculoskeletal:         General: No tenderness.   Skin:     General: Skin is warm and dry.      Findings: No erythema or rash.   Neurological:      Mental Status: He is alert and oriented to person, place, and time.      Cranial Nerves: No cranial nerve deficit.      Sensory: No sensory deficit.      Motor: No abnormal muscle tone.   Psychiatric:         Behavior: Behavior normal.         "

## 2025-01-09 NOTE — ASSESSMENT & PLAN NOTE
Orders:    CBC and differential; Future    Comprehensive metabolic panel; Future    Lipid panel; Future    TSH, 3rd generation; Future  Blood pressure is stable, continue the same medication

## 2025-02-11 DIAGNOSIS — E78.5 HYPERLIPIDEMIA, UNSPECIFIED HYPERLIPIDEMIA TYPE: ICD-10-CM

## 2025-02-13 RX ORDER — ROSUVASTATIN CALCIUM 20 MG/1
20 TABLET, COATED ORAL DAILY
Qty: 90 TABLET | Refills: 3 | OUTPATIENT
Start: 2025-02-13

## 2025-02-24 ENCOUNTER — OFFICE VISIT (OUTPATIENT)
Dept: CARDIOLOGY CLINIC | Facility: CLINIC | Age: 65
End: 2025-02-24
Payer: COMMERCIAL

## 2025-02-24 VITALS
RESPIRATION RATE: 16 BRPM | WEIGHT: 200 LBS | HEIGHT: 66 IN | DIASTOLIC BLOOD PRESSURE: 76 MMHG | BODY MASS INDEX: 32.14 KG/M2 | SYSTOLIC BLOOD PRESSURE: 130 MMHG

## 2025-02-24 DIAGNOSIS — I10 PRIMARY HYPERTENSION: ICD-10-CM

## 2025-02-24 DIAGNOSIS — I25.10 CORONARY ARTERY DISEASE INVOLVING NATIVE CORONARY ARTERY OF NATIVE HEART WITHOUT ANGINA PECTORIS: ICD-10-CM

## 2025-02-24 DIAGNOSIS — E78.2 MIXED HYPERLIPIDEMIA: ICD-10-CM

## 2025-02-24 DIAGNOSIS — E66.9 OBESITY (BMI 30-39.9): ICD-10-CM

## 2025-02-24 DIAGNOSIS — R07.89 BURNING CHEST PAIN: Primary | ICD-10-CM

## 2025-02-24 DIAGNOSIS — R06.02 SHORTNESS OF BREATH ON EXERTION: ICD-10-CM

## 2025-02-24 PROCEDURE — 99214 OFFICE O/P EST MOD 30 MIN: CPT | Performed by: INTERNAL MEDICINE

## 2025-02-24 RX ORDER — ROSUVASTATIN CALCIUM 20 MG/1
20 TABLET, COATED ORAL DAILY
Qty: 90 TABLET | Refills: 3 | Status: SHIPPED | OUTPATIENT
Start: 2025-02-24

## 2025-02-24 RX ORDER — NEBIVOLOL 5 MG/1
5 TABLET ORAL DAILY
Qty: 90 TABLET | Refills: 3 | Status: SHIPPED | OUTPATIENT
Start: 2025-02-24

## 2025-02-24 NOTE — PROGRESS NOTES
CARDIOLOGY OFFICE VISIT  [facility]  [address] [place], [place] [id number]  Tel: [phone]      NAME: Ramon Johnson  AGE: 64 y.o.  SEX: male  : 1960  MRN: [id number]      Chief Complaint:  Chief Complaint   Patient presents with    Follow-up         History of Present Illness:   Patient of Dr Swartz and Dr Gibson who comes for follow up. States he occasionally has episodes of burning pain in the chest at random times.  Somewhat infrequent and not related to activity per se.  Also has noticed that he gets short of breath with activity such as climbing steps or walking to the mailbox.  Patient denies palpitations, lightheadedness, syncope, swelling feet, orthopnea, PND, claudication.    Nonobstructive CAD -  Taking ASA, statin, BB regularly.    Cardiac catheterization (2018, Dr Buitrago) showed mid LAD 30% tubular stenosis with luminal irregularities in LAD, D1, LCx, OM1. L main and RCA were normal    Primary hypertension -  Has been hypertensive for many years.  Taking medications regularly.  Denies lightheadedness, headache, medication side effects.      Mixed hyperlipidemia -  Has had hyperlipidemia for many years.  Taking statin regularly along with diet control.  Denies myalgia.  PCP closely monitoring the blood work.    Obesity -  Trying to lose weight.      Past Medical History:  Past Medical History:   Diagnosis Date    CAD (coronary artery disease)     50% RCA stenosis on coronary angiogram     Coronary artery disease     30% blockage rca    GERD (gastroesophageal reflux disease)     Hyperlipidemia     Hypertension     Liver nodule 2022    Obesity     Schatzki's ring 2014         Past Surgical History:  Past Surgical History:   Procedure Laterality Date    CARDIAC CATHETERIZATION  2018    COLONOSCOPY  10/13/2011    ESOPHAGOGASTRODUODENOSCOPY           Family History:  Family History   Problem  Relation Age of Onset    Coronary artery disease Father     Heart attack Father 44    Arrhythmia Father     Heart disease Father     Cancer Maternal Aunt         GI    Cancer Maternal Aunt          Social History:  Social History     Socioeconomic History    Marital status: /Civil Union     Spouse name: None    Number of children: None    Years of education: None    Highest education level: None   Occupational History    None   Tobacco Use    Smoking status: Never    Smokeless tobacco: Never   Vaping Use    Vaping status: Never Used   Substance and Sexual Activity    Alcohol use: Yes     Comment: socially    Drug use: No    Sexual activity: Yes     Partners: Female     Birth control/protection: None   Other Topics Concern    None   Social History Narrative    None     Social Drivers of Health     Financial Resource Strain: Not on file   Food Insecurity: Not on file   Transportation Needs: Not on file   Physical Activity: Inactive (2/25/2021)    Exercise Vital Sign     Days of Exercise per Week: 0 days     Minutes of Exercise per Session: 0 min   Stress: No Stress Concern Present (9/20/2022)    [employer] - Occupational Stress Questionnaire     Feeling of Stress : Not at all   Social Connections: Not on file   Intimate Partner Violence: Not on file   Housing Stability: Not on file         Active Problems:  Patient Active Problem List   Diagnosis    Coronary arteriosclerosis in native artery    Gastroesophageal reflux disease without esophagitis    Hyperlipidemia LDL goal <100    Essential hypertension    Obesity (BMI 30.0-34.9)    Benign prostatic hyperplasia with weak urinary stream    PND (post-nasal drip)    Esophageal stricture    Sensorineural hearing loss (SNHL) of both ears         The following portions of the patient's history were reviewed and updated as appropriate: past medical history, past surgical history, past family history,  past social history, current  medications, allergies and problem list.      Review of Systems:  Constitutional: Denies fever, chills  Eyes: Denies eye redness, eye discharge  ENT: Denies hearing loss, sneezing, nasal discharge, sore throat   Respiratory: Denies cough, expectoration. +shortness of breath  Cardiovascular: Denies palpitations, lower extremity swelling. +chest burning  Gastrointestinal: Denies abdominal pain, nausea, vomiting, diarrhea  Genito-Urinary: Denies dysuria, incontinence  Musculoskeletal: Denies back pain, joint pain, muscle pain  Neurologic: Denies lightheadedness, syncope, headache, seizures  Endocrine: Denies polydipsia, temperature intolerance  Allergy and Immunology: Denies hives, insect bite sensitivity  Hematological and Lymphatic: Denies bleeding problems, swollen glands   Psychological: Denies depression, suicidal ideation, anxiety, panic  Dermatological: Denies pruritus, rash, skin lesion changes      Vitals:  Vitals:    02/24/25 0937   BP: 130/76   Resp: 16       Body mass index is 32.28 kg/m².    Weight (last 2 days)       Date/Time Weight    02/24/25 0937 90.7 (200)              Physical Examination:  General: Patient is not in acute distress. Awake, alert, oriented in time, place and person. Responding to commands  Head: Normocephalic. Atraumatic  Eyes: Both pupils normal sized, round and reactive to light. Nonicteric  ENT: Normal external ear canals  Neck: Supple. JVP not raised. Trachea central. No thyromegaly  Lungs: Bilateral bronchovascular breath sounds with no crackles or rhonchi  Chest wall: No tenderness  Cardiovascular: RRR. S1 and S2 normal. No murmur, rub or gallop  Gastrointestinal: Abdomen soft, nontender. No guarding or rigidity. Liver and spleen not palpable. Bowel sounds present  Neurologic: Patient is awake, alert, oriented in time, place and person. Responding to commands. Moving all extremities  Integumentary:  No skin rash  Lymphatic: No cervical lymphadenopathy  Back: Symmetric. No CVA  tenderness  Extremities: No clubbing, cyanosis or edema      Laboratory Results:  CBC with diff:   Lab Results   Component Value Date    WBC 7.29 2025    WBC 8.4 2014    RBC 5.19 2025    RBC 4.97 2014    HGB 15.7 2025    HGB 14.5 2014    HCT 47.9 2025    HCT 43.8 2014    MCV 92 2025    MCV 88 2014    MCH 30.3 2025    MCH 29.3 2014    RDW 13.5 2025    RDW 13.2 2014     2025     2014       CMP:  Lab Results   Component Value Date    CREATININE 0.93 2025    CREATININE 0.8 2014    BUN 12 2025    BUN 12 2014     2014    K 4.3 2025    K 3.8 2014     2025     2014    CO2 28 2025    CO2 24.2 2014    GLUCOSE 78 2014    PROT 7.0 2014    ALKPHOS 48 2025    ALKPHOS 83 2014    ALT 23 2025    ALT 28 2014    AST 19 2025    AST 21 2014       Lab Results   Component Value Date    MG 1.9 2024       Lipid Profile:   Lab Results   Component Value Date    CHOL 138 2014     Lab Results   Component Value Date    HDL 59 2025    HDL 60 2024    HDL 58 2023     Lab Results   Component Value Date    LDLCALC 70 2025    LDLCALC 72 2024    LDLCALC 72 2023     Lab Results   Component Value Date    TRIG 128 2025    TRIG 112 2024    TRIG 142 2023       Cardiac testing:   Results for orders placed during the hospital encounter of 02/15/18    NM myocardial perfusion spect (rx stress and/or rest)    [facility]  [address]  [place] [address] [phone]    Rest/Stress Gated SPECT Myocardial Perfusion Imaging After Regadenoson    Patient: BON BRANDT  MR number: [id number]  Account number: [id number]  : 1960  Age: 57 years  Gender: Male  Status: Outpatient  Location: [facility]  [place]  Height: 66 in  Weight: 208 lb  BP: 122/ 80 mmHg    Allergies: NO KNOWN ALLERGIES    Diagnosis: R07.89 - Other chest pain    Primary Physician:  Bryce Irving DO  Interpreting Physician:  Andres Buitrago MD  Referring Physician:  Andres Buitrago MD  Technician:  Bob Mayo BS, BA, AART(N)  Group:  [facility]  Other:  Maryann Landis MS, CCT    INDICATIONS: Evaluation of known coronary artery disease.    HISTORY: The patient is a 57 year old  male. Chest pain status: recent onset chest pain. Coronary artery disease risk factors: dyslipidemia, hypertension, and family history of premature coronary artery disease. Cardiovascular  history: coronary artery disease. Prior cardiovascular procedures: cardiac angiogram. Co-morbidity: obesity. Medications: a beta blocker, aspirin, and a lipid lowering agent.    REST ECG: ST segments and T waves were normal.    PROCEDURE: The study was performed at [facility]. A regadenoson infusion pharmacologic stress test was performed. Gated SPECT myocardial perfusion imaging was performed after stress and at rest. Systolic blood pressure  was 122 mmHg, at the start of the study. Diastolic blood pressure was 80 mmHg, at the start of the study. The heart rate was 67 bpm, at the start of the study.  Regadenoson protocol:  HR bpm SBP mmHg DBP mmHg Symptoms  Baseline 92 138 76 --  Immediate 109 130 70 --  1 min 100 -- -- nausea  2 min 93 126 80 --  Patient started with Travon Protocol but was unable to achieve THR and was developing light substernal burning. Test was changed to Lexiscan and completed. IV aminophylline was administered.    STRESS SUMMARY: Patient had no chest burning during Lexiscan procedure. Duration of pharmacologic stress was 3 min. Maximal heart rate during stress was 109 bpm. The rate-pressure product for the peak heart rate and blood pressure was  [id number]. There was no chest pain during stress. The  stress test was terminated due to protocol completion. The stress ECG was negative for ischemia and normal. There were no stress arrhythmias or conduction abnormalities.    ISOTOPE ADMINISTRATION:  Resting isotope administration Stress isotope administration  Agent Tetrofosmin Tetrofosmin  Dose 10.62 mCi 31.4 mCi  Date 02/15/2018 02/15/2018  Injection-image interval 30 min 45 min    The radiopharmaceutical was injected at the peak effect of pharmacologic stress.    PERFUSION DEFECTS:  -  There was a small, mildly severe, fixed myocardial perfusion defect of the apical wall likely due to low counts.    GATED SPECT:  The calculated left ventricular ejection fraction was 57 %. Left ventricular ejection fraction was within normal limits by visual estimate. There was no left ventricular regional abnormality.    SUMMARY:  -  Stress results: There was no chest pain during stress.  -  ECG conclusions: The stress ECG was negative for ischemia and normal.  -  Perfusion imaging: There was a small, mildly severe, fixed myocardial perfusion defect of the apical wall likely due to low counts.  -  Gated SPECT: The calculated left ventricular ejection fraction was 57 %. Left ventricular ejection fraction was within normal limits by visual estimate. There was no left ventricular regional abnormality.    IMPRESSIONS: Normal study. There was image artifact, without diagnostic evidence for perfusion abnormality. Left ventricular systolic function was normal.    Prepared and signed by    Andres Buitrago MD  Signed 02/15/2018 17:57:29      Medications:    Current Outpatient Medications:     aspirin 81 MG tablet, Take by mouth daily , Disp: , Rfl:     Coenzyme Q10 (CO Q 10) 10 MG CAPS, Take by mouth daily , Disp: , Rfl:     glucosamine-chondroitin 500-400 MG tablet, Take 1 tablet by mouth in the morning, Disp: , Rfl:     loratadine (CLARITIN) 10 mg tablet, Take 10 mg by mouth daily, Disp: , Rfl:     Misc Natural Products (TART CHERRY  ADVANCED PO), Take by mouth, Disp: , Rfl:     multivitamin (THERAGRAN) TABS, Take 1 tablet by mouth daily, Disp: , Rfl:     nebivolol (BYSTOLIC) 5 mg tablet, Take 1 tablet (5 mg total) by mouth daily, Disp: 90 tablet, Rfl: 3    Omega-3 Fatty Acids ([employer] FISH OIL) 1200 MG CAPS, [employer]S Fish Oil 1200 MG Oral Capsule; daily, Disp: , Rfl:     pantoprazole (PROTONIX) 40 mg tablet, TAKE 1 TABLET DAILY BEFORE BREAKFAST, Disp: 90 tablet, Rfl: 1    rosuvastatin (CRESTOR) 20 MG tablet, Take 1 tablet (20 mg total) by mouth daily Need appointment for further refills, Disp: 90 tablet, Rfl: 3    tamsulosin (FLOMAX) 0.4 mg, TAKE 1 CAPSULE DAILY WITH DINNER, Disp: 90 capsule, Rfl: 1    TURMERIC PO, Take by mouth, Disp: , Rfl:       Allergies:  Allergies   Allergen Reactions    Dilaudid [Hydromorphone Hcl] Other (See Comments)     Pt states he had a bad experience, blood pressure dropped          Assessment and Plan:  1. Burning chest pain (Primary)  2. Shortness of breath on exertion  3. Coronary artery disease involving native coronary artery of native heart without angina pectoris  Patient has a family history of sudden cardiac death from CAD.  In view of his concerning symptoms and no recent ischemic workup, 2D echocardiogram and an exercise nuclear stress test have been ordered for evaluation,    4. Primary hypertension  Blood pressure stable.  Continue nebivolol 5 mg daily.  Continue to monitor BP and pulse rate at home and call if abnormal  - nebivolol (BYSTOLIC) 5 mg tablet; Take 1 tablet (5 mg total) by mouth daily  Dispense: 90 tablet; Refill: 3    5. Mixed hyperlipidemia  New Crestor 20 mg daily and diet controlled.  - rosuvastatin (CRESTOR) 20 MG tablet; Take 1 tablet (20 mg total) by mouth daily Need appointment for further refills  Dispense: 90 tablet; Refill: 3    6. Obesity  Try to lose weight    Recommend aggressive risk factor modification and therapeutic lifestyle changes.  Low-salt, low-calorie, low-fat,  low-cholesterol diet with regular exercise and to optimize weight.  I will defer the ordering and monitoring of necessity lab studies to you, but I am available and happy to review and manage any of the data at your request in the future.    Discussed concepts of atherosclerosis, including signs and symptoms of cardiac disease.    Previous studies were reviewed.    Safety measures were reviewed.  Questions were entertained and answered.  Patient was advised to report any problems requiring medical attention.    Follow-up with PCP and appropriate specialist and lab work as discussed.    Return for follow up visit as scheduled or earlier, if needed.  Thank you for allowing me to participate in the care and evaluation of your patient.  Should you have any questions, please feel free to contact me.    Oniel Salomon MD  2/24/2025,10:48 AM

## 2025-03-11 ENCOUNTER — OFFICE VISIT (OUTPATIENT)
Dept: AUDIOLOGY | Age: 65
End: 2025-03-11
Payer: COMMERCIAL

## 2025-03-11 DIAGNOSIS — H90.3 SENSORINEURAL HEARING LOSS (SNHL) OF BOTH EARS: ICD-10-CM

## 2025-03-11 PROCEDURE — 92567 TYMPANOMETRY: CPT | Performed by: AUDIOLOGIST

## 2025-03-11 PROCEDURE — 92557 COMPREHENSIVE HEARING TEST: CPT | Performed by: AUDIOLOGIST

## 2025-03-11 NOTE — PROGRESS NOTES
"Diagnostic Hearing Evaluation    Name:  Ramon Johnson  :  1960  Age:  64 y.o.   MRN:  [id number]  Date of Evaluation: 25     HISTORY:     Reason for visit:  Gradually decreased hearing    Ramon Johnson is being seen today at the request of Dr. Salomon for an initial  evaluation of hearing. The patient reports  he says \"what\" often and his TV is loud . The patient  denies otalgia, otorrhea, dizziness, and tinnitus. He has a history of noise exposure related to his  service.    EVALUATION:    Otoscopic Evaluation:   Right Ear: Unremarkable, canal clear   Left Ear: Unremarkable, canal clear    Tympanometry:   Right Ear: Type A; normal middle ear pressure and static compliance    Left Ear: Type A; normal middle ear pressure and static compliance     Speech Audiometry:  Speech Reception (SRT)    Right Ear: 20 dB HL    Left Ear: 20 dB HL    Word Recognition Scores (WRS):  Right Ear: excellent (100 % correct)     Left Ear: excellent (100 % correct)    Stimuli: [id number]    Pure Tone Audiometry:  Conventional pure tone audiometry from 250 - 8000 Hz  was obtained with good reliability and revealed the following:     Right Ear: Normal to mild sensorineural hearing loss at 1 KHz then improving to normal hearing sensitivity at 1.5 KHz through 8 KHz   Left Ear: Normal to mild sensorineural hearing loss at 750 Hz and 1 KHz then improving to normal génesis sensitivity at 1.5 KHz though 8 KHz    *see attached audiogram    IMPRESSIONS:   Asymmetrical sensorineural hearing loss.    RECOMMENDATIONS:  Annual hearing eval, Consult ENT, Return to [facility]. for F/U, Hearing Aid Evaluation, and Copy to Patient/Caregiver    PATIENT EDUCATION:   The results of today's results and recommendations were reviewed with the patient and his hearing thresholds were explained at length. Treatment options, including amplification and communication strategies, were discussed as appropriate. The patient voiced understanding of his test " results. Questions were addressed and the patient was encouraged to contact our department should concerns arise.      Dian Yadav., CCC-A  Clinical Audiologist  [facility]  [address] [place] [id number]

## 2025-03-12 ENCOUNTER — HOSPITAL ENCOUNTER (OUTPATIENT)
Dept: NON INVASIVE DIAGNOSTICS | Facility: CLINIC | Age: 65
Discharge: HOME | End: 2025-03-12
Payer: COMMERCIAL

## 2025-03-12 VITALS
HEIGHT: 66 IN | SYSTOLIC BLOOD PRESSURE: 148 MMHG | BODY MASS INDEX: 31.98 KG/M2 | WEIGHT: 199 LBS | HEART RATE: 63 BPM | DIASTOLIC BLOOD PRESSURE: 98 MMHG | OXYGEN SATURATION: 96 %

## 2025-03-12 VITALS
HEART RATE: 67 BPM | SYSTOLIC BLOOD PRESSURE: 130 MMHG | DIASTOLIC BLOOD PRESSURE: 76 MMHG | BODY MASS INDEX: 32.14 KG/M2 | WEIGHT: 199.96 LBS | HEIGHT: 66 IN

## 2025-03-12 DIAGNOSIS — R06.02 SHORTNESS OF BREATH ON EXERTION: ICD-10-CM

## 2025-03-12 DIAGNOSIS — I25.10 CORONARY ARTERY DISEASE INVOLVING NATIVE CORONARY ARTERY OF NATIVE HEART WITHOUT ANGINA PECTORIS: ICD-10-CM

## 2025-03-12 DIAGNOSIS — R07.89 BURNING CHEST PAIN: ICD-10-CM

## 2025-03-12 LAB
AORTIC ROOT: 3.2 CM
ASCENDING AORTA: 3.2 CM
CHEST PAIN STATEMENT: NORMAL
E WAVE DECELERATION TIME: 198 MS
E/A RATIO: 1.13
FRACTIONAL SHORTENING: 26 (ref 28–44)
INTERVENTRICULAR SEPTUM IN DIASTOLE (PARASTERNAL SHORT AXIS VIEW): 1.3 CM
INTERVENTRICULAR SEPTUM: 1.3 CM (ref 0.6–1.1)
LAAS-AP2: 19.3 CM2
LAAS-AP4: 19.6 CM2
LEFT ATRIUM SIZE: 4.2 CM
LEFT ATRIUM VOLUME (MOD BIPLANE): 54 ML
LEFT ATRIUM VOLUME INDEX (MOD BIPLANE): 27 ML/M2
LEFT INTERNAL DIMENSION IN SYSTOLE: 2.9 CM (ref 2.1–4)
LEFT VENTRICULAR INTERNAL DIMENSION IN DIASTOLE: 3.9 CM (ref 3.5–6)
LEFT VENTRICULAR POSTERIOR WALL IN END DIASTOLE: 1.2 CM
LEFT VENTRICULAR STROKE VOLUME: 34 ML
LV EF US.2D.A4C+ESTIMATED: 58 %
LVSV (TEICH): 34 ML
MAX DIASTOLIC BP: 96 MMHG
MAX HR PERCENT: 93 %
MAX HR: 146 BPM
MAX PREDICTED HEART RATE: 157 BPM
MV E'TISSUE VEL-LAT: 9 CM/S
MV E'TISSUE VEL-SEP: 7 CM/S
MV PEAK A VEL: 0.52 M/S
MV PEAK E VEL: 59 CM/S
MV STENOSIS PRESSURE HALF TIME: 57 MS
MV VALVE AREA P 1/2 METHOD: 3.86
PROTOCOL NAME: NORMAL
RA PRESSURE ESTIMATED: 3 MMHG
RATE PRESSURE PRODUCT: NORMAL
RIGHT ATRIAL 2D VOLUME: 44 ML
RIGHT ATRIUM AREA SYSTOLE A4C: 17 CM2
RIGHT VENTRICLE ID DIMENSION: 4 CM
RV PSP: 26 MMHG
SL CV LEFT ATRIUM LENGTH A2C: 5.6 CM
SL CV PED ECHO LEFT VENTRICLE DIASTOLIC VOLUME (MOD BIPLANE) 2D: 65 ML
SL CV PED ECHO LEFT VENTRICLE SYSTOLIC VOLUME (MOD BIPLANE) 2D: 32 ML
SL CV REST NUCLEAR ISOTOPE DOSE: 10.73 MCI
SL CV STRESS NUCLEAR ISOTOPE DOSE: 32.58 MCI
SL CV STRESS RECOVERY BP: NORMAL MMHG
SL CV STRESS RECOVERY HR: 94 BPM
SL CV STRESS RECOVERY O2 SAT: 98 %
SL CV STRESS STAGE REACHED: 4
SPECT HRT GATED+EF W RNC IV: 65 %
STRESS ANGINA INDEX: 0
STRESS BASELINE BP: NORMAL MMHG
STRESS BASELINE HR: 63 BPM
STRESS O2 SAT REST: 96 %
STRESS PEAK HR: 146 BPM
STRESS POST ESTIMATED WORKLOAD: 13.4 METS
STRESS POST EXERCISE DUR MIN: 10 MIN
STRESS POST EXERCISE DUR MIN: 10 MIN
STRESS POST EXERCISE DUR SEC: 2 SEC
STRESS POST EXERCISE DUR SEC: 2 SEC
STRESS POST O2 SAT PEAK: 98 %
STRESS POST PEAK BP: 214 MMHG
STRESS POST PEAK HR: 146 BPM
STRESS POST PEAK SYSTOLIC BP: 214 MMHG
STRESS/REST PERFUSION RATIO: 0.94
TARGET HR FORMULA: NORMAL
TR MAX PG: 23 MMHG
TR PEAK VELOCITY: 2.4 M/S
TRICUSPID ANNULAR PLANE SYSTOLIC EXCURSION: 2.4 CM
TRICUSPID VALVE PEAK REGURGITATION VELOCITY: 2.4 M/S

## 2025-03-12 PROCEDURE — 78452 HT MUSCLE IMAGE SPECT MULT: CPT

## 2025-03-12 PROCEDURE — 93016 CV STRESS TEST SUPVJ ONLY: CPT | Performed by: INTERNAL MEDICINE

## 2025-03-12 PROCEDURE — 93018 CV STRESS TEST I&R ONLY: CPT | Performed by: INTERNAL MEDICINE

## 2025-03-12 PROCEDURE — 93306 TTE W/DOPPLER COMPLETE: CPT

## 2025-03-12 PROCEDURE — 93017 CV STRESS TEST TRACING ONLY: CPT

## 2025-03-12 PROCEDURE — 78452 HT MUSCLE IMAGE SPECT MULT: CPT | Performed by: INTERNAL MEDICINE

## 2025-03-12 PROCEDURE — 93306 TTE W/DOPPLER COMPLETE: CPT | Performed by: INTERNAL MEDICINE

## 2025-03-12 PROCEDURE — A9502 TC99M TETROFOSMIN: HCPCS

## 2025-04-16 DIAGNOSIS — R39.12 BENIGN PROSTATIC HYPERPLASIA WITH WEAK URINARY STREAM: ICD-10-CM

## 2025-04-16 DIAGNOSIS — N40.1 BENIGN PROSTATIC HYPERPLASIA WITH WEAK URINARY STREAM: ICD-10-CM

## 2025-04-16 RX ORDER — TAMSULOSIN HYDROCHLORIDE 0.4 MG/1
CAPSULE ORAL
Qty: 90 CAPSULE | Refills: 3 | Status: SHIPPED | OUTPATIENT
Start: 2025-04-16

## 2025-05-12 DIAGNOSIS — K22.2 ESOPHAGEAL STRICTURE: ICD-10-CM

## 2025-05-12 RX ORDER — PANTOPRAZOLE SODIUM 40 MG/1
40 TABLET, DELAYED RELEASE ORAL
Qty: 90 TABLET | Refills: 0 | Status: SHIPPED | OUTPATIENT
Start: 2025-05-12

## 2025-07-16 ENCOUNTER — APPOINTMENT (OUTPATIENT)
Age: 65
End: 2025-07-16
Payer: COMMERCIAL

## 2025-07-16 DIAGNOSIS — I10 ESSENTIAL HYPERTENSION: Chronic | ICD-10-CM

## 2025-07-16 LAB
ALBUMIN SERPL BCG-MCNC: 4.2 G/DL (ref 3.5–5)
ALP SERPL-CCNC: 48 U/L (ref 34–104)
ALT SERPL W P-5'-P-CCNC: 21 U/L (ref 7–52)
ANION GAP SERPL CALCULATED.3IONS-SCNC: 9 MMOL/L (ref 4–13)
AST SERPL W P-5'-P-CCNC: 22 U/L (ref 13–39)
BASOPHILS # BLD AUTO: 0.06 THOUSANDS/ÂΜL (ref 0–0.1)
BASOPHILS NFR BLD AUTO: 1 % (ref 0–1)
BILIRUB SERPL-MCNC: 1.09 MG/DL (ref 0.2–1)
BUN SERPL-MCNC: 16 MG/DL (ref 5–25)
CALCIUM SERPL-MCNC: 8.8 MG/DL (ref 8.4–10.2)
CHLORIDE SERPL-SCNC: 106 MMOL/L (ref 96–108)
CHOLEST SERPL-MCNC: 146 MG/DL (ref ?–200)
CO2 SERPL-SCNC: 25 MMOL/L (ref 21–32)
CREAT SERPL-MCNC: 0.78 MG/DL (ref 0.6–1.3)
EOSINOPHIL # BLD AUTO: 0.33 THOUSAND/ÂΜL (ref 0–0.61)
EOSINOPHIL NFR BLD AUTO: 5 % (ref 0–6)
ERYTHROCYTE [DISTWIDTH] IN BLOOD BY AUTOMATED COUNT: 13.4 % (ref 11.6–15.1)
GFR SERPL CREATININE-BSD FRML MDRD: 96 ML/MIN/1.73SQ M
GLUCOSE P FAST SERPL-MCNC: 87 MG/DL (ref 65–99)
HCT VFR BLD AUTO: 47 % (ref 36.5–49.3)
HDLC SERPL-MCNC: 62 MG/DL
HGB BLD-MCNC: 15.9 G/DL (ref 12–17)
IMM GRANULOCYTES # BLD AUTO: 0.02 THOUSAND/UL (ref 0–0.2)
IMM GRANULOCYTES NFR BLD AUTO: 0 % (ref 0–2)
LDLC SERPL CALC-MCNC: 63 MG/DL (ref 0–100)
LYMPHOCYTES # BLD AUTO: 1.99 THOUSANDS/ÂΜL (ref 0.6–4.47)
LYMPHOCYTES NFR BLD AUTO: 28 % (ref 14–44)
MCH RBC QN AUTO: 30.1 PG (ref 26.8–34.3)
MCHC RBC AUTO-ENTMCNC: 33.8 G/DL (ref 31.4–37.4)
MCV RBC AUTO: 89 FL (ref 82–98)
MONOCYTES # BLD AUTO: 0.57 THOUSAND/ÂΜL (ref 0.17–1.22)
MONOCYTES NFR BLD AUTO: 8 % (ref 4–12)
NEUTROPHILS # BLD AUTO: 4.17 THOUSANDS/ÂΜL (ref 1.85–7.62)
NEUTS SEG NFR BLD AUTO: 58 % (ref 43–75)
NONHDLC SERPL-MCNC: 84 MG/DL
NRBC BLD AUTO-RTO: 0 /100 WBCS
PLATELET # BLD AUTO: 245 THOUSANDS/UL (ref 149–390)
PMV BLD AUTO: 10.6 FL (ref 8.9–12.7)
POTASSIUM SERPL-SCNC: 4.1 MMOL/L (ref 3.5–5.3)
PROT SERPL-MCNC: 6.7 G/DL (ref 6.4–8.4)
RBC # BLD AUTO: 5.29 MILLION/UL (ref 3.88–5.62)
SODIUM SERPL-SCNC: 140 MMOL/L (ref 135–147)
TRIGL SERPL-MCNC: 106 MG/DL (ref ?–150)
TSH SERPL DL<=0.05 MIU/L-ACNC: 4.59 UIU/ML (ref 0.45–4.5)
WBC # BLD AUTO: 7.14 THOUSAND/UL (ref 4.31–10.16)

## 2025-07-16 PROCEDURE — 80053 COMPREHEN METABOLIC PANEL: CPT

## 2025-07-16 PROCEDURE — 80061 LIPID PANEL: CPT

## 2025-07-16 PROCEDURE — 85025 COMPLETE CBC W/AUTO DIFF WBC: CPT

## 2025-07-16 PROCEDURE — 84443 ASSAY THYROID STIM HORMONE: CPT

## 2025-07-16 PROCEDURE — 36415 COLL VENOUS BLD VENIPUNCTURE: CPT

## 2025-08-08 DIAGNOSIS — K22.2 ESOPHAGEAL STRICTURE: ICD-10-CM

## 2025-08-08 RX ORDER — PANTOPRAZOLE SODIUM 40 MG/1
40 TABLET, DELAYED RELEASE ORAL
Qty: 90 TABLET | Refills: 3 | Status: SHIPPED | OUTPATIENT
Start: 2025-08-08

## 2025-08-14 ENCOUNTER — OFFICE VISIT (OUTPATIENT)
Dept: CARDIOLOGY CLINIC | Facility: CLINIC | Age: 65
End: 2025-08-14
Payer: COMMERCIAL

## 2025-08-19 ENCOUNTER — OFFICE VISIT (OUTPATIENT)
Dept: FAMILY MEDICINE CLINIC | Facility: MEDICAL CENTER | Age: 65
End: 2025-08-19
Payer: COMMERCIAL

## 2025-08-19 VITALS
SYSTOLIC BLOOD PRESSURE: 120 MMHG | OXYGEN SATURATION: 97 % | WEIGHT: 195.4 LBS | BODY MASS INDEX: 31.4 KG/M2 | RESPIRATION RATE: 18 BRPM | HEART RATE: 61 BPM | TEMPERATURE: 97.5 F | DIASTOLIC BLOOD PRESSURE: 70 MMHG | HEIGHT: 66 IN

## 2025-08-19 DIAGNOSIS — N40.1 BENIGN PROSTATIC HYPERPLASIA WITH WEAK URINARY STREAM: ICD-10-CM

## 2025-08-19 DIAGNOSIS — E66.811 OBESITY (BMI 30.0-34.9): Chronic | ICD-10-CM

## 2025-08-19 DIAGNOSIS — I10 ESSENTIAL HYPERTENSION: Primary | Chronic | ICD-10-CM

## 2025-08-19 DIAGNOSIS — R39.12 BENIGN PROSTATIC HYPERPLASIA WITH WEAK URINARY STREAM: ICD-10-CM

## 2025-08-19 DIAGNOSIS — H90.3 SENSORINEURAL HEARING LOSS (SNHL) OF BOTH EARS: ICD-10-CM

## 2025-08-19 DIAGNOSIS — K21.9 GASTROESOPHAGEAL REFLUX DISEASE WITHOUT ESOPHAGITIS: Chronic | ICD-10-CM

## 2025-08-19 DIAGNOSIS — E78.5 HYPERLIPIDEMIA LDL GOAL <100: Chronic | ICD-10-CM

## 2025-08-19 PROCEDURE — 99214 OFFICE O/P EST MOD 30 MIN: CPT | Performed by: INTERNAL MEDICINE
